# Patient Record
Sex: FEMALE | Race: WHITE
[De-identification: names, ages, dates, MRNs, and addresses within clinical notes are randomized per-mention and may not be internally consistent; named-entity substitution may affect disease eponyms.]

---

## 2023-03-13 ENCOUNTER — HOSPITAL ENCOUNTER (INPATIENT)
Dept: HOSPITAL 75 - IRF | Age: 58
LOS: 21 days | Discharge: HOME HEALTH | DRG: 56 | End: 2023-04-03
Attending: INTERNAL MEDICINE | Admitting: INTERNAL MEDICINE
Payer: COMMERCIAL

## 2023-03-13 VITALS — SYSTOLIC BLOOD PRESSURE: 108 MMHG | DIASTOLIC BLOOD PRESSURE: 53 MMHG

## 2023-03-13 VITALS — BODY MASS INDEX: 39.45 KG/M2 | WEIGHT: 251.33 LBS | HEIGHT: 67.01 IN

## 2023-03-13 VITALS — SYSTOLIC BLOOD PRESSURE: 125 MMHG | DIASTOLIC BLOOD PRESSURE: 68 MMHG

## 2023-03-13 DIAGNOSIS — M25.461: ICD-10-CM

## 2023-03-13 DIAGNOSIS — Z93.1: ICD-10-CM

## 2023-03-13 DIAGNOSIS — I69.391: ICD-10-CM

## 2023-03-13 DIAGNOSIS — E43: ICD-10-CM

## 2023-03-13 DIAGNOSIS — H53.2: ICD-10-CM

## 2023-03-13 DIAGNOSIS — Z99.3: ICD-10-CM

## 2023-03-13 DIAGNOSIS — R33.9: ICD-10-CM

## 2023-03-13 DIAGNOSIS — Z79.4: ICD-10-CM

## 2023-03-13 DIAGNOSIS — D64.9: ICD-10-CM

## 2023-03-13 DIAGNOSIS — K59.00: ICD-10-CM

## 2023-03-13 DIAGNOSIS — E78.5: ICD-10-CM

## 2023-03-13 DIAGNOSIS — F41.9: ICD-10-CM

## 2023-03-13 DIAGNOSIS — F32.A: ICD-10-CM

## 2023-03-13 DIAGNOSIS — R13.10: ICD-10-CM

## 2023-03-13 DIAGNOSIS — R53.81: ICD-10-CM

## 2023-03-13 DIAGNOSIS — I69.322: ICD-10-CM

## 2023-03-13 DIAGNOSIS — E11.40: ICD-10-CM

## 2023-03-13 DIAGNOSIS — I10: ICD-10-CM

## 2023-03-13 DIAGNOSIS — L89.154: ICD-10-CM

## 2023-03-13 DIAGNOSIS — I69.351: Primary | ICD-10-CM

## 2023-03-13 DIAGNOSIS — I69.320: ICD-10-CM

## 2023-03-13 DIAGNOSIS — K21.9: ICD-10-CM

## 2023-03-13 DIAGNOSIS — E03.9: ICD-10-CM

## 2023-03-13 DIAGNOSIS — I69.398: ICD-10-CM

## 2023-03-13 PROCEDURE — 87186 SC STD MICRODIL/AGAR DIL: CPT

## 2023-03-13 PROCEDURE — 83540 ASSAY OF IRON: CPT

## 2023-03-13 PROCEDURE — 74230 X-RAY XM SWLNG FUNCJ C+: CPT

## 2023-03-13 PROCEDURE — 81000 URINALYSIS NONAUTO W/SCOPE: CPT

## 2023-03-13 PROCEDURE — 85652 RBC SED RATE AUTOMATED: CPT

## 2023-03-13 PROCEDURE — 82947 ASSAY GLUCOSE BLOOD QUANT: CPT

## 2023-03-13 PROCEDURE — 80202 ASSAY OF VANCOMYCIN: CPT

## 2023-03-13 PROCEDURE — 36569 INSJ PICC 5 YR+ W/O IMAGING: CPT

## 2023-03-13 PROCEDURE — 80053 COMPREHEN METABOLIC PANEL: CPT

## 2023-03-13 PROCEDURE — 72197 MRI PELVIS W/O & W/DYE: CPT

## 2023-03-13 PROCEDURE — 87205 SMEAR GRAM STAIN: CPT

## 2023-03-13 PROCEDURE — 82607 VITAMIN B-12: CPT

## 2023-03-13 PROCEDURE — 85025 COMPLETE CBC W/AUTO DIFF WBC: CPT

## 2023-03-13 PROCEDURE — 36415 COLL VENOUS BLD VENIPUNCTURE: CPT

## 2023-03-13 PROCEDURE — 76937 US GUIDE VASCULAR ACCESS: CPT

## 2023-03-13 PROCEDURE — 86141 C-REACTIVE PROTEIN HS: CPT

## 2023-03-13 PROCEDURE — 87070 CULTURE OTHR SPECIMN AEROBIC: CPT

## 2023-03-13 PROCEDURE — 87077 CULTURE AEROBIC IDENTIFY: CPT

## 2023-03-13 RX ADMIN — INSULIN ASPART SCH UNIT: 100 INJECTION, SOLUTION INTRAVENOUS; SUBCUTANEOUS at 20:14

## 2023-03-13 RX ADMIN — ACETAMINOPHEN PRN MG: 325 TABLET ORAL at 18:12

## 2023-03-13 RX ADMIN — DOCUSATE SODIUM AND SENNOSIDES SCH EA: 8.6; 5 TABLET, FILM COATED ORAL at 22:02

## 2023-03-13 RX ADMIN — MELATONIN 3 MG ORAL TABLET SCH MG: 3 TABLET ORAL at 22:00

## 2023-03-13 RX ADMIN — DOCUSATE SODIUM SCH MG: 100 CAPSULE ORAL at 22:00

## 2023-03-13 RX ADMIN — Medication SCH EACH: at 22:01

## 2023-03-13 RX ADMIN — SUCRALFATE SCH GM: 1 TABLET ORAL at 16:08

## 2023-03-13 RX ADMIN — INSULIN ASPART SCH UNIT: 100 INJECTION, SOLUTION INTRAVENOUS; SUBCUTANEOUS at 16:58

## 2023-03-13 RX ADMIN — FAMOTIDINE SCH MG: 20 TABLET, FILM COATED ORAL at 22:02

## 2023-03-13 RX ADMIN — AMITRIPTYLINE HYDROCHLORIDE SCH MG: 25 TABLET, FILM COATED ORAL at 22:02

## 2023-03-13 RX ADMIN — PREGABALIN SCH MG: 75 CAPSULE ORAL at 22:01

## 2023-03-13 RX ADMIN — POLYETHYLENE GLYCOL (3350) SCH GM: 17 POWDER, FOR SOLUTION ORAL at 22:02

## 2023-03-13 RX ADMIN — HYDROCODONE BITARTRATE AND ACETAMINOPHEN PRN EA: 5; 325 TABLET ORAL at 22:02

## 2023-03-13 RX ADMIN — HYDROCODONE BITARTRATE AND ACETAMINOPHEN PRN EA: 5; 325 TABLET ORAL at 16:08

## 2023-03-13 RX ADMIN — SUCRALFATE SCH GM: 1 TABLET ORAL at 22:02

## 2023-03-13 RX ADMIN — ENOXAPARIN SODIUM SCH MG: 100 INJECTION SUBCUTANEOUS at 16:09

## 2023-03-13 NOTE — PHYSICAL THERAPY EVALUATION
PT Evaluation-General


Medical Diagnosis


Admission Date


Mar 13, 2023 at 12:25


Medical Diagnosis:  CVA, (R) hemiparesis


Onset Date:  Jan 28, 2023





Therapy Diagnosis


Therapy Diagnosis:  impaired functional mobility, (L) LE weakness, impaired 

activity tolerance





Precautions


Precautions/Isolations:  Fall Prevention, Standard Precautions, Pressure Ulcer





Weight Bear Status


Weight Bearing/Tolerated


Weight Bearing/Tolerated





Referral


Physician:  Joey


Reason for Referral:  Evaluation/Treatment





Medical History


Pertinent Medical History:  DM, Hypothroidism, Neuropathy


Additional Medical History


endocarditis, (R) knee effusion, decubitus ulcer sacrum with hx wound vac, 

chronic anemia, hx Covid-19, constipation, PEG tube.  Expressive aphasia and d

ysphagia since CVA.


Reviewed History:  Yes





Social History


Home:  Single Level


Current Living Status:  Spouse


Entry Into Home:  Ramp





Prior


Prior Level of Function


SCALE: Activities may be completed with or without assistive devices.





6-Indepedent-patient completes the activity by him/herself with no assistance 

from a helper.


5-Set-up or Clean-up Assistance-helper sets up or cleans up; patient completes 

activity. [place] assists only prior to or  


    following the activity.


4-Supervision or Touching Assistance-helper provides verbal cues and/or 

touching/steadying and/or contact guard assistance as patient completes activi

ty. Assistance may be provided   


    throughout the activity or intermittently.


3-Partial/Moderate Assistance-helper does LESS THAN HALF the effort. [place] 

lifts, holds or supports trunk or limbs, but provides less than half the effort.


2-Substantial/Maximal Assistance-helper does MORE THAN HALF the effort. [place] 

lifts or holds trunk or limbs and provides more than half the effort.


9-Fcoyekbsd-larmia does ALL the effort. Patient does none of the effort to 

complete the activity. Or, the assistance of 2 or more helpers is required for 

the patient to complete the  


    activity.


If activity was not attempted, code reason:


7-Patient Refused.


9-Not Applicable-not attempted and the patient did not perform the activity 

before the current illness, exacerbation or injury.


10-Not Attempted due to Environmental Limitations-(lack of equipment, weather 

restraints, etc.).


88-Not Attempted due to Medical Conditions or Safety Concerns.


Bed Mobility:  6


Transfers (B,C,W/C):  6 (used FWW in home )


Gait:  6 (used FWW in home, w/c for community mobility for past 3 years.)


Stairs:  9 (has a ramp)


Wheelchair Mobility:  6


Indoor Mobility (Ambulation):  Independent


Stairs:  Not [place]


Prior Devices Use:  Manual wheelchair, Walker


Has been w/c level for distances over 150' for 3 years due to neuropathy in 

feet.  Started to decline in October 2022 - when decubitus ulcer began.





PT Evaluation-Current


Subjective


Patient able to follow verbal commands.  Is able to express needs when given 

time to complete, does have some word finding difficulties. Can answer yes/no 

questions accurately. Has no c/o pain sitting at rest in w/c nor during 

transfers with sit<>stand lift.





Pain


Section J - Health Conditions


1. Rarely or not at all


2. Occasionally


3. Frequently


4. Almost constantly


8. Unable to answer


Pain Effect on Sleep:  1


Pain Interference with Therapy:  1


Pain Interference w/Day-to-Day:  1





Pt/Family Goals


patient hopes for patient to be independent at w/c level.





Objective


Patient Orientation:  Person, Place, Situation


Attachments:  [place] Catheter





ROM/Strength


ROM Upper Extremities


Deferred to OT


ROM Lower Extremities


(L) LE WFL ROM in sitting at knee/hip, does have some mild DF loss actively on 

(L)/unaffected side.


Strength Upper Extremities


deferred to OT


Strength Lower Extremities


(R) ankle 3-/5 DF/PF, (R) knee 3-/5 extension/flexion, (R) hip flexion 2/5.  (L)

ankle DF 3-/5, PF 4/5, (L) knee flexion/ext 4/5, (L) hip 3+/5.





Integumentary/Posture


Integumentary


Wound on sacrum - wound nurse to assess.





Neuromuscular


(Tone, Coordination, Reflexes)


Decreased coordination/motor control (L) LE/UE.





Sensory


Vision:  blurry


Hand Dominance:  Right


Sensation Right Lower Extremit:  Impaired


Sensation Left Lower Extremity:  Impaired


Sensation Lower Extremities


neuropathy (B) feet (PLOF), with more decreased sensation (R) LE since CVA, but 

intact to light touch





Transfers


Roll Left & Right (QC):  3


Sit to Lying (QC):  3 (Mod (A) with LE's and assist to bring shoulders to center

of bed afterward.)


Lying to Sitting/Side of Bed(Q:  3 (Mod (A) with LE's and bring trunk to full 

upright position)


Sit to Stand (QC):  1 (standing lift)


Chair/Bed-to-Chair Xfer(QC):  1 (Standing lift)


Toilet Transfer (QC):  1 (standing lift)


Car Transfer (QC):  88


Able to initiate sit>stand to lift buttocks off w/c ~ 2" with max (A) of 1, but 

unable to come to even a partial standing position with max (A) of staff.





Gait


Does the Patient Walk?:  No and Walking Goal NOT indicated


Mode of Locomotion:  Wheelchair


Anticipated Mode of Locomotion:  Wheelchair


Walk 10 feet (QC):  88


Walk 50 ft with 2 Turns(QC):  88


Walk 150 ft (QC):  88


Walking 10ft/uneven surface-QC:  88





Wheelchair Training


Does the Pt Use a Wheelchair?:  Yes


Wheel 50 ft with 2 turns (QC):  2 (Patient helps propel with (L) UE, but 

requires extensive assist for negotation)


Wheel 150 ft (QC):  88 (not attempted due to fatigue /p standing and 50' 

propulsion to group)


Type of Wheelchair:  Manual


would benefit from one-arm drive w/c at this time if (R) UE does not improve





Stairs


1 Step (curb) (QC):  88


4 Steps (QC):  88


12 Steps (QC):  88





Balance


Sitting Static:  Good


Sitting Dynamic:  Fair


Standing Static:  Poor


 Standing Dynamic:  Poor


Picking up an Object (QC):  88





Assessment/Needs


Patient is a 57 year old female who has been hospitalized since 1/28/23 for CVA.

 Is w/c level at this time with (R) LE/UE weakness, inability to stand/transfer 

without lift equipment, and significant deficits with W/C mobility.  Anticipate 

patient will be w/c level at d/c.  Would benefit from w/c mobility training, 

transfer training, bed mobility training, LE strengthening, patient/family 

education prior to return home with family support.


Rehab Potential:  Fair


Equipment Needs


Possibly a one-arm drive w/c.





PT Long Term Goals


Long Term Goals


PT Long Term Goals Time Frame:  May 8, 2023


Roll Left to Right (QC):  4


Sit to Lying (QC):  4


Lying-Sitting on Side/Bed(QC):  4


Sit to Stand (QC):  2


Chair/Bed-to-Chair Xfer(QC):  2


Toilet/Commode Transfer (QC):  2


Car Transfer (QC):  2


Does the Patient Walk:  No and Walking Goal NOT indicated


Walk 10 feet (QC):  1


Walk 10ft-Uneven Surface(QC):  88


Walk 50ft with 2 Turns (QC):  88


Walk 150 ft (QC):  88


Does the Pt use WC or Scooter?:  Yes


Wheel 50 feet with 2 turns (QC:  5 (Patient able to use (L) LE and (B) LE's for 

w/c propulsion)


Type:  Manual


Wheel 150 feet:  5


Type:  Manual


1 Step (curb) (QC):  88


4 Steps (QC):  88


12 Steps (QC):  88


Picking up an Object (QC):  88





PT Plan


Problem List


Problem List:  Activity Tolerance, Functional Strength, Balance, Transfer, Bed 

Mobility, Other (W/C mobiity)





Treatment/Plan


Treatment Plan:  Continue Plan of Care


Treatment Plan:  Bed Mobility, Concurrent Therapy, Education, Functional 

Activity Russ, Functional Strength, Group Therapy, Safety, Therapeutic 

Exercise, Transfers, Other (W/C mobility)


Treatment Duration:  May 8, 2023


Frequency:  At least 5 of 7 days/Wk (IRF)


Estimated Hrs Per Day:  1.5 hours per day


Patient and/or Family Agrees t:  Yes





Discharge Recommendations


Therapy Discharge Recommendati:  Home & Family, Post Acute PT


Equpiment Recommendations-D/C:  Other, Please Explain (possibly a one-arm drive 

W/C)





Time


Time In:  1220


Time Out:  1300


DATE:  Mar 13, 2023


Total Billed Treatment Time:  30


Total Billed Treatment


10' eval, 20 minute co-treat











Laurel Heredia PT               Mar 13, 2023 14:30

## 2023-03-13 NOTE — PM&R POST ADMISSION ASSESSMENT
PM&R HP


Date of Visit:  Mar 13, 2023


Time of Visit:  13:00


History of Present Illness


CC: CVA





HPI: This is a 57yoWF who presents from [place] in need of recovery following 

catastrophic CVA. She originally presented to [facility] on 1/28/23 with right sided 

weakness and expressive aphasia and w/u ensued revealing left temporal stroke 

and embolic stroke findings with endocarditis confirmed on LIZ so she has 

finished 6 weeks of Rocephin. TSH was treated with IV Synthroid due to severity 

of TSH but that will be converted to PO. PEG is being used for nutrition but she

can tolerate PO if thickened.








Subjective: 


H+P: 57 year old female presents to [facility] from [facility] in [place], [place]. Patient presented to [facility] on 1/28 with AMS, Aphasia, and right-

sided weakness. Patient was found to have had a L Temporal stroke, with MRI 

findings of multiple infarcts bilaterally suggestive of embolic stroke. Patient 

did not receive tPA. Patient's blood cultures grew out Strep Pyogenes, and LIZ 

showed atrial valve vegetations, giving the patient a diagnosis of endocarditis.

Infectious Disease placed the patient on 6 weeks of ceftriaxone. Infectious 

source was believed to be sacral decubitis ulcer. Patient failed her swallow 

study testing and a PEG tube was placed on 2/2. Patient was also noted to have a

TSH of 58 and a T4 of 0.44 and was started on IV Levothyroxine. Patient was 

transferred to [facility] on 2/3, where she began the recovery process. 

Patient was noted to have a right knee redness and swelling on 2/27. Knee XR 

showed a large joint effusion. [facility] was consulted on 3/1 and stated 

that arthrocentesis was not necessary at this time and to watch and wait. 

Patient's clinical status continued to improve and the decision was made to 

transfer to our IRF. Patient is still suffering from expressive aphasia and 

dysarthria. 





Today, Patient's only complaint is her double vision that is very jarring. 

States it started after the stroke and hasn't gotten better or worse. Does get 

worse with fatigue. Hasn't noticed anything that improves it other than rest. 

She asked about prognosis of this/recovery time. States she is otherwise very 

happy with how she has progressed and believes she is making progress every day.







ROS: (+) Double Vision, Headache (-) Chest pain, palpitations, dyspnea, wheez

ing, abdominal pain, constipation, diarrhea, dysuria (catheter present), 

hematuria, nausea, vomiting, confusion, anxiety, depression





Past Medical History: Embolic Stroke, Endocarditis, Dysphagia, HLD, GERD, DMII-

ID, Neuropathy, Hypothyroidsim, Anemia, Sacral Decubitis Ulcer  





Past Surgical History: Ulcer surgery, cholecystectomy, wound debridement, PEG 

tube





Meds: (From [place], dosing per chart) Amitriptyline, Atorvastatin, Famotidine,

Iron elixir, Folic acid, Lantus, Insulin Lispro, Lansoprazole, Levothyroxine, 

Lidocaine Patch, Melatonin, Pregablin, Sucralfate, Alprazolam PRN, 

Cyclobrenzaprine PRN, Lactulose PRN, Tylenol scheduled, Hydrocodone 5/325 Q6H 

PRN moderate pain, Hydromorphone 1mg Q6H PRN Severe Pain. 





Allergies: NKDA





Family History: No Significant Family Hx reported per patient and patient's 

mother who was present





DTA: Denies drugs, tobacco, alcohol





Social Hx: Lives at home with 





Occupation: N/A





Objective:


Vitals: T 36.7, Pulse - 106, RR - 20, /53, O2 - 98% RA





Labs: CBC - WBC - 4.8, Hgb 8.5, Hct 29.3, Pl8 - 338


CMP: Na- 136, K - 4.0, Cl - 102, CO2 - 31, BUN - 19, Cr - 0.4, Glucose - 170 





Physical Exam


Gen: No acute distress, wheel-chair bound patient


HEENT: EOMI, slight L sided facial droop noticed


CV: RRR, end-diastolic vs regurgitant systolic murmur? 


Pulm: CTAB, no wheezing, rales, rhonchi


Abdomen: Soft, Non-tender


Extremity: Trace pedal edema bilaterally, no calf tenderness, Slight swelling in

right knee, no erythema noted at this time


Psych: A+Ox4, normal affect, normal mood





Assessment: 


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 


Infective Endocarditis of Atrial Valve


Dysarthria 


Right Knee Effusion 


Sacral Decubitis Ulcer


Dysphagia


Anemia


T2DM - ID


Hypothyroidism


Constipation


Hyperlipidemia


GERD








Plan: 


Resume medications


PT/OT


Wound Care


Bowel Regimen 


QOD Labs to trend 


Monitor R Knee





DVT: Heparin





Past Medical-Social-Family Hx


Past Med/Social Hx:  Reviewed Nursing Past Med/Soc Hx, Reviewed and Corrections 

made


Patient Social History


Marrital Status:  


Employed/Student:  employed


Alcohol Use:  Denies Use


Smoking Status:  Unknown if Ever Smoked





Past Medical History


Cardiac:  Endocarditis


Neurological:  Stroke


Endocrine:  Hypothyroidsim





PM&R Allergy/Meds/Data Review


Allergies


Coded Allergies:  


     No Allergy Information Available (Unverified , 3/13/23)





Home Medications


Scheduled


Amitriptyline HCl (Amitriptyline HCl), 25 MG PEG HS, (Reported)


Atorvastatin Calcium (Atorvastatin Calcium), 40 MG PEG HS, (Reported)


Famotidine (Famotidine), 20 MG PEG BID, (Reported)


Ferrous Sulfate (Ferrous Sulfate), 220 MG PEG BID, (Reported)


Folic Acid (Folic Acid), 1 MG PEG DAILY, (Reported)


Insulin Glargine,Hum.rec.anlog (Lantus), 12 UNIT SQ BID, (Reported)


Insulin Lispro (Insulin Lispro), UNIT SQ Q6H, (Reported)


Lactobacillus Acidophilus/Pect (Acidophilus-Pectin Capsule), 1 EACH PEG BID, 

(Reported)


Lansoprazole (Lansoprazole), 30 MG PEG DAILY, (Reported)


Levothyroxine Sodium (Levothyroxine Sodium), 100 MCG IV DAILY, (Reported)


Lidocaine (Lidocaine 5% Patch), 1 EACH TP DAILY, (Reported)


Melatonin (Melatonin), 6 MG PEG HS, (Reported)


Pregabalin (Pregabalin), 75 MG PEG BID, (Reported)


Sucralfate (Carafate), 1 GM PEG ACHS, (Reported)


Whey Protein Isolate (Beneprotein), 1 EACH PEG Q4H, (Reported)





Scheduled PRN


Acetaminophen (Tylenol), 650 MG PEG Q4H PRN for PAIN-MILD (1-4) OR TEMPATURE, 

(Reported)


Cyclobenzaprine HCl (Cyclobenzaprine HCl), 10 MG PEG TID PRN for MUSCLE SPASMS, 

(Reported)


Hydrocodone/Acetaminophen (Hydrocodone-Acetamin 5-325 mg), 1 TAB PEG Q6H PRN for

PAIN-MODERATE (5-7), (Reported)


Lactulose (Lactulose), 20 GM PEG DAILY PRN for CONSTIPATION-3RD LINE, (Reported)


Mag Hydrox/Al Hydrox/Simeth (Mylanta  Suspension), 30 ML PEG Q6H PRN for INDIGE

STION, (Reported)





Current Medications


Current Medications


Reviewed





Review of Systems


Constitutional:  see HPI, malaise, weakness


EENTM:  no symptoms reported


Respiratory:  no symptoms reported


Cardiovascular:  no symptoms reported


Genitourinary:  no symptoms reported


Musculoskeletal:  back pain, joint pain


Skin:  no symptoms reported


Psychiatric/Neurological:  See HPI, Anxiety, Depressed, Weakness





Physical Exam


Physical Exam


Vital Signs





Vital Signs - First Documented








 3/13/23





 12:53


 


Temp 36.7


 


Pulse 106


 


Resp 20


 


B/P (MAP) 108/53 (71)


 


Pulse Ox 98


 


O2 Delivery Room Air





Capillary Refill :


Height, Weight, BMI


Height: '"


Weight: lbs. oz. kg;  BMI


Method:


General Appearance:  WD/WN, Anxious, Chronically ill, Mild Distress


Eyes:  Bilateral Eye Normal Inspection, Bilateral Eye PERRL


HEENT:  PERRL/EOMI, Normal ENT Inspection, Pharynx Normal


Neck:  Full Range of Motion, Normal Inspection, Non Tender, Supple, Carotid 

Bruit


Respiratory:  Chest Non Tender, Lungs Clear, Normal Breath Sounds, No Accessory 

Muscle Use, No Respiratory Distress


Cardiovascular:  Regular Rate, Rhythm, No Edema, No Gallop, No JVD, No Murmur, 

Normal Peripheral Pulses


Gastrointestinal:  Normal Bowel Sounds, No Organomegaly, No Pulsatile Mass, Non 

Tender, Soft


Back:  Normal Inspection, No CVA Tenderness, No Vertebral Tenderness


Extremity:  Normal Capillary Refill, Normal Inspection, Normal Range of Motion 

(except right side), Non Tender, No Calf Tenderness, No Pedal Edema


Neurologic/Psychiatric:  Alert, Oriented x3, CNs II-XII Norm as Tested, Abnormal

CNs II-XII, Depressed Affect, Facial Droop (right ), Motor Weakness (right sided

weakness)


Skin:  Normal Color, Warm/Dry


Lymphatic:  No Adenopathy





PM&R Medical Assessment & Plan


REHAB/MEDICAL ASSESSMENT AND PLAN:





REHAB IMPAIRMENT GROUP: 


CVA





ETIOLOGIC DIAGNOSIS: 


CVA





The comorbidities that impact the patients function and/or functional outcome 

by: catastrophic CVA, right sided weakness, expressive aphasia, severe 

hypothyroidism





REHAB PLAN:


The patient is being admitted to our comprehensive inpatient rehabilitation 

facility and can tolerate the intensity of service consisting of at least:


      180 minutes of therapy a day, 5 out of 7 days a week 


    


Rehab treatment will consist of:   PT OT ST will all focus on regaining function

with use of AD in order to ambulate and swallow safely along with prevention of 

fall risk and decrease care burden for family





The patient/family has a good understanding of our discharge process and will 

benefit from an interdisciplinary inpatient rehabilitation program. The patient 

has potential to make improvement and is in need of at least two of the 

following multidisciplinary therapies including but not limited to physical, 

occupational, speech, and prosthetics and orthotics.  Additionally the patient 

will need services from respiratory, nutritional services, wound care, 

psychology, etc. (Customize this to each patient). Given the patients complex 

condition and risk of further medical complications, rehabilitation services 

cannot be safely or effectively provided at a lower level of care such as a 

skilled nursing facility.





BARRIERS TO DISCHARGE:


Severe right sided weakness with expressive aphasia





ESTIMATED LOS:


14 days





DISPOSITION:


Home with family





RELEVANT CHANGES SINCE PREADMISSION SCREENING: 


I have compared the patients medical and functional status at the time of the 

preadmission screening and there are: 


      no changes





PROGNOSIS:


Guarded





REHABILITATION GOALS:  


1. PT OT ST will all focus on regaining function with use of AD in order to 

ambulate and swallow safely along with prevention of fall risk and decrease care

burden for family








All the above goals were reviewed with the patient and he/she is in agreement.


By signing this document, I acknowledge that I have personally performed a full 

physical examination on this patient within 24 hours of admission to this 

inpatient rehabilitation facility and have determined the patient to be able to 

tolerate the above course of treatment at an intensive level for a reasonable 

period of time. I will be completing a detailed individualized Plan of Care for 

this patient by day #4 of the patients stay based upon the Preadmission Screen,

the Post-Admission Evaluation, and the therapy evaluations.


Admission Dx/Comorbidities:  


(1) CVA (cerebral vascular accident)


ICD Codes:  I63.9 - Cerebral infarction, unspecified





Assessment/Plan


Assessment and Plan


Assess & Plan/Chief Complaint


Assessment:


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


Infective Endocarditis of Atrial Valve


Dysarthria/expressive aphasia


Dysphagia with PEG tube


Right Knee Effusion 


Sacral Decubitis Ulcer


Anemia


T2DM - ID


Hypothyroidism


Constipation


Hyperlipidemia


GERD





Plan:


Monitor closely


Change Synthroid to PO


ST consult for dysphagia


Utilize PEG


PT OT











COOKIE CHRISTIE DO                Mar 13, 2023 13:03

## 2023-03-13 NOTE — OCCUPATIONAL THERAPY EVAL
OT Evaluation-General/PLF


Medical Diagnosis


Admission Date


Mar 13, 2023 at 12:25


Medical Diagnosis:  CVA


Onset Date:  2023





Therapy Diagnosis


Therapy Diagnosis:  decreased ADL status, impaired functional use RUE





Referral


Physician:  Joey Moffett Reason:  Evaluation/Treatment





Medical History


Additional Medical History


DM, DDD, HLD, hypothyroidism, neuropathy


Current History


23 [facility] due to AMS with aphasia and R side weakness. Foud to have L

temporal stroke, and dx with COVID-19 upon admission. 2/2 PEG placed due to 

failed swallow eval. 2/3 transferred to [facility]. 3/13/23 transfer to 

[facility]. Pt has sacral wound that has required a wound vac (pt has had this 

wound since Oct 2022.)





Social History


Home:  Single Level


Current Living Status:  Spouse


Entry Into Home:  Ramp





ADL-Prior Level of Function


SCALE: Activities may be completed with or without assistive devices.





6-Indepedent-patient completes the activity by him/herself with no assistance 

from a helper.


5-Set-up or Clean-up Assistance-helper sets up or cleans up; patient completes 

activity. [place] assists only prior to or  


    following the activity.


4-Supervision or Touching Assistance-helper provides verbal cues and/or 

touching/steadying and/or contact guard assistance as patient completes 

activity. Assistance may be provided   


    throughout the activity or intermittently.


3-Partial/Moderate Assistance-helper does LESS THAN HALF the effort. [place] 

lifts, holds or supports trunk or limbs, but provides less than half the effort.


2-Substantial/Maximal Assistance-helper does MORE THAN HALF the effort. [place] 

lifts or holds trunk or limbs and provides more than half the effort.


0-Vxdiftbmk-yvcmza does ALL the effort. Patient does none of the effort to 

complete the activity. Or, the assistance of 2 or more helpers is required for 

the patient to complete the  


    activity.


If activity was not attempted, code reason:


7-Patient Refused.


9-Not Applicable-not attempted and the patient did not perform the activity 

before the current illness, exacerbation or injury.


10-Not Attempted due to Environmental Limitations-(lack of equipment, weather 

restraints, etc.).


88-Not Attempted due to Medical Conditions or Safety Concerns.


ADL PLOF Comments


Pt, , and pt's mother provide information about PLOF. Pt unsure about 

some questions asked, or when she answers questions, /mother indicate 

that what pt has said wasn't completely accurate.


Pt IND with ADLs at w/c level. Pt able to walk some in house using a walker, but

has some difficulty due to neuropathy in LEs. Pt has been w/c level for most 

mobility for the last 3 years. Pt has a walk in shower with shower bench, as 

well as a tub shower.


Self Care:  Independent


Functional Cognition:  Unknown


DME/Equipment Comments


walker, w/c, shower bench.





OT Current Status


Subjective


Pt agreeable to OT tx. Pt's  present at start of tx, and her mother 

arrives following tx.


Pt appears to have some R side neglect, did not attend to people as they entered

pt's R visual field, and when informed there was someone there, pt turned head 

towards L.





Mental Status/Objective


Patient Orientation:  Person, Confused, Place, Situation


Attachments:  [place] Catheter, PEG Tube





Current


Hand Dominance:  Right


Upper Extremity ROM


RUE shoulder flexion to approx 75 degrees against gravity. Elbow 

flexion/extension impaired but slight movement noted. Pt able to flex/extend 

fingers but unable to make full fist.


LUE WFL.


Upper Extremity Coordination


RUE decreased.


Upper Extremity Sensation


Decreased sensation reported in RUE


Upper Extremity Strength


RUE grossly 2-/5, LUE grossly 3+/5





ADL-Treatment


Eating (QC):  88 (PEG tube.)


Oral Hygiene (QC):  2


Shower/Bathe Self (QC):  1 (Lift required to was buttocks.)


Upper Body Dressing (QC):  2


Lower Body Dressing (QC):  1 (lift required for pant hike.)


On/Off Footwear (QC):  2


Toileting Hygiene (QC):  1 (lift required for hygiene and pant hike.)





Other Treatments


OT evaluation complete ([id number]), OT/PT cotreat ([id number]) due to skill of 2 

clinicians required which a rehab tech could not perform in order to coordinate 

UE/LEs, decrease fall risk, and due to pt's limitations in R side weakness, 

mobility/transfers, dynamic sitting balance, and activity tolerance. OT focused 

on ADLs, UE placement, cues for sequencing and safety, PT focused on LE 

placement, gross overall movement, functional mobility/transfers. Pt transferred

from w/c to EOB via sit to stand lift, pt completed bed mobility, then returned 

to EOB. Pt able to maintain EOB static sitting balance with SBA-CGA. Pt 

attempted to complete footwear (total assist R gripper sock, max A L). Pt stood 

via sit to stand lift, noted incontinent of BM. Pt required total assist to 

clean, RN aware and present to change sacral dressing. Pt then transferred to 

w/ and taken to ARU common area for group tx. Post tx, pt in ARU common area in

w/c, all needs met, therapy staff present for group therapy.





Education


OT Patient Education:  Correct positioning, Energy conservation, Modified ADL 

techniques, Progress toward Goal/Update tx plan, Purpose of tx/functional 

activities, Rehab process


Teaching Recipient:  Patient


Teaching Methods:  Discussion


Response to Teaching:  Verbalize Understanding





BIMS CAM


BIMS


Expression of Ideas and Wants:  Difficulty (aphasia present)


Understanding Verbal Content:  Usually Understands


Brief Interview/Mental Status:  Yes


IRF BARBARA BIMS:  








IRF BARBARA BIMS Response (Comments) Value


 


Repitition of Three Words Two 2


 


Recalls Socks Yes, After Cueing (Wear) 1


 


Recalls Blue Yes, After Cueing (Color) 1


 


Recalls Bed No, Could Not Recall 0


 


Year Correct 3


 


Month Missed by 1 Mo/No Answer (January) 0


 


Day Correct 1


 


Total  8








Should Staff Asses. Mental St.:  No





CAM


Mental Status Change/Baseline:  1


Inattention:  0


Disorganized thinkin


Altered level of consciousness:  0





OT Short Term Goals


Short Term Goals


Time Frame:  Mar 28, 2023


Eating:  3


Toileting hygiene:  3


Shower/bathe self:  3


Lower body dressing:  3


Putting on/taking off footwear:  3





OT Long Term Goals


Long Term Goals


Time Frame:  2023


Eating (QC):  5


Oral Hygiene (QC):  5


Toileting Hygiene (QC):  4


Shower/Bathe Self (QC):  4


Upper Body Dressing (QC):  5


Lower Body Dressing (QC):  4


On/Off Footwear (QC):  4


Additional Goals:  1-Demonstrate ADL Tasks, 2-Verbalize Understanding, 3-

ImproveStrength/Russ


1=Demonstrate adherence to instructed precautions during ADL tasks.


2=Patient will verbalize/demonstrate understanding of assistive devices/m

odifications for ADL.


3=Patient will improve strength/tolerance for activity to enable patient to 

perform ADL's.





OT Education/Plan


Problem List/Assessment


Assessment:  Decreased Activ Tolerance, Decreased Safety Aware, Decreased UE 

Strength, Dependent Transfers, Impaired Bed Mobility, Impaired Cognition, 

Impaired Coordination, Impaired Funct Balance, Impaired I ADL's, Impaired Self-

Care Skills, Restricted Funct UE ROM, Visual-Perceptual Deficit





Discharge Recommendations


Plan/Recommendations:  Continue POC





Treatment Plan/Plan of Care


Patient would benefit from OT for education, treatment and training to promote 

independence in ADL's, mobility, safety and/or upper extremity function for 

ADL's.


Plan of Care:  ADL Retraining, Functional Mobility, Group Exercise/Act as Ind, 

UE Funct Exercise/Act, UE Neuromus Re-Ed/Coord, Visual/Perceptual Retrain, W/C 

Management Training


Treatment Duration:  2023


Frequency:  At least 5 of 7 days/Wk (IRF)


Estimated Hrs Per Day:  1.5 hours per day


Agreement:  Yes


Rehab Potential:  Fair





Time


Start Time:  12:30


Stop Time:  13:00


DATE:  Mar 13, 2023


Total Time Billed (hr/min):  30


Billed Treatment Time


OT eval [id number], [place] [id number]





1, EVH (10'), ADL (20')











JEREMY SHAHID OT          Mar 13, 2023 13:45

## 2023-03-13 NOTE — PHYSICAL THERAPY DAILY NOTE
PT Daily Note-Current


Subjective


Patient does report some discomfort at sacral wound after it was cleansed, 

packed and dressed by nursing.





Pain





   Numeric Pain Scale:  5-Moderate Pain


   Location Body Site:  Sacrum


   Pain Description:  Stabbing, Burning


Section J - Health Conditions


1. Rarely or not at all


2. Occasionally


3. Frequently


4. Almost constantly


8. Unable to answer


Pain Effect on Sleep:  1


Pain Interference with Therapy:  1


Pain Interference w/Day-to-Day:  1





Appearance


Patient in partial (L) sidelying when P.T. entered room.





Transfers


SCALE: Activities may be completed with or without assistive devices.





6-Indepedent-patient completes the activity by him/herself with no assistance 

from a helper.


5-Set-up or Clean-up Assistance-helper sets up or cleans up; patient completes 

activity. [place] assists only prior to or  


    following the activity.


4-Supervision or Touching Assistance-helper provides verbal cues and/or 

touching/steadying and/or contact guard assistance as patient completes 

activity. Assistance may be provided   


    throughout the activity or intermittently.


3-Partial/Moderate Assistance-helper does LESS THAN HALF the effort. [place] 

lifts, holds or supports trunk or limbs, but provides less than half the effort.


2-Substantial/Maximal Assistance-helper does MORE THAN HALF the effort. [place] 

lifts or holds trunk or limbs and provides more than half the effort.


4-[id number]-oszxfn does ALL the effort. Patient does none of the effort to 

complete the activity. Or, the assistance of 2 or more helpers is required for 

the patient to complete the  


    activity.


If activity was not attempted, code reason:


7-Patient Refused.


9-Not Applicable-not attempted and the patient did not perform the activity 

before the current illness, exacerbation or injury.


10-Not Attempted due to Environmental Limitations-(lack of equipment, weather 

restraints, etc.).


88-Not Attempted due to Medical Conditions or Safety Concerns.


Roll Left & Right (QC):  3 (mod (A) to roll fully (L) x 2, once to assist 

nursing with wound dressing, with assist of bed rail)





Weight Bearing


Weight Bearing/Tolerated


Weight Bearing/Tolerated





Exercises


10 reps each of (R) ankle resisted PF, (R) LE PNF pattern, quad sets, SAQ with 

AAROM, hip abd/add with AAROM.


5 reps of (R) heel cord stretch x :15, 5 reps of (R) SLR with AAROM and :10 

hamstring stretch at end range.





Rolling to partial (L) SL with pillow behind back and under (R) LE to position 

for comfort following ex.





Assessment


AAROM with (R) LE exercise in supine, tolerated well.





PT Long Term Goals


Long Term Goals


PT Long Term Goals Time Frame:  May 8, 2023


Roll Left & Right (QC):  4


Sit to Lying (QC):  4


Lying-Sitting on Side/Bed(QC):  4


Sit to Stand (QC):  2


Chair/Bed-to-Chair Xfer(QC):  2


Toilet Transfer (QC):  2


Car Transfer (QC):  2


Does the Patient Walk:  No and Walking Goal NOT indicated


Walk 10 feet (QC):  1


Walk 50ft with 2 Turns (QC):  88


Walk 150 ft (QC):  88


Walking 10ft on Uneven Surface:  88


1 Step (curb) (QC):  88


4 Steps (QC):  88


12 Steps (QC):  88


Picking up an Object (QC):  88


Does the Pt use WC or Scooter?:  Yes


Wheel 50 feet with 2 turns (QC:  5 (Patient able to use (L) LE and (B) LE's for 

w/c propulsion)


Type:  Manual


Wheel 150 feet:  5


Type:  Manual





PT Plan


Problem List


Problem List:  Activity Tolerance, Functional Strength, Safety, Balance, 

Transfer, Bed Mobility





Treatment/Plan


Treatment Plan:  Continue Plan of Care


Treatment Plan:  Bed Mobility, Concurrent Therapy, Education, Functional 

Activity Russ, Functional Strength, Group Therapy, Safety, Therapeutic Exe

rcise, Transfers, Other (W/C mobility)


Treatment Duration:  May 8, 2023


Frequency:  At least 5 of 7 days/Wk (IRF)


Estimated Hrs Per Day:  1.5 hours per day


Patient and/or Family Agrees t:  Yes





Safety Risks/Education


Patient Education:  Correct Positioning


Teaching Recipient:  Patient, Significant Other


Teaching Methods:  Demonstration, Discussion


Response to Teaching:  Verbalize Understanding





Time


Time In:  1535


Time Out:  1605


DATE:  Mar 13, 2023


Total Billed Treatment Time:  30


Total Billed Treatment


15' ther ex, 15' functional activity











Laurel Heredia PT               Mar 13, 2023 16:06

## 2023-03-13 NOTE — THERAPY GROUP DAILY NOTE
Therapy Daily Group Note


Patient Education Topic


Other List Below (memory/ARU description and expectations)





Exercises


LE Seated Exercise, UE Exercise





Session


Ratio (pt:therapist):  3:1


Goal of Session:  Education on ARU Expectations, Memory Strategies, UE/LE 

Strengthing


Goal Met for this Session:  Yes


Pt Benefit of Group:  Contributions to Others, F/U Use of Strategies @Home, 

Increased Functional Safety, Increased Functional Strength, Improved Cognition, 

Recognition of Peers, Socialization





Other/Notes


Pt transported via w/c to OT/PT group.  Group consisted of introductions (name, 

place living, memory task), socialization, B UE/LE seated exercises and 

education on memory.  Pt introduced self appropriately and actively listened to 

peers.  Pt has soft voice and is difficult to understand at this time.  Pt 

requires modifications for B UE/LE seated exercises due to R flaccid UE/LE.  Pt 

actively participated in memory education and activity appropriately by giving 

own strategies.  Pt able to participate in activity and was able to choose 2 out

of 4 correctly.  Pt requested to stay in w/c until air bed was found due to skin

issues.  Call light/phone in reach.  All needs met in room.


Start Time:  13:00


Stop Time:  14:30


Total Billed Treatment Time:  90


Total Billed Treatment


1-GRP











ARTIE MC               Mar 13, 2023 15:07

## 2023-03-13 NOTE — PROGRESS NOTE
RORY RAMOS 3/13/23 1403:


Progress Note


Subjective: 


H+P: 57 year old female presents to [facility]F from [facility] in [place], [place]. Patient presented to [facility] on 1/28 with AMS, Aphasia, and right-

sided weakness. Patient was found to have had a L Temporal stroke, with MRI 

findings of multiple infarcts bilaterally suggestive of embolic stroke. Patient 

did not receive tPA. Patient's blood cultures grew out Strep Pyogenes, and LIZ 

showed atrial valve vegetations, giving the patient a diagnosis of endocarditis.

Infectious Disease placed the patient on 6 weeks of ceftriaxone. Infectious 

source was believed to be sacral decubitis ulcer. Patient failed her swallow 

study testing and a PEG tube was placed on 2/2. Patient was also noted to have a

TSH of 58 and a T4 of 0.44 and was started on IV Levothyroxine. Patient was 

transferred to [facility] on 2/3, where she began the recovery process. 

Patient was noted to have a right knee redness and swelling on 2/27. Knee XR 

showed a large joint effusion. [facility] was consulted on 3/1 and stated 

that arthrocentesis was not necessary at this time and to watch and wait. 

Patient's clinical status continued to improve and the decision was made to 

transfer to our IRF. Patient is still suffering from expressive aphasia and 

dysarthria. 





Today, Patient's only complaint is her double vision that is very jarring. 

States it started after the stroke and hasn't gotten better or worse. Does get 

worse with fatigue. Hasn't noticed anything that improves it other than rest. 

She asked about prognosis of this/recovery time. States she is otherwise very 

happy with how she has progressed and believes she is making progress every day.







ROS: (+) Double Vision, Headache (-) Chest pain, palpitations, dyspnea, 

wheezing, abdominal pain, constipation, diarrhea, dysuria (catheter present), 

hematuria, nausea, vomiting, confusion, anxiety, depression





Past Medical History: Embolic Stroke, Endocarditis, Dysphagia, HLD, GERD, DMII-

ID, Neuropathy, Hypothyroidsim, Anemia, Sacral Decubitis Ulcer  





Past Surgical History: Ulcer surgery, cholecystectomy, wound debridement, PEG 

tube





Meds: (From [place], dosing per chart) Amitriptyline, Atorvastatin, Famotidine,

Iron elixir, Folic acid, Lantus, Insulin Lispro, Lansoprazole, Levothyroxine, 

Lidocaine Patch, Melatonin, Pregablin, Sucralfate, Alprazolam PRN, 

Cyclobrenzaprine PRN, Lactulose PRN, Tylenol scheduled, Hydrocodone 5/325 Q6H 

PRN moderate pain, Hydromorphone 1mg Q6H PRN Severe Pain. 





Allergies: NKDA





Family History: No Significant Family Hx reported per patient and patient's 

mother who was present





DTA: Denies drugs, tobacco, alcohol





Social Hx: Lives at home with 





Occupation: N/A





Objective:


Vitals: T 36.7, Pulse - 106, RR - 20, /53, O2 - 98% RA





Labs: CBC - WBC - 4.8, Hgb 8.5, Hct 29.3, Pl8 - 338


CMP: Na- 136, K - 4.0, Cl - 102, CO2 - 31, BUN - 19, Cr - 0.4, Glucose - 170 





Physical Exam


Gen: No acute distress, wheel-chair bound patient


HEENT: EOMI, slight L sided facial droop noticed


CV: RRR, end-diastolic vs regurgitant systolic murmur? 


Pulm: CTAB, no wheezing, rales, rhonchi


Abdomen: Soft, Non-tender


Extremity: Trace pedal edema bilaterally, no calf tenderness, Slight swelling in

right knee, no erythema noted at this time


Psych: A+Ox4, normal affect, normal mood





Assessment: 


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 


Infective Endocarditis of Atrial Valve


Dysarthria 


Right Knee Effusion 


Sacral Decubitis Ulcer


Dysphagia


Anemia


T2DM - ID


Hypothyroidism


Constipation


Hyperlipidemia


GERD








Plan: 


Resume medications


PT/OT


Wound Care


Bowel Regimen 


QOD Labs to trend 


Monitor R Knee





DVT: Heparin





JANIE CHRISTIE DO 3/14/23 0517:


Supervisory-Addendum Brief


Verification & Attestation


Participated in pt care:  history, MDM, physical


Personally performed:  exam, history, MDM, supervision of care


Care discussed with:  Medical Student


Procedures:  n/a


Results interpretation:  Verified all documentation


Verification and Attestation of Medical Student E/M Service





A medical student performed and documented this service in my presence. I 

reviewed and verified all information documented by the medical student and made

modifications to such information, when appropriate. I personally performed the 

physical exam and medical decision making. 





 Janie Christie, Mar 14, 2023,05:17











YOUNGER,RORY                 Mar 13, 2023 14:03


JANIE CHRISTIE DO                Mar 14, 2023 05:17

## 2023-03-13 NOTE — CONSULTATION REPORT
DATE OF SERVICE: 03/13/2023



ADMITTING PHYSICIAN:

Dr. Cook.



HISTORY OF PRESENT ILLNESS:

The patient is a 57-year-old female who developed abdominal pain approximately 3

months ago.  This worsened over time and she was found to have a posterior 

duodenal ulcer and then she underwent a diagnostic laparoscopy as well as Jc

patch formation.  After this repair, it sounds as though she did develop an 

abscess and did have some type of infectious process and was readmitted.  During

that readmission, she developed right-sided weakness and was not found to have 

any traditional type of stroke; however, they feel that some type of infectious 

emboli in the bloodstream may have caused the stroke causing the right-sided 

weakness.  Since that time, she was at [facility] for approximately 2 weeks 

and eventually transferred to [facility] for approximately 5 weeks.  

During that timeframe, she did develop a decubitus ulcer overlying the sacrum.  

She has had a wound VAC in place and the open wound has improved from what the 

 states.  Our wound care nursing has evaluated the wound and we do not 

have the proper foam kit for this; however, we are in the process of ordering 

them.  The wound was evaluated today and 3 x 1 x 2.5 cm in size with a small 

amount of tunneling at the 3 o'clock position.  Temporarily, we started 

wet-to-dry packings to the open wound.  She is in inpatient rehabilitation at 

this time as well.



PAST MEDICAL HISTORY:

Hypertension, peptic ulcer disease, hypercholesterolemia, diabetes.



PAST SURGICAL HISTORY:

Laparoscopic cholecystectomy, diagnostic laparoscopy and Jc patch placement.



ALLERGIES:

NO KNOWN DRUG ALLERGIES.



MEDICATIONS:

Amitriptyline 25 mg daily, atorvastatin 40 mg daily, famotidine 20 mg b.i.d., 

iron 220 mg b.i.d., glargine insulin 12 units b.i.d., lispro insulin q.6h, 

Prevacid 30 mg daily, levothyroxine 100 mcg IV daily, lidocaine patch daily, 

melatonin daily, pregabalin 75 mg b.i.d., Carafate 1 gram q.i.d.



SOCIAL HISTORY:

Negative smoke, negative alcohol.



FAMILY HISTORY:

Mother with some type of uterine or cervical cancer.



VITAL SIGNS: Temperature 36.7, blood pressure 108/53, pulse 106, respirations 

20, pulse ox 90% on room air.



REVIEW OF SYSTEMS:

A well-nourished female, currently in no acute distress.  She is awake and alert

and does answer questions appropriately.  She does have significant right-sided 

weakness; however, is able to lift her arm and slightly move her right toes.  

She is unable to  my fingers.  No shortness of breath or difficulty 

breathing.  No cough or sputum production.  No chest pain, palpitations, 

diaphoresis.  She has a gastrostomy tube, which is functioning with no 

residuals.  Normal bowel movements.  No fever or chills, no recent inadvertent 

weight loss.  All other review of systems negative.



PHYSICAL EXAMINATION:

CHEST:  Clear.  Good breath sounds bilaterally.

HEART:  Regular.  No murmurs.

EXTREMITIES:  No lower extremity edema, negative Homans sign.

HEENT:  No scleral icterus.  No cervical lymphadenopathy.

GASTROINTESTINAL:  Abdomen is soft, nontender, nondistended.

SKIN:  There is an open wound on the skin overlying the sacrum a few centimeters

deep with good granulation bed.  No surrounding redness or erythema, no purulent

drainage.



ASSESSMENT AND PLAN:

A 57-year-old female with a left-sided stroke with right hemiplegia, now in 

inpatient rehabilitation with a sacral decubitus ulcer.  She has had a wound VAC

in place for approximately 5-6 weeks and the wound is granulating in well.  At 

this time, we do not have the specific components for the sacral wound; however,

we will try to order these and gets the wound VAC back in place.  For the time 

being, we will proceed with packing with wet-to-dry dressings b.i.d..

















Job ID: [id number]

DocumentID: [id number]

Dictated Date: 03/13/2023 19:06:18

Transcription Date: 03/13/2023 19:25:00

Dictated By: JANETH HYMAN MD

## 2023-03-14 VITALS — DIASTOLIC BLOOD PRESSURE: 49 MMHG | SYSTOLIC BLOOD PRESSURE: 107 MMHG

## 2023-03-14 VITALS — DIASTOLIC BLOOD PRESSURE: 58 MMHG | SYSTOLIC BLOOD PRESSURE: 105 MMHG

## 2023-03-14 LAB
ALBUMIN SERPL-MCNC: 2.6 GM/DL (ref 3.2–4.5)
ALP SERPL-CCNC: 94 U/L (ref 40–136)
ALT SERPL-CCNC: 10 U/L (ref 0–55)
BASOPHILS # BLD AUTO: 0 10^3/UL (ref 0–0.1)
BASOPHILS NFR BLD AUTO: 1 % (ref 0–10)
BILIRUB SERPL-MCNC: 0.3 MG/DL (ref 0.1–1)
BUN/CREAT SERPL: 31
CALCIUM SERPL-MCNC: 9.1 MG/DL (ref 8.5–10.1)
CHLORIDE SERPL-SCNC: 102 MMOL/L (ref 98–107)
CO2 SERPL-SCNC: 26 MMOL/L (ref 21–32)
CREAT SERPL-MCNC: 0.62 MG/DL (ref 0.6–1.3)
EOSINOPHIL # BLD AUTO: 0.2 10^3/UL (ref 0–0.3)
EOSINOPHIL NFR BLD AUTO: 3 % (ref 0–10)
GFR SERPLBLD BASED ON 1.73 SQ M-ARVRAT: 103 ML/MIN
GLUCOSE SERPL-MCNC: 158 MG/DL (ref 70–105)
HCT VFR BLD CALC: 31 % (ref 35–52)
HGB BLD-MCNC: 9.2 G/DL (ref 11.5–16)
LYMPHOCYTES # BLD AUTO: 1.1 10^3/UL (ref 1–4)
LYMPHOCYTES NFR BLD AUTO: 17 % (ref 12–44)
MANUAL DIFFERENTIAL PERFORMED BLD QL: NO
MCH RBC QN AUTO: 23 PG (ref 25–34)
MCHC RBC AUTO-ENTMCNC: 30 G/DL (ref 32–36)
MCV RBC AUTO: 78 FL (ref 80–99)
MONOCYTES # BLD AUTO: 0.5 10^3/UL (ref 0–1)
MONOCYTES NFR BLD AUTO: 8 % (ref 0–12)
NEUTROPHILS # BLD AUTO: 4.5 10^3/UL (ref 1.8–7.8)
NEUTROPHILS NFR BLD AUTO: 71 % (ref 42–75)
PLATELET # BLD: 349 10^3/UL (ref 130–400)
PMV BLD AUTO: 9.1 FL (ref 9–12.2)
POTASSIUM SERPL-SCNC: 4.1 MMOL/L (ref 3.6–5)
PROT SERPL-MCNC: 6.4 GM/DL (ref 6.4–8.2)
SODIUM SERPL-SCNC: 137 MMOL/L (ref 135–145)
WBC # BLD AUTO: 6.3 10^3/UL (ref 4.3–11)

## 2023-03-14 RX ADMIN — PREGABALIN SCH MG: 75 CAPSULE ORAL at 22:52

## 2023-03-14 RX ADMIN — SUCRALFATE SCH GM: 1 TABLET ORAL at 17:19

## 2023-03-14 RX ADMIN — FOLIC ACID SCH MG: 1 TABLET ORAL at 08:23

## 2023-03-14 RX ADMIN — SODIUM CHLORIDE SCH MG: 900 INJECTION, SOLUTION INTRAVENOUS at 15:26

## 2023-03-14 RX ADMIN — LIDOCAINE SCH EA: 50 PATCH CUTANEOUS at 08:23

## 2023-03-14 RX ADMIN — INSULIN ASPART SCH UNIT: 100 INJECTION, SOLUTION INTRAVENOUS; SUBCUTANEOUS at 16:29

## 2023-03-14 RX ADMIN — SUCRALFATE SCH GM: 1 TABLET ORAL at 22:51

## 2023-03-14 RX ADMIN — SUCRALFATE SCH GM: 1 TABLET ORAL at 12:16

## 2023-03-14 RX ADMIN — INSULIN ASPART SCH UNIT: 100 INJECTION, SOLUTION INTRAVENOUS; SUBCUTANEOUS at 06:13

## 2023-03-14 RX ADMIN — DOCUSATE SODIUM SCH MG: 100 CAPSULE ORAL at 20:30

## 2023-03-14 RX ADMIN — AMITRIPTYLINE HYDROCHLORIDE SCH MG: 25 TABLET, FILM COATED ORAL at 22:51

## 2023-03-14 RX ADMIN — INSULIN ASPART SCH UNIT: 100 INJECTION, SOLUTION INTRAVENOUS; SUBCUTANEOUS at 11:06

## 2023-03-14 RX ADMIN — POLYETHYLENE GLYCOL (3350) SCH GM: 17 POWDER, FOR SOLUTION ORAL at 20:30

## 2023-03-14 RX ADMIN — PREGABALIN SCH MG: 75 CAPSULE ORAL at 08:23

## 2023-03-14 RX ADMIN — SODIUM BICARBONATE SCH ML: 84 INJECTION, SOLUTION INTRAVENOUS at 06:14

## 2023-03-14 RX ADMIN — SUCRALFATE SCH GM: 1 TABLET ORAL at 06:13

## 2023-03-14 RX ADMIN — DOCUSATE SODIUM SCH MG: 100 CAPSULE ORAL at 08:24

## 2023-03-14 RX ADMIN — Medication SCH EACH: at 08:22

## 2023-03-14 RX ADMIN — FAMOTIDINE SCH MG: 20 TABLET, FILM COATED ORAL at 08:22

## 2023-03-14 RX ADMIN — INSULIN ASPART SCH UNIT: 100 INJECTION, SOLUTION INTRAVENOUS; SUBCUTANEOUS at 22:51

## 2023-03-14 RX ADMIN — MELATONIN 3 MG ORAL TABLET SCH MG: 3 TABLET ORAL at 22:51

## 2023-03-14 RX ADMIN — DOCUSATE SODIUM AND SENNOSIDES SCH EA: 8.6; 5 TABLET, FILM COATED ORAL at 08:24

## 2023-03-14 RX ADMIN — POLYETHYLENE GLYCOL (3350) SCH GM: 17 POWDER, FOR SOLUTION ORAL at 08:24

## 2023-03-14 RX ADMIN — HYDROCODONE BITARTRATE AND ACETAMINOPHEN PRN EA: 5; 325 TABLET ORAL at 06:14

## 2023-03-14 RX ADMIN — Medication SCH EACH: at 22:51

## 2023-03-14 RX ADMIN — INSULIN ASPART SCH UNIT: 100 INJECTION, SOLUTION INTRAVENOUS; SUBCUTANEOUS at 16:30

## 2023-03-14 RX ADMIN — FAMOTIDINE SCH MG: 20 TABLET, FILM COATED ORAL at 22:52

## 2023-03-14 RX ADMIN — LEVOTHYROXINE SODIUM SCH MCG: 100 TABLET ORAL at 06:13

## 2023-03-14 RX ADMIN — DOCUSATE SODIUM AND SENNOSIDES SCH EA: 8.6; 5 TABLET, FILM COATED ORAL at 20:30

## 2023-03-14 RX ADMIN — ENOXAPARIN SODIUM SCH MG: 100 INJECTION SUBCUTANEOUS at 17:19

## 2023-03-14 NOTE — ST COGNITIVE LINGUISTIC EVAL
Speech Evaluation-General


Medical Diagnosis


CVA, (R) Hemiparesis


Onset Date:  Jan 28, 2023





Therapy Diagnosis


Therapy Diagnosis:  Dysarthria, Expressive Aphasia, Paraphasia





Precautions


Precautions:  Fall, Pressure Ulcer, Aspiration


Precautions/Isolations:  Contact Isolation, Aspiration, Fall Prevention, 

Pressure Ulcer





Referral


Referring Physician:  Dr. Cook


Reason for Referral:  Evaluation/Treatment





Medical History


Pertinent Medical History:  DM, Hypothroidism, Neuropathy


Reviewed History:  Yes





Social History


Current Living Status:  Spouse





Speech PLF-Current Status


Prior Level of Function





The patient (and family) denied prior challenges with speech, language,


cognition or swallowing.





Subjective


The patient was seated upright in her bed, awake and alert, upon entrance to her

room by the clinician. The patient has two family members present at bedside, 

who remain for completion of the assessment. The patient is agreeable with milvia liz in the cognitive linguistic evaluation.





Language Eval: Auditory


Comprehends Simple Yes/No Ques:  Functional


Ident/Pics in Multiple Fields:  Functional (The patient is able to identify 

objects, however, paraphasia (whole word) is present during verbal attempts.)


Follows 1-Step Commands:  Functional


Follows General Conversations:  Functional





Language Eval: Verbal Language


Completes Spontaneous Greeting:  Functional


Produces Auto, Serial Info:  Moderate


Word Finding:  Moderate


Requests Basic Needs:  Moderate


States Basic Personal Info:  Moderate





Language Evaluation: Reading


The patient is experiencing double vision which makes reading difficult at this 

time. Per patient, "I look at it once and it's right but I look away and look 

back and there is two."





Objective Cognitive Domain


Cognitive assessment will take place following advancement in expressive 

language skills.





Objective


Oral Motor/Speech Production


The patient displays decreased right labial strength, range of motion and 

coordination. Lingual protrusion is at midline.





The patient presents with dysarthria characterized by imprecise articulation and

a reduced rate of speech. The patient remains greater than 75% intelligible at 

the short phrase and single word level (known).


Impression


The patient displays moderate dysarthria (consistent with flaccid type) and 

moderate expressive aphasia (with whole word paraphasia present). The patient is

an excellent candidate for skilled speech pathology treatment, displaying a high

level of stimulability throughout trialed therapy approaches and demonstrated 

excellent motivation for improvement.





Speech Short Term Goals


Short Term Goals


Short Term Goals


1. The patient will display 90% accuracy with confrontational naming of familiar

objects, independently.


Time Frame-STG:  One Week.





Speech Long Term Goals


Long Term Goals


1. The patient will display improved cognitive linguistic skills for safe 

discharge to the least restrictive environment.


Time Frame:  Three Weeks.





Speech-Plan


Treatment Plan


Speech Therapy Treatment Plan:  Continue Plan of Care


Treatment Duration:  Apr 4, 2023


Frequency:  Modified Program (IRF)


Estimated Hrs Per Day:  .5 hour per day


Rehab Potential:  Fair


Pt/Family Agrees to Plan:  Yes





Safety Risks/Education


Teaching Recipient:  Patient, Family


Teaching Methods:  Discussion


Response to Teaching:  Verbalize Understanding


Education Topics Provided:  


Results, Recommendations, Plan of Care





Time


Speech Therapy Time In:  14:50


Speech Therapy Time Out:  15:30


DATE:  Mar 14, 2023


Total Billed Time:  40


Billed Treatment Time


1, KAMERON ALAMO [address]               Mar 14, 2023 15:45 [FreeTextEntry1] : lifestyle/dietary mod\par thyroid sono\par f/u after testing done

## 2023-03-14 NOTE — PM&R PROGRESS NOTE
Subjective


HPI/CC On Admission


Date Seen by Provider:  Mar 14, 2023


Time Seen by Provider:  09:00


Subjective/Events-last exam


3/14/2023:


Doing much better


Reviewed back history on her pre-existing debility:


10/5/22 gastric ulcer perforation but used wheelchair periodically prior to that

due to neuropathy


11/4/22 Decubitus ulcer admit stage III wound vac placed


1/29/23 used walker but wheelchair for long distances


Speech will see her today





Review of Systems


General:  Fatigue, Malaise


Neurological:  Weakness, Incoordination





Objective


Exam


Vital Signs





Vital Signs








  Date Time  Temp Pulse Resp B/P (MAP) Pulse Ox O2 Delivery O2 Flow Rate FiO2


 


3/14/23 21:00     93 Room Air  


 


3/14/23 20:00 36.9 91 20 107/49 (68)    


 


3/14/23 08:47       2.00 





Capillary Refill :


General Appearance:  No Apparent Distress, WD/WN, Anxious, Chronically ill


HEENT:  PERRL/EOMI, Normal ENT Inspection, Pharynx Normal


Neck:  Full Range of Motion, Normal Inspection, Non Tender, Supple, Carotid 

Bruit


Respiratory:  Chest Non Tender, Lungs Clear, Normal Breath Sounds, No Accessory 

Muscle Use, No Respiratory Distress


Cardiovascular:  Regular Rate, Rhythm, No Edema, No Gallop, No JVD, No Murmur, 

Normal Peripheral Pulses


Gastrointestinal:  Normal Bowel Sounds, No Organomegaly, No Pulsatile Mass, Non 

Tender, Soft


Back:  Normal Inspection, No CVA Tenderness, No Vertebral Tenderness


Extremity:  Normal Capillary Refill, Normal Inspection, Normal Range of Motion 

(except right side), Non Tender, No Calf Tenderness, No Pedal Edema


Neurologic/Psychiatric:  Alert, Oriented x3, CNs II-XII Norm as Tested, Abnormal

CNs II-XII, Depressed Affect, Facial Droop (right ), Motor Weakness (right sided

weakness)


Skin:  Normal Color, Warm/Dry


Lymphatic:  No Adenopathy





Results/Procedures


Lab


Laboratory Tests


3/14/23 04:58








Patient resulted labs reviewed.





FIM


Transfers


Therapy Code Descriptions/Definitions 





Functional [place] Measure:


0=Not Assessed/NA        4=Minimal Assistance


1=Total Assistance        5=Supervision or Setup


2=Maximal Assistance  6=Modified [place]


3=Moderate Assistance 7=Complete IndependenceSCALE: Activities may be completed 

with or without assistive devices.





6-Indepedent-patient completes the activity by him/herself with no assistance 

from a helper.


5-Set-up or Clean-up Assistance-helper sets up or cleans up; patient completes 

activity. [place] assists only prior to or  


    following the activity.


4-Supervision or Touching Assistance-helper provides verbal cues and/or 

touching/steadying and/or contact guard assistance as patient completes 

activity. Assistance may be provided   


    throughout the activity or intermittently.


3-Partial/Moderate Assistance-helper does LESS THAN HALF the effort. [place] 

lifts, holds or supports trunk or limbs, but provides less than half the effort.


2-Substantial/Maximal Assistance-helper does MORE THAN HALF the effort. [place] 

lifts or holds trunk or limbs and provides more than half the effort.


0-Mwyprwwnc-qenwex does ALL the effort. Patient does none of the effort to 

complete the activity. Or, the assistance of 2 or more helpers is required for 

the patient to complete the  


    activity.


If activity was not attempted, code reason:


7-Patient Refused.


9-Not Applicable-not attempted and the patient did not perform the activity 

before the current illness, exacerbation or injury.


10-Not Attempted due to Environmental Limitations-(lack of equipment, weather 

restraints, etc.).


88-Not Attempted due to Medical Conditions or Safety Concerns.


Roll Left to Right (QC):  3 (mod (A) to roll fully (L) x 2, once to assist 

nursing with wound dressing, with assist of bed rail)


Sit to Lying (QC):  3 (Mod (A) with LE's and assist to bring shoulders to center

of bed afterward.)


Sit to Stand (QC):  1 (standing lift)


Chair/Bed-to-Chair Xfer(QC):  1 (Standing lift)


Car Transfer (QC):  88





Gait Training


Does the Patient Walk?:  No and Walking Goal NOT indicated


Walk 10 feet (QC):  88


Walk 50 ft with 2 Turns(QC):  88


Walk 150 ft (QC):  88


Walking 10ft/uneven surface-QC:  88





Wheelchair Training


Does the Pt Use a Wheelchair?:  Yes


Wheel 50 ft with 2 turns (QC):  2 (Patient helps propel with (L) UE, but 

requires extensive assist for negotation)


Wheel 150 ft (QC):  88 (not attempted due to fatigue /p standing and 50' 

propulsion to group)


Type of Wheelchair:  Manual





Stair Training


1 Step (curb) (QC):  88


4 Steps (QC):  88


12 Steps (QC):  88





Balance


Picking up an Object (QC):  88





ADL-Treatment


Eating (QC):  88 (PEG tube.)


Oral Hygiene (QC):  2


Shower/Bathe Self (QC):  1 (Lift required to was buttocks.)


Upper Body Dressing (QC):  2


Lower Body Dressing (QC):  1 (lift required for pant hike.)


On/Off Footwear (QC):  2


Toileting Hygiene (QC):  1 (lift required for hygiene and pant hike.)





Assessment/Plan


Assessment and Plan


Assess & Plan/Chief Complaint


Assessment:


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


Infective Endocarditis of Atrial Valve


Dysarthria/expressive aphasia


Dysphagia with PEG tube


Right Knee Effusion 


Sacral Decubitus Ulcer


Anemia


T2DM - ID


Hypothyroidism


Constipation


Hyperlipidemia


GERD


Chronic debility: (10/5/22 gastric ulcer perforation but used wheelchair 

periodically prior to that due to neuropathy, 11/4/22 Decubitus ulcer admit 

stage III wound vac placed, 1/29/23 used walker but wheelchair for long 

distances)





Plan:


Monitor closely


Change Synthroid to PO


ST consult for dysphagia


Utilize PEG


PT OT





3/14/2023:


Monitor closely


Speech therapy to advanced diet if able





(1) CVA (cerebral vascular accident)











COOKIE CHRISTIE DO                Mar 14, 2023 05:54

## 2023-03-14 NOTE — PROGRESS NOTE
Subjective


Date Seen by a Provider:  Mar 14, 2023


Time Seen by a Provider:  14:00


Subjective/Events-last exam


doing well. wound bed clean. culture growing corynebacterium species. getting 

midline. tolerating therapy.





Objective


Exam





Vital Signs








  Date Time  Temp Pulse Resp B/P (MAP) Pulse Ox O2 Delivery O2 Flow Rate FiO2


 


3/14/23 08:47     97 Nasal Cannula 2.00 


 


3/14/23 08:00 36.5 87 14 105/58 (74) 95 Nasal Cannula 2.00 


 


3/14/23 07:25      Nasal Cannula 3.00 


 


3/13/23 21:00     97 Room Air  


 


3/13/23 20:03 36.5 86 16 125/68 (87) 97 Room Air  


 


3/13/23 17:06      Room Air  














I & O 


 


 3/14/23





 07:00


 


Intake Total 1057 ml


 


Output Total 775 ml


 


Balance 282 ml





Capillary Refill :


General Appearance:  No Apparent Distress


HEENT:  PERRL/EOMI


Neck:  Full Range of Motion


Respiratory:  Chest Non Tender, Decreased Breath Sounds


Cardiovascular:  Regular Rate, Rhythm


Gastrointestinal:  normal bowel sounds, non tender, soft


Extremity:  Normal Capillary Refill


Neurologic/Psychiatric:  Alert, Oriented x3


Skin:  Other (wond bed clean with granulationg bed)


Lymphatic:  No Adenopathy





Results


Lab


Laboratory Tests


3/13/23 16:23: Glucometer 154H


3/13/23 20:02: Glucometer 123H


3/14/23 04:58: 


White Blood Count 6.3, Red Blood Count 3.94, Hemoglobin 9.2L, Hematocrit 31L, 

Mean Corpuscular Volume 78L, Mean Corpuscular Hemoglobin 23L, Mean Corpuscular 

Hemoglobin Concent 30L, Red Cell Distribution Width 19.9H, Platelet Count 349, 

Mean Platelet Volume 9.1, Immature Granulocyte % (Auto) 0, Neutrophils (%) 

(Auto) 71, Lymphocytes (%) (Auto) 17, Monocytes (%) (Auto) 8, Eosinophils (%) 

(Auto) 3, Basophils (%) (Auto) 1, Neutrophils # (Auto) 4.5, Lymphocytes # (Auto)

1.1, Monocytes # (Auto) 0.5, Eosinophils # (Auto) 0.2, Basophils # (Auto) 0.0, 

Immature Granulocyte # (Auto) 0.0, Sodium Level 137, Potassium Level 4.1, 

Chloride Level 102, Carbon Dioxide Level 26, Anion Gap 9, Blood Urea Nitrogen 

19H, Creatinine 0.62, Estimat Glomerular Filtration Rate 103, BUN/Creatinine 

Ratio 31, Glucose Level 158H, Calcium Level 9.1, Corrected Calcium 10.2H, Total 

Bilirubin 0.3, Aspartate Amino Transf (AST/SGOT) 18, Alanine Aminotransferase 

(ALT/SGPT) 10, Alkaline Phosphatase 94, Total Protein 6.4, Albumin 2.6L


3/14/23 10:43: Glucometer 109


3/14/23 11:00: Glucometer 130H





Microbiology


3/13/23 Gram Stain - Final, Resulted


          


3/13/23 Wound Culture - Preliminary, Resulted


          Staphylococcus aureus


          Gram Positive Cocci In Chains


          Coryneform bacteria





Assessment/Plan


Assessment/Plan


Assess & Plan/Chief Complaint


hx perforated PUD, left ischmic stroke with sacral decubitus ulcer.


cont wet to dry dressing for now. vac scheduled for tomorrow.


cont therapy.


cont IV abx.











JANETH HYMAN MD                Mar 14, 2023 14:45

## 2023-03-14 NOTE — PHYSICAL THERAPY DAILY NOTE
PT Daily Note-Current


Subjective


Patient reports she does have some discomfort at her sacral wound sitting in 

recliner.  She is agreeable to work with PT this afternoon.


Mother and another family member present in room.  Family member mentioned that 

patient was given apple juice for breakfast and drank 1/2 container (without 

thickener) without any issues, no coughing, no choking this a.m. -- asked that 

they please discuss this with SLP this pm during that evaluation.





Pain





   Numeric Pain Scale:  5-Moderate Pain


   Location Body Site:  Sacrum


Section J - Health Conditions


1. Rarely or not at all


2. Occasionally


3. Frequently


4. Almost constantly


8. Unable to answer


Pain Effect on Sleep:  1


Pain Interference with Therapy:  1


Pain Interference w/Day-to-Day:  1





Appearance


Patient sitting up in recliner with PEG feeding in progress.  Clay[place] cath.





Mental Status


Patient Orientation:  Person, Place, Situation


Attachments:  PEG Tube, [place] Catheter





Transfers


SCALE: Activities may be completed with or without assistive devices.





6-Indepedent-patient completes the activity by him/herself with no assistance 

from a helper.


5-Set-up or Clean-up Assistance-helper sets up or cleans up; patient completes 

activity. [place] assists only prior to or  


    following the activity.


4-Supervision or Touching Assistance-helper provides verbal cues and/or 

touching/steadying and/or contact guard assistance as patient completes 

activity. Assistance may be provided   


    throughout the activity or intermittently.


3-Partial/Moderate Assistance-helper does LESS THAN HALF the effort. [place] 

lifts, holds or supports trunk or limbs, but provides less than half the effort.


2-Substantial/Maximal Assistance-helper does MORE THAN HALF the effort. [place] 

lifts or holds trunk or limbs and provides more than half the effort.


6-Yjsmjtwuz-flxqqo does ALL the effort. Patient does none of the effort to 

complete the activity. Or, the assistance of 2 or more helpers is required for 

the patient to complete the  


    activity.


If activity was not attempted, code reason:


7-Patient Refused.


9-Not Applicable-not attempted and the patient did not perform the activity 

before the current illness, exacerbation or injury.


10-Not Attempted due to Environmental Limitations-(lack of equipment, weather 

restraints, etc.).


88-Not Attempted due to Medical Conditions or Safety Concerns.


Chair/Bed-to-Chair Xfer(QC):  1 (Standing lift utilized for recliner to bed 

transfer.  After assisting patient to grasp stander with (R) hand, she required 

assist at (R) elbow to maintain (R) . No dizziness today with standing 

lift.)


Patient demonstrates good static sitting balance at EOB following transfer.


Patient required max (A) of 1 to lift legs onto bed for sit>supine(laying down 

toward (R)/affected side) this pm.


Rolling (R): cues to bend (L) leg up and cues to reach across body with (L) UE 

and grasp bed rail = min (A) to fully roll to place pillows behind back (for 

pressure relief of sacral wound). Pillows also placed between knees and to 

support (R) hand for patient comfort.





Weight Bearing


Weight Bearing/Tolerated


Weight Bearing/Tolerated





Exercises


LE exercise seated in recliner:


LAQ (L) x 15 with patient counting reps, needed cues for numbers greater than 10


(R) LE LAQ with partial extension range actively, then assisted to full 

extension for :05 hamstring stretch, then patient cued to flex knee against 

therapist resistance - 10 reps.


(R) partial range DF, then assisted to full DF for :05 heel cord stretch,  a

lternating with resisted PF x 10 reps


With (B) feet on floor, heel raises x 10 actively with cues for each rep


With(B) feet on floor , toe raises x 10 - decreased DF range (B), but able to 

perform partial range (B)


March in place with tactile cues at (B) knees - able to obtain full foot 

clearance off floor (B), with less range as expected on (R).





Seated, with patient holding (R) UE in "cradle" position, therapist holding 

elbows:


-Trunk flexion/extension x 10, static hold in forward flexion "felt good" to low

back and sacral wound.  Discussed importance of pressure relief in sitting to 

avoid decubitus ulcers.


-Trunk lateral flexion (R) /(L) x 10 (B).





Trunk disassocation with therapist holding patients forearms x 10 (B).





Assessment


Patient demonstrates improved (R) DF actively this p.m.





PT Long Term Goals


Long Term Goals


PT Long Term Goals Time Frame:  May 8, 2023


Roll Left & Right (QC):  4


Sit to Lying (QC):  4


Lying-Sitting on Side/Bed(QC):  4


Sit to Stand (QC):  2


Chair/Bed-to-Chair Xfer(QC):  2


Toilet Transfer (QC):  2


Car Transfer (QC):  2


Does the Patient Walk:  No and Walking Goal NOT indicated


Walk 10 feet (QC):  1


Walk 50ft with 2 Turns (QC):  88


Walk 150 ft (QC):  88


Walking 10ft on Uneven Surface:  88


1 Step (curb) (QC):  88


4 Steps (QC):  88


12 Steps (QC):  88


Picking up an Object (QC):  88


Does the Pt use WC or Scooter?:  Yes


Wheel 50 feet with 2 turns (QC:  5 (Patient able to use (L) LE and (B) LE's for 

w/c propulsion)


Type:  Manual


Wheel 150 feet:  5


Type:  Manual





PT Plan


Problem List


Problem List:  Activity Tolerance, Functional Strength, Safety, Balance, 

Transfer, Bed Mobility, Other (w/c mobility deficits)





Treatment/Plan


Treatment Plan:  Continue Plan of Care


Treatment Plan:  Bed Mobility, Concurrent Therapy, Education, Functional 

Activity Russ, Functional Strength, Group Therapy, Safety, Therapeutic 

Exercise, Transfers, Other (W/C mobility)


Treatment Duration:  May 8, 2023


Frequency:  At least 5 of 7 days/Wk (IRF)


Estimated Hrs Per Day:  1.5 hours per day


Patient and/or Family Agrees t:  Yes





Safety Risks/Education


Safety Risk Comments:  Discussed pressure relief in sitting to avoid sacral 

wounds


Teaching Recipient:  Patient, Family


Teaching Methods:  Demonstration, Discussion


Response to Teaching:  Verbalize Understanding





Discharge Recommendations


Plan


Continue PT per POC with focus on continued (R) LE/UE strengthening, standing 

endurance with standing lift or standing frame, progressing to // bars if able 

to tolerate.  Needs w/c mobility training as well, using (L) UE and LE in lieu 

of 1-arm drive w/c.





Time


Time In:  1245


Time Out:  1340


DATE:  Mar 14, 2023


Total Billed Treatment Time:  55


Total Billed Treatment


30' ther ex,


25' functional activity











Laurel Heredia PT               Mar 14, 2023 14:07

## 2023-03-14 NOTE — INDIVIDUALIZED PLAN OF CARE
Individualized Plan of Care


Rehab Nursing IPOC Order


Admission Date


Mar 13, 2023 at 12:25


Current Orders





Orders


Admission Order(Inpt,Obs,Sdc) (3/13/23 10:41)


Vital Signs: Per Unit Policy ( 08,16,00 (3/13/23 10:41)


Thomas Bacon 09,21 (3/13/23 10:41)


Sequential Compression Device (3/13/23 10:41)


-Inpt Rehab Con (3/13/23 10:41)


Rehab Nursing Orders-Ipoc (3/13/23 10:41)


Physical Therapy Rehab Orders (3/13/23 10:41)


Occupational Therapy Rehab Ord (3/13/23 10:41)


Speech Therapy Rehab Orders (3/13/23 10:41)


Cbc With Automated Diff (3/14/23 06:00)


Comprehensive Metabolic Panel (3/14/23 06:00)


Precautions (Aru) (3/13/23 10:41)


Weekly Weight WEEK (3/13/23 10:41)


Rehab-Intensity Of Therapy (3/13/23 10:41)


Initiate Admission Nursing Pro .admission (3/13/23 10:41)


Alprazolam Tablet (Xanax Tablet) (3/13/23 10:45)


Calcium Carbonate Chew Tablet (Antacid C (3/13/23 10:45)


Diphenhydramine Tablet (Benadryl Tablet) (3/13/23 10:45)


Docusate Sodium Capsule (Colace Capsule) (3/13/23 21:00)


Docusate Sodium Capsule (Colace Capsule) (3/13/23 10:45)


Bisacodyl Suppository (Dulcolax Supposit (3/13/23 10:45)


Lactulose Oral Solution (Enulose Oral So (3/13/23 10:45)


Na Phos/Na Biphos Enema (Fleet Enema Kodi (3/13/23 10:45)


Guaifenesin/Codeine Syrup (Robitussin Ac (3/13/23 10:45)


Loperamide Tablet (Imodium Tablet) (3/13/23 10:45)


Melatonin  Tablet (Melatonin  Tablet) (3/13/23 10:45)


Polyethylene Glycol Powder Pkt (Miralax (3/13/23 21:00)


Ondansetron  Oral Dissolve Tab (Zofran (3/13/23 10:45)


Senna S Tablet (Senokot S Tablet) (3/13/23 21:00)


Acetaminophen Tablet/Caplet (Tylenol  T (3/13/23 10:45)


Initiate Admission Nursing Pro .admission (3/13/23 10:41)


Admission Arrival Bed Request (3/13/23 12:21)


Acetaminophen Tablet/Caplet (Tylenol  T (3/13/23 13:15)


Amitriptyline Tablet (Elavil Tablet) (3/13/23 21:00)


Atorvastatin Tablet (Lipitor Tablet) (3/13/23 21:00)


Rx-Cyclobenzaprine Tablet (Rx-Flexeril T (3/13/23 13:15)


Famotidine Tablet (Pepcid Tablet) (3/13/23 21:00)


Folic Acid Tablet (Folic Acid Tablet) (3/14/23 09:00)


Hydrocodone/Apap 5/325 Tablet (Lortab 5 (3/13/23 13:15)


Lactobacillus Acidophilus Cap (Acidophil (3/13/23 21:00)


Lactulose Oral Solution (Enulose Oral So (3/13/23 13:15)


Levothyroxine Injection (Synthroid Injec (3/14/23 09:00)


Lidocaine 4% Patch (Salonpas 4% Patch) (3/14/23 09:00)


Antacid  Suspension (Mylanta  Suspension (3/13/23 13:15)


Melatonin  Tablet (Melatonin  Tablet) (3/13/23 21:00)


Pregabalin Capsule (Lyrica Capsule) (3/13/23 21:00)


Sucralfate  Tablet (Carafate  Tablet) (3/13/23 16:00)


(Nf) Ferrous Sulfate (3/13/23 21:00)


(Nf) Insulin Glargine,Hum.Rec.Anlog (Wilfredo (3/13/23 21:00)


(Nf) Lansoprazole (3/14/23 09:00)


(Nf) Whey Protein Isolate (Beneprotein) (3/13/23 13:15)


Accucheck Achs ACHS (3/13/23 13:09)


Insulin Aspart (Novolog) (Novolog (Charg (3/13/23 16:00)


Enoxaparin Injection (Lovenox Injection) (3/13/23 16:00)


Tube Feeding (Diet) (3/13/23 13:13)


Feeding Tube Connector Cleanin Q8H (3/13/23 13:13)


Tube Feeding Assessment Q6H (3/13/23 13:13)


Pu4: Pureed Diet (3/13/23 Lunch)


Cyclobenzaprine Tablet (Flexeril Tablet) (3/13/23 13:30)


Ferrous Sulfate Oral Liquid (Feosol Oral (3/13/23 21:00)


Code/Resuscitation (3/13/23 13:26)


Consult General Surgery (3/13/23 13:26)


Catheter(Urinary) Insert & Ass 03,15 (3/13/23 13:26)


Catheter(Urinary) Discontinue (3/13/23 13:26)


Patch Removal (Patch Removal) (3/13/23 21:00)


Insulin Determir (Per Unit) (Levemir (Pe (3/13/23 21:00)


Water, Sterile For Irrigation (Sterile W (3/14/23 07:00)


Patient Visit (3/13/23 )


Hypochlorous Acid/Sod Chloride (Vashe Wo (3/13/23 15:30)


Wound Culture (3/13/23 15:19)


Dressing Order (Intervention) BID (3/13/23 15:31)


Levothyroxine Tablet (Synthroid Tablet) (3/14/23 06:30)


Iron Test (Fe) (3/14/23 05:54)


Vitamin B 12 (3/14/23 05:54)


Hydrocodone/Apap 10/325 Tablet (Lortab 1 (3/14/23 09:30)


Venous Access Request Order (3/14/23 09:26)


Iron Sucrose Injection (Venofer Injectio (3/14/23 09:30)


Cyanocobalamin Injection (Vitamin B-12 I (3/14/23 09:45)


Cyanocobalamin Tablet (Vitamin B-12 Tabl (3/15/23 07:00)


Pt Eval Low Complexity (3/13/23 )


Patient Visit (3/13/23 )


Therapeutic, Group (3/13/23 )


Exercise Therap, Ea 15 Min (3/13/23 )


Patient Visit (3/14/23 )


Speech Sound Lang Comp (3/14/23 )


Treat. Speech/Lang/Voice (3/14/23 )





Rehab Nursing Orders:  Ongoing Assess. of Cognitive Status, Ongoing Assess. of 

Function Status, Bladder Management, Bladder Scan, Bladder Training, Bowel 

Management, Bowel Training, Disease Management & Educaiton, DVT Prophylaxis, 

Fall Prevention, Fluid/Electrolyte/Nutrition Mgmt, Infection Prevention, 

Medication Management & Education, Management of Risks & Complications, 

Management of Skin Intergrity, Nutrition Management, Pain Management, 

Patient/Family Support, Safety Management, Wound Management





Intensity of Therapy to be met


Patient to be seen:  Min.3h per day/5 of 7d





PT IPOC


Problem List:  Activity Tolerance, Functional Strength, Safety, Balance, 

Transfer, Bed Mobility


Treatment Plan:  Continue Plan of Care


Bed Mobility, Concurrent Therapy, Education, Functional Activity Russ, 

Functional Strength, Group Therapy, Safety, Therapeutic Exercise, Transfers, 

Other (W/C mobility)


Treatment Duration:  May 8, 2023


Frequency:  At least 5 of 7 days/Wk (IRF)


Estimated Hrs Per Day:  1.5 hours per day





OT IPOC


Problems:  Decreased Activ Tolerance, Decreased Safety Aware, Decreased UE 

Strength, Dependent Transfers, Impaired Bed Mobility, Impaired Cognition, 

Impaired Coordination, Impaired Funct Balance, Impaired I ADL's, Impaired Self-

Care Skills, Restricted Funct UE ROM, Visual-Perceptual Deficit


OT Treatment, Training and Edu:  Yes


Plan of Care:  ADL Retraining, Functional Mobility, Group Exercise/Act as Ind, 

UE Funct Exercise/Act, UE Neuromus Re-Ed/Coord, Visual/Perceptual Retrain, W/C 

Management Training


Treatment Duration:  Apr 12, 2023


Frequency:  At least 5 of 7 days/Wk (IRF)


Estimated Hrs Per Day:  1.5 hours per day





ST IPOC


Speech Therapy Treatment Plan:  Continue Plan of Care


Treatment Duration:  Mar 14, 2023


Frequency:  Modified Program (IRF)


Estimated Hrs Per Day:  Other





/Case Mgmt


/Case Managemen:  Discharge Planning





Dietitian/Nutritionist


Dietitian/Nutritionist to monitor nutritional status and make changes and/or 

recommendations as needed and work with speech pathology on dietary upgrades as 

the occur.





Physician IPOC


Medical Issues being managed closely and that require the 24 hour availability 

of a physician:





Recent catastrophic CVA with right sided weakness and pre-existing decubitus 

ulcer in very chronically debilitated patient due to neuropathy will require 

close monitoring for any decompensation


Medical Issues:  Bowel/Bladder Function, DVT Prophylaxis, Falls Precautions, 

Fluid/Electrolyte/Nutrition Balance, Infection Protection, Pain Management, 

Swallowing Precautions, Weight Bearing Precautions, Wound Care


Brief Synthesis of Preadmission Screen, Post-Admission Evaluation, and Therapy 

Evaluations:





PT OT will focus on regaining function from CVA and right sided residual 

weakness with use of AD in order to regain function and increase ADL's in order 

to return home


Medical Prognosis:  Fair


Anticipated Length of Stay:  14 days











COOKIE CHRISTIE DO                Mar 14, 2023 05:54

## 2023-03-14 NOTE — PHYSICAL THERAPY DAILY NOTE
PT Daily Note-Current


Subjective


Patient in bed pre tx, agrees to PT, has no complaints of pain.  Will be co-

treating with OT due to poor patient mobility, strength, endurance, severe 

debility, coordinate UE and LE with activity, safety and reduce risk of falls.





Pain


Section J - Health Conditions


1. Rarely or not at all


2. Occasionally


3. Frequently


4. Almost constantly


8. Unable to answer


Pain Effect on Sleep:  1


Pain Interference with Therapy:  1


Pain Interference w/Day-to-Day:  1





Appearance


Patient in recliner post tx with nurse call, phone, tray, all needs met, chair 

alarm on.





Mental Status


Patient Orientation:  Person, Unable to Assess, Non-Verbal/Aphasic


Attachments:  PEG Tube





Transfers


SCALE: Activities may be completed with or without assistive devices.





6-Indepedent-patient completes the activity by him/herself with no assistance 

from a helper.


5-Set-up or Clean-up Assistance-helper sets up or cleans up; patient completes 

activity. [place] assists only prior to or  


    following the activity.


4-Supervision or Touching Assistance-helper provides verbal cues and/or 

touching/steadying and/or contact guard assistance as patient completes 

activity. Assistance may be provided   


    throughout the activity or intermittently.


3-Partial/Moderate Assistance-helper does LESS THAN HALF the effort. [place] 

lifts, holds or supports trunk or limbs, but provides less than half the effort.


2-Substantial/Maximal Assistance-helper does MORE THAN HALF the effort. [place] 

lifts or holds trunk or limbs and provides more than half the effort.


2-Qwxesnggv-ytexlo does ALL the effort. Patient does none of the effort to 

complete the activity. Or, the assistance of 2 or more helpers is required for 

the patient to complete the  


    activity.


If activity was not attempted, code reason:


7-Patient Refused.


9-Not Applicable-not attempted and the patient did not perform the activity 

before the current illness, exacerbation or injury.


10-Not Attempted due to Environmental Limitations-(lack of equipment, weather 

restraints, etc.).


88-Not Attempted due to Medical Conditions or Safety Concerns.


Roll Left & Right (QC):  3


Lying to Sitting/Side of Bed(Q:  3


Sit to Stand (QC):  1


Chair/Bed-to-Chair Xfer(QC):  1


Mod assist for supine to sit, patient was able to sit and work on sitting 

balance while brushing her teeth.  Patient then transferred to  via sit to 

stand machine.





Weight Bearing


Weight Bearing/Tolerated


Weight Bearing/Tolerated





Wheelchair Training


Does the Pt Use a Wheelchair?:  Yes


Wheel 50 ft with 2 turns (QC):  2


Wheel 150 ft (QC):  2


Type of Wheelchair:  Manual


150', 100', patient can propel with her left leg and arm, she needs a different 

WC, one that fits her better so she can more effectively use her foot to steer. 

At the end of tx, after getting back to her room she transfers again using a sit

to stand machine to recliner.





Exercises


Patient attempted to stand x3 with assist of 2 in the parallel bars but cannot 

stand completely, she seems to have difficulty bearing weight through both legs.

 Patient then did a pre-standing exercise, leaning forward and practicing 

bearing weight through her legs while sitting x10.





Treatments


PT performed bed mobility and transfers, standing, exercise, sitting balance, OT

performed ADL's, UE positioning and safety during activity, assist with 

standing.





Assessment


Current Status:  Poor Progress


patient had a lot of difficulty using her "good" leg (left one), will try using 

a standing frame tomorrow.





PT Long Term Goals


Long Term Goals


PT Long Term Goals Time Frame:  May 8, 2023


Roll Left & Right (QC):  4


Sit to Lying (QC):  4


Lying-Sitting on Side/Bed(QC):  4


Sit to Stand (QC):  2


Chair/Bed-to-Chair Xfer(QC):  2


Toilet Transfer (QC):  2


Car Transfer (QC):  2


Does the Patient Walk:  No and Walking Goal NOT indicated


Walk 10 feet (QC):  1


Walk 50ft with 2 Turns (QC):  88


Walk 150 ft (QC):  88


Walking 10ft on Uneven Surface:  88


1 Step (curb) (QC):  88


4 Steps (QC):  88


12 Steps (QC):  88


Picking up an Object (QC):  88


Does the Pt use WC or Scooter?:  Yes


Wheel 50 feet with 2 turns (QC:  5 (Patient able to use (L) LE and (B) LE's for 

w/c propulsion)


Type:  Manual


Wheel 150 feet:  5


Type:  Manual





PT Plan


Problem List


Problem List:  Activity Tolerance, Functional Strength, Safety, Balance, Gait, 

Transfer, Bed Mobility, ROM





Treatment/Plan


Treatment Plan:  Continue Plan of Care


Treatment Plan:  Bed Mobility, Concurrent Therapy, Education, Functional 

Activity Russ, Functional Strength, Group Therapy, Safety, Therapeutic 

Exercise, Transfers, Other (W/C mobility)


Treatment Duration:  May 8, 2023


Frequency:  At least 5 of 7 days/Wk (IRF)


Estimated Hrs Per Day:  1.5 hours per day


Patient and/or Family Agrees t:  Yes





Safety Risks/Education


Patient Education:  Transfer Techniques, Correct Positioning, W/C Management, 

Safety Issues


Teaching Recipient:  Patient


Teaching Methods:  Demonstration, Discussion


Response to Teaching:  Reinforcement Needed





Time


Time In:  1000


Time Out:  1100


DATE:  Mar 14, 2023


Total Billed Treatment Time:  60


Total Billed Treatment


1 visit


FA 60'





co-treated for 60'











MING HESS PT                Mar 14, 2023 11:48

## 2023-03-15 VITALS — DIASTOLIC BLOOD PRESSURE: 58 MMHG | SYSTOLIC BLOOD PRESSURE: 114 MMHG

## 2023-03-15 VITALS — DIASTOLIC BLOOD PRESSURE: 64 MMHG | SYSTOLIC BLOOD PRESSURE: 113 MMHG

## 2023-03-15 RX ADMIN — SUCRALFATE SCH GM: 1 TABLET ORAL at 21:17

## 2023-03-15 RX ADMIN — POLYETHYLENE GLYCOL (3350) SCH GM: 17 POWDER, FOR SOLUTION ORAL at 21:15

## 2023-03-15 RX ADMIN — INSULIN ASPART SCH UNIT: 100 INJECTION, SOLUTION INTRAVENOUS; SUBCUTANEOUS at 11:13

## 2023-03-15 RX ADMIN — ENOXAPARIN SODIUM SCH MG: 100 INJECTION SUBCUTANEOUS at 18:26

## 2023-03-15 RX ADMIN — SUCRALFATE SCH GM: 1 TABLET ORAL at 18:26

## 2023-03-15 RX ADMIN — Medication SCH MCG: at 06:51

## 2023-03-15 RX ADMIN — INSULIN ASPART SCH UNIT: 100 INJECTION, SOLUTION INTRAVENOUS; SUBCUTANEOUS at 21:15

## 2023-03-15 RX ADMIN — POLYETHYLENE GLYCOL (3350) SCH GM: 17 POWDER, FOR SOLUTION ORAL at 09:08

## 2023-03-15 RX ADMIN — INSULIN ASPART SCH UNIT: 100 INJECTION, SOLUTION INTRAVENOUS; SUBCUTANEOUS at 06:18

## 2023-03-15 RX ADMIN — FAMOTIDINE SCH MG: 20 TABLET, FILM COATED ORAL at 21:17

## 2023-03-15 RX ADMIN — DOCUSATE SODIUM SCH MG: 100 CAPSULE ORAL at 09:08

## 2023-03-15 RX ADMIN — Medication SCH EACH: at 21:17

## 2023-03-15 RX ADMIN — FOLIC ACID SCH MG: 1 TABLET ORAL at 10:45

## 2023-03-15 RX ADMIN — SUCRALFATE SCH GM: 1 TABLET ORAL at 10:45

## 2023-03-15 RX ADMIN — DOCUSATE SODIUM AND SENNOSIDES SCH EA: 8.6; 5 TABLET, FILM COATED ORAL at 21:15

## 2023-03-15 RX ADMIN — LIDOCAINE SCH EA: 50 PATCH CUTANEOUS at 10:46

## 2023-03-15 RX ADMIN — PREGABALIN SCH MG: 75 CAPSULE ORAL at 21:18

## 2023-03-15 RX ADMIN — AMITRIPTYLINE HYDROCHLORIDE SCH MG: 25 TABLET, FILM COATED ORAL at 21:17

## 2023-03-15 RX ADMIN — DOCUSATE SODIUM SCH MG: 100 CAPSULE ORAL at 21:15

## 2023-03-15 RX ADMIN — INSULIN ASPART SCH UNIT: 100 INJECTION, SOLUTION INTRAVENOUS; SUBCUTANEOUS at 16:44

## 2023-03-15 RX ADMIN — SODIUM BICARBONATE SCH ML: 84 INJECTION, SOLUTION INTRAVENOUS at 06:52

## 2023-03-15 RX ADMIN — Medication SCH EACH: at 10:45

## 2023-03-15 RX ADMIN — FAMOTIDINE SCH MG: 20 TABLET, FILM COATED ORAL at 10:45

## 2023-03-15 RX ADMIN — VANCOMYCIN SCH MLS/HR: 1.5 INJECTION, SOLUTION INTRAVENOUS at 23:02

## 2023-03-15 RX ADMIN — LEVOTHYROXINE SODIUM SCH MCG: 100 TABLET ORAL at 06:51

## 2023-03-15 RX ADMIN — MELATONIN 3 MG ORAL TABLET SCH MG: 3 TABLET ORAL at 21:18

## 2023-03-15 RX ADMIN — SUCRALFATE SCH GM: 1 TABLET ORAL at 06:52

## 2023-03-15 RX ADMIN — DOCUSATE SODIUM AND SENNOSIDES SCH EA: 8.6; 5 TABLET, FILM COATED ORAL at 09:08

## 2023-03-15 RX ADMIN — PREGABALIN SCH MG: 75 CAPSULE ORAL at 10:45

## 2023-03-15 NOTE — OCCUPATIONAL THER DAILY NOTE
OT Current Status-Daily Note


Subjective


Pt alert, lying in bed.  Pt agrees to therapy.  Pt aphasic though is able to 

make wants and needs known.





Mental Status/Objective


Patient Orientation:  Person, Place, Non-Verbal/Aphasic, Time, Situation





ADL-Treatment


Pt agrees to shower.  Pt states that she has had a BM, small amount.  With 

education, pt able to assist with rolling and staying on R side while assist 

given to cleanse buttocks/lisy area.  Max A for sit to EOB, independent EOB 

sitting.  Sit to stand lift to shower chair.  Pt able to complete upper body, R 

UE, lisy area and upper legs by self after set up, assist given for other areas.

 All completed in sitting on shower chair with cutout.  Sitting at sink, pt 

completed oral care by self using one-handed technique.  Max A for UBD and 

footwear.  Dependent for LBD.  Set up for eating.  After therapy, pt lying in 

bed with call light/phone in reach.  Pt positioned on R sidelying.  All needs 

met.  Nrsg aware of pt's position.


Therapy Code Descriptions/Definitions 





Functional [place] Measure:


0=Not Assessed/NA        4=Minimal Assistance


1=Total Assistance        5=Supervision or Setup


2=Maximal Assistance  6=Modified [place]


3=Moderate Assistance 7=Complete IndependenceSCALE: Activities may be completed 

with or without assistive devices.





6-Indepedent-patient completes the activity by him/herself with no assistance 

from a helper.


5-Set-up or Clean-up Assistance-helper sets up or cleans up; patient completes 

activity. [place] assists only prior to or  


    following the activity.


4-Supervision or Touching Assistance-helper provides verbal cues and/or 

touching/steadying and/or contact guard assistance as patient completes 

activity. Assistance may be provided   


    throughout the activity or intermittently.


3-Partial/Moderate Assistance-helper does LESS THAN HALF the effort. [place] 

lifts, holds or supports trunk or limbs, but provides less than half the effort.


2-Substantial/Maximal Assistance-helper does MORE THAN HALF the effort. [place] 

lifts or holds trunk or limbs and provides more than half the effort.


5-Mxibzjacq-oteyee does ALL the effort. Patient does none of the effort to 

complete the activity. Or, the assistance of 2 or more helpers is required for 

the patient to complete the  


    activity.


If activity was not attempted, code reason:


7-Patient Refused.


9-Not Applicable-not attempted and the patient did not perform the activity 

before the current illness, exacerbation or injury.


10-Not Attempted due to Environmental Limitations-(lack of equipment, weather 

restraints, etc.).


88-Not Attempted due to Medical Conditions or Safety Concerns.


Eating (QC):  5


Oral Hygiene (QC):  6


Shower/Bathe Self (QC):  3 (Mod A)


Upper Body Dressing (QC):  2


Lower Body Dressing (QC):  1


On/Off Footwear:  2


Toileting Hygiene (QC):  1


Toilet Transfer (QC):  1





OT Short Term Goals


Short Term Goals


Time Frame:  Mar 28, 2023


Eating:  3


Toileting hygiene:  3


Shower/bathe self:  3


Lower body dressing:  3


Putting on/taking off footwear:  3





OT Long Term Goals


Long Term Goals


Time Frame:  2023


Acute change in mental status:  1


Inattention:  0


Disorganized thinkin


Altered level of consciousness:  0


Eating (QC):  5


Oral Hygiene (QC):  5


Toileting Hygiene (QC):  4


Shower/Bathe Self (QC):  4


Upper Body Dressing (QC):  5


Lower Body Dressing (QC):  4


On/Off Footwear (QC):  4


Additional Goals:  1-Demonstrate ADL Tasks, 2-Verbalize Understanding, 3-

ImproveStrength/Russ


1=Demonstrate adherence to instructed precautions during ADL tasks.


2=Patient will verbalize/demonstrate understanding of assistive 

devices/modifications for ADL.


3=Patient will improve strength/tolerance for activity to enable patient to 

perform ADL's.





OT Education/Plan


Problem List/Assessment


Assessment:  Decreased Activ Tolerance, Decreased UE Strength, Dependent 

Transfers, Impaired Bed Mobility, Impaired I ADL's, Impaired Self-Care Skills, 

Restricted Funct UE ROM





Discharge Recommendations


Plan/Recommendations:  Continue POC





Treatment Plan/Plan of Care


Patient would benefit from OT for education, treatment and training to promote 

independence in ADL's, mobility, safety and/or upper extremity function for 

ADL's.


Plan of Care:  ADL Retraining, Functional Mobility, Group Exercise/Act as Ind, 

UE Funct Exercise/Act, UE Neuromus Re-Ed/Coord, Visual/Perceptual Retrain, W/C 

Management Training


Treatment Duration:  2023


Frequency:  At least 5 of 7 days/Wk (IRF)


Estimated Hrs Per Day:  1.5 hours per day


Agreement:  Yes


Rehab Potential:  Fair





Time


Start Time:  07:30


Stop Time:  08:30


DATE:  Mar 15, 2023


Total Time Billed (hr/min):  60


Billed Treatment Time


1 visit-ADL 4 (60 min)











ARTIE MC               Mar 15, 2023 09:14

## 2023-03-15 NOTE — PHYSICAL THERAPY DAILY NOTE
PT Daily Note-Current


Subjective


Patient in bed pre tx, agrees to PT, has no complaints of pain.





Pain


Section J - Health Conditions


1. Rarely or not at all


2. Occasionally


3. Frequently


4. Almost constantly


8. Unable to answer


Pain Effect on Sleep:  1


Pain Interference with Therapy:  1


Pain Interference w/Day-to-Day:  1





Appearance


Patient in bed post tx with nurse call, phone, tray, all needs met, laying on 

left side with pillow support for pressure relief.





Mental Status


Patient Orientation:  Person, Place, Situation


Attachments:  PEG Tube





Transfers


SCALE: Activities may be completed with or without assistive devices.





6-Indepedent-patient completes the activity by him/herself with no assistance 

from a helper.


5-Set-up or Clean-up Assistance-helper sets up or cleans up; patient completes 

activity. [place] assists only prior to or  


    following the activity.


4-Supervision or Touching Assistance-helper provides verbal cues and/or 

touching/steadying and/or contact guard assistance as patient completes 

activity. Assistance may be provided   


    throughout the activity or intermittently.


3-Partial/Moderate Assistance-helper does LESS THAN HALF the effort. [place] lif

ts, holds or supports trunk or limbs, but provides less than half the effort.


2-Substantial/Maximal Assistance-helper does MORE THAN HALF the effort. [place] 

lifts or holds trunk or limbs and provides more than half the effort.


5-Zfljujant-ggwhjd does ALL the effort. Patient does none of the effort to 

complete the activity. Or, the assistance of 2 or more helpers is required for 

the patient to complete the  


    activity.


If activity was not attempted, code reason:


7-Patient Refused.


9-Not Applicable-not attempted and the patient did not perform the activity 

before the current illness, exacerbation or injury.


10-Not Attempted due to Environmental Limitations-(lack of equipment, weather 

restraints, etc.).


88-Not Attempted due to Medical Conditions or Safety Concerns.


Roll Left & Right (QC):  3


Sit to Lying (QC):  3


Lying to Sitting/Side of Bed(Q:  3


Sit to Stand (QC):  1


Chair/Bed-to-Chair Xfer(QC):  1


Patient sits to the side of the bed with mod assist, she has had a BM, brief is 

slipped onto her legs and a sit to stand machine helps her stand, nurse aide 

wipes and pulls up brief and she is transferred to .  After getting back to 

her room, sit to stand machine is used again to get to bed and mod assist to lay

down and position.





Weight Bearing


Weight Bearing/Tolerated


Weight Bearing/Tolerated





Wheelchair Training


Does the Pt Use a Wheelchair?:  Yes


Wheel 50 ft with 2 turns (QC):  2


Wheel 150 ft (QC):  2


Type of Wheelchair:  Manual


100', 150', patient is only able to propel with her left arm, needs assist 

steering





Exercises


Seated Therapy Exercises:  Ankle pumps, Long arc quads, Hip flexion


Seated Reps:  20 (patient was able to do all of them with her right leg, just 

doesn't have as much AROM of the left leg)


Patient performed sit to stand x4 in the parallel bars with assist of 2, patient

was able to stand almost completely twice.





Treatments


bed mobility and transfers, LE ROM, standing, WC mobility





Assessment


Current Status:  Poor Progress


slight improvement with sit to stand but still needs assist of 2, better AROM of

the RLE





PT Long Term Goals


Long Term Goals


PT Long Term Goals Time Frame:  May 8, 2023


Roll Left & Right (QC):  4


Sit to Lying (QC):  4


Lying-Sitting on Side/Bed(QC):  4


Sit to Stand (QC):  2


Chair/Bed-to-Chair Xfer(QC):  2


Toilet Transfer (QC):  2


Car Transfer (QC):  2


Does the Patient Walk:  No and Walking Goal NOT indicated


Walk 10 feet (QC):  1


Walk 50ft with 2 Turns (QC):  88


Walk 150 ft (QC):  88


Walking 10ft on Uneven Surface:  88


1 Step (curb) (QC):  88


4 Steps (QC):  88


12 Steps (QC):  88


Picking up an Object (QC):  88


Does the Pt use WC or Scooter?:  Yes


Wheel 50 feet with 2 turns (QC:  5 (Patient able to use (L) LE and (B) LE's for 

w/c propulsion)


Type:  Manual


Wheel 150 feet:  5


Type:  Manual





PT Plan


Problem List


Problem List:  Activity Tolerance, Functional Strength, Safety, Balance, Gait, 

Transfer, Bed Mobility, ROM





Treatment/Plan


Treatment Plan:  Continue Plan of Care


Treatment Plan:  Bed Mobility, Concurrent Therapy, Education, Functional 

Activity Russ, Functional Strength, Group Therapy, Safety, Therapeutic 

Exercise, Transfers, Other (W/C mobility)


Treatment Duration:  May 8, 2023


Frequency:  At least 5 of 7 days/Wk (IRF)


Estimated Hrs Per Day:  1.5 hours per day


Patient and/or Family Agrees t:  Yes





Safety Risks/Education


Patient Education:  Transfer Techniques, Correct Positioning, W/C Management, 

Safety Issues


Teaching Recipient:  Patient


Teaching Methods:  Demonstration, Discussion


Response to Teaching:  Reinforcement Needed





Time


Time In:  0930


Time Out:  1030


DATE:  Mar 15, 2023


Total Billed Treatment Time:  60


Total Billed Treatment


1 visit


EX 10'


FA 50'











MING HESS PT                Mar 15, 2023 10:32

## 2023-03-15 NOTE — PM&R PROGRESS NOTE
Subjective


HPI/CC On Admission


Date Seen by Provider:  Mar 15, 2023


Time Seen by Provider:  09:00


Subjective/Events-last exam


3/15/2023:


Improved overall


Family at bedside


Wound vac on hold due to bacteria in the wound culture


Dr Zamorano managing the IV abx 








3/14/2023:


Doing much better


Reviewed back history on her pre-existing debility:


10/5/22 gastric ulcer perforation but used wheelchair periodically prior to that

due to neuropathy


11/4/22 Decubitus ulcer admit stage III wound vac placed


1/29/23 used walker but wheelchair for long distances


Speech will see her today





Review of Systems


General:  Fatigue, Malaise


Neurological:  Weakness, Incoordination





Objective


Exam


Vital Signs





Vital Signs








  Date Time  Temp Pulse Resp B/P (MAP) Pulse Ox O2 Delivery O2 Flow Rate FiO2


 


3/15/23 21:15     97 Room Air  


 


3/15/23 20:00 36.9 94 20 114/58 (76)    


 


3/15/23 09:00       2.00 





Capillary Refill :


General Appearance:  No Apparent Distress, WD/WN, Anxious, Chronically ill


HEENT:  PERRL/EOMI, Normal ENT Inspection, Pharynx Normal


Neck:  Full Range of Motion, Normal Inspection, Non Tender, Supple, Carotid 

Bruit


Respiratory:  Chest Non Tender, Lungs Clear, Normal Breath Sounds, No Accessory 

Muscle Use, No Respiratory Distress


Cardiovascular:  Regular Rate, Rhythm, No Edema, No Gallop, No JVD, No Murmur, 

Normal Peripheral Pulses


Gastrointestinal:  Normal Bowel Sounds, No Organomegaly, No Pulsatile Mass, Non 

Tender, Soft


Back:  Normal Inspection, No CVA Tenderness, No Vertebral Tenderness


Extremity:  Normal Capillary Refill, Normal Inspection, Normal Range of Motion 

(except right side), Non Tender, No Calf Tenderness, No Pedal Edema


Neurologic/Psychiatric:  Alert, Oriented x3, CNs II-XII Norm as Tested, Abnormal

CNs II-XII, Depressed Affect, Facial Droop (right ), Motor Weakness (right sided

weakness)


Skin:  Normal Color, Warm/Dry


Lymphatic:  No Adenopathy





Results/Procedures


Lab


Patient resulted labs reviewed.





FIM


Transfers


Therapy Code Descriptions/Definitions 





Functional [place] Measure:


0=Not Assessed/NA        4=Minimal Assistance


1=Total Assistance        5=Supervision or Setup


2=Maximal Assistance  6=Modified [place]


3=Moderate Assistance 7=Complete IndependenceSCALE: Activities may be completed 

with or without assistive devices.





6-Indepedent-patient completes the activity by him/herself with no assistance 

from a helper.


5-Set-up or Clean-up Assistance-helper sets up or cleans up; patient completes 

activity. [place] assists only prior to or  


    following the activity.


4-Supervision or Touching Assistance-helper provides verbal cues and/or 

touching/steadying and/or contact guard assistance as patient completes 

activity. Assistance may be provided   


    throughout the activity or intermittently.


3-Partial/Moderate Assistance-helper does LESS THAN HALF the effort. [place] 

lifts, holds or supports trunk or limbs, but provides less than half the effort.


2-Substantial/Maximal Assistance-helper does MORE THAN HALF the effort. [place] 

lifts or holds trunk or limbs and provides more than half the effort.


5-Awydnwzfp-zjhudl does ALL the effort. Patient does none of the effort to 

complete the activity. Or, the assistance of 2 or more helpers is required for 

the patient to complete the  


    activity.


If activity was not attempted, code reason:


7-Patient Refused.


9-Not Applicable-not attempted and the patient did not perform the activity 

before the current illness, exacerbation or injury.


10-Not Attempted due to Environmental Limitations-(lack of equipment, weather 

restraints, etc.).


88-Not Attempted due to Medical Conditions or Safety Concerns.


Roll Left to Right (QC):  3


Sit to Lying (QC):  3 (Mod (A) with LE's and assist to bring shoulders to center

of bed afterward.)


Sit to Stand (QC):  1


Chair/Bed-to-Chair Xfer(QC):  1 (Standing lift utilized for recliner to bed 

transfer.  After assisting patient to grasp stander with (R) hand, she required 

assist at (R) elbow to maintain (R) . No dizziness today with standing 

lift.)


Car Transfer (QC):  88





Gait Training


Does the Patient Walk?:  No and Walking Goal NOT indicated


Walk 10 feet (QC):  88


Walk 50 ft with 2 Turns(QC):  88


Walk 150 ft (QC):  88


Walking 10ft/uneven surface-QC:  88





Wheelchair Training


Does the Pt Use a Wheelchair?:  Yes


Wheel 50 ft with 2 turns (QC):  2


Wheel 150 ft (QC):  2


Type of Wheelchair:  Manual





Stair Training


1 Step (curb) (QC):  88


4 Steps (QC):  88


12 Steps (QC):  88





Balance


Picking up an Object (QC):  88





ADL-Treatment


Eating (QC):  88 (PEG tube.)


Oral Hygiene (QC):  4


Shower/Bathe Self (QC):  1 (Lift required to was buttocks.)


Upper Body Dressing (QC):  2


Lower Body Dressing (QC):  1 (lift required for pant hike.)


On/Off Footwear (QC):  2


Toileting Hygiene (QC):  1 (lift required for hygiene and pant hike.)





Assessment/Plan


Assessment and Plan


Assess & Plan/Chief Complaint


Assessment:


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


Infective Endocarditis of Atrial Valve


Dysarthria/expressive aphasia


Dysphagia with PEG tube


Right Knee Effusion 


Sacral Decubitus Ulcer


Anemia


T2DM - ID


Hypothyroidism


Constipation


Hyperlipidemia


GERD


Chronic debility: (10/5/22 gastric ulcer perforation but used wheelchair 

periodically prior to that due to neuropathy, 11/4/22 Decubitus ulcer admit 

stage III wound vac placed, 1/29/23 used walker but wheelchair for long 

distances)





Plan:


Monitor closely


Change Synthroid to PO


ST consult for dysphagia


Utilize PEG


PT OT





3/14/2023:


Monitor closely


Speech therapy to advanced diet if able





3/15/2023:


Monitor closely


Wound care





(1) CVA (cerebral vascular accident)











COOKIE CHRSITIE DO                Mar 15, 2023 08:50

## 2023-03-15 NOTE — ST DYSPHAGIA EVALUATION
Speech Evaluation-General


Medical Diagnosis


CVA, (R) Hemiparesis


Onset Date:  Jan 28, 2023





Therapy Diagnosis


Therapy Diagnosis:  Suspected Mild Oropharyngeal Dysphagia





Precautions


Precautions:  Fall, Pressure Ulcer, Aspiration


Precautions/Isolations:  Contact Isolation, Aspiration, Fall Prevention, 

Pressure Ulcer





Referral


Referring Physician:  Dr. Janie Cook


Reason for Referral:  Evaluation/Treatment





Medical History


Pertinent Medical History:  DM, Hypothroidism, Neuropathy


Reviewed History:  Yes





Social History


Current Living Status:  Spouse





Speech PLF/Current-Dysphagia


Prior Level of Function


The patient stated while she ate slowly prior to her stroke, she was able to 

consume any P.O. consistency without difficulty. 





- Upon transfer from [facility], the patient continued with PEG tube feedings as 

well as P.O. intake of puree consistencies with mildly thick liquids (nectar-

thick). On 2/9/23, 2/21/23, and 2/28/23, the patient was recommended ice chips 

by speech pathology. Speech pathology progress note from 3/10/23 observes the 

patient with pureed consistencies and mildly thick liquids, however, a link 

between the progress on 2/28/23 and the recommendation of the oral diet was not 

found upon chart review by this clinician.





- The patient was documented to have "failed" the clinical bedside swallowing 

evaluation at [facility] and a PEG tube was placed on 2/2/2023. Per 

[facility] documentation, the patient "failed" the clinical bedside swallowing ev

aluation by displaying s/s of suspected aspiration with thin liquids, mildly 

thick liquids, and moderately thick liquids.





Subjective


The patient was seated upright in bed, awake and alert, upon entrance to her 

room by the clinician. The patient greeted the clinician appropriately and was 

agreeable to participation in the clinical bedside swallowing evaluation. The 

patient has her mom and aunt present and bedside, who remain for completion of 

the assessment.





Cognitive Status


Patient Orientation:  Person, Place, Time, Situation





Oral Motor Skills


Dentition:  Natural


Current Food Consistancy:  Pureed (PU4), Nectar Liquids (Mildly thick liquids.)


Ability to Follow Directions:  Good


Oral Expression Ability:  Moderate Impairment





Face


Facial Symmetry:  Asymmetrical (Right facial droop.)





Oral-Facial Assessment


Oral-Facial Dentition:  Normal


Labial Seal Description:  Droops Right


Smile:  Droops Right


Lingual Protrusion:  Abnormal (Protrusion to the right.)


Lingual Strength:  Abnormal (Decreased right lingual strength.)


Volitional Dry Swallow:  Yes


Voluntary Cough:  Yes


Can Clear Throat Volitionally:  Yes


Productive Cough:  Yes


Productive Throat Clear:  Yes





Dysphagia Evaluation


Consistencies Presented:  Regular (Jc cracker.), Thin Liquid (ice chips, 

teaspoons, and straw sips.), Pureed (Applesauce.)


The patient self-fed each consistency.


The clinician encouraged the patient to present consistencies to the left, 

stronger side. While mildly prolonged bolus formation was observed, complete and

timely posterior transfer was present. Oral pocketing was not present with any 

consistency provided, however, the patient did display a spontaneous lingual 

sweep to assess for residual material.


Pharyngeal Phase:  Delayed Swallow


The patient did not display overt s/s of suspected aspiration with any P.O. 

consistency provided. The patient's vocal quality remained consistently clear 

following each swallow. The patient denied odynophagia or the presence of a glob

us sensation.


Dietary Recommendations:  Mechanical Soft (SB6)


Liquid Recommendations:  Thin





Recommendations:


- SB6 with thin liquids, as tolerated.


- Fully upright and alert for P.O. intake.


- Small, single bites and sips. The patient demonstrated this strategy well and 

independently throughout the evaluation.


- Remain upright for 30 minutes following P.O. intake.


- Monitor for s/s of suspected aspiration with P.O. intake. If demonstrated, 

please contact the speech pathologist.


- Completion of a modified barium swallow evaluation to further evaluate the 

patient's oropharyngeal swallowing function.


- As P.O. intake increases, a discussion with the dietician or physician should 

be completed to find the best option to wean alternative sources of nutrition 

(PEG tube).





The results and recommendations were discussed extensively with the patient, the

patient's family members, and the RN immediately following completion. The 

patient is scheduled for a modified barium swallow study on 3/16/23 at 1045.


Dysphagia Evaluation Summary


The patient demonstrated suspected oropharyngeal dysphagia characterized by 

reduced right labial and lingual range of motion and coordination and a 

suspected delay of the pharyngeal swallow. The patient did not display s/s of 

suspected aspiration with any P.O. item provided.





Speech Short Term Goals


Short Term Goals


Short Term Goals


1. The patient will display 90% accuracy with confrontational naming of familiar

objects, independently.





2. The patient will demonstrate safe swallowing precautions with 80% accuracy, 

independently.


Time Frame-STG:  One Week.





Speech Long Term Goals


Long Term Goals


1. The patient will display improved cognitive linguistic skills for safe 

discharge to the least restrictive environment.





1. The patient will tolerate the least restrictive diet consistency without s/s 

of suspected aspiration.


Time Frame:  Three Weeks.





Speech-Plan


Treatment Plan


Speech Therapy Treatment Plan:  Continue Plan of Care


Treatment Duration:  Mar 14, 2023


Frequency:  Modified Program (IRF)


Estimated Hrs Per Day:  1 hour per day


Rehab Potential:  Fair


Pt/Family Agrees to Plan:  Yes





Safety Risks/Education


Teaching Recipient:  Patient, Family


Teaching Methods:  Demonstration, Discussion


Response to Teaching:  Verbalize Understanding, Return Demonstration


Education Topics Provided:  


Results, Recommendations, Plan of Care, Safe Swallowing Precautions, S/s


of Suspected Aspiration





Time


Speech Therapy Time In:  08:30


Speech Therapy Time Out:  09:15


DATE:  Mar 15, 2023


Total Billed Time:  45


Billed Treatment Time


1, JYOTI HYDE ELIZABETH ST               Mar 15, 2023 09:38

## 2023-03-15 NOTE — PROGRESS NOTE
Subjective


Date Seen by a Provider:  Mar 15, 2023


Time Seen by a Provider:  13:30


Subjective/Events-last exam


getting picc. no new issues/complaints. will start vanco for corynobacteria spp.

and MRSA. still recommend wound vac.





Objective


Exam





Vital Signs








  Date Time  Temp Pulse Resp B/P (MAP) Pulse Ox O2 Delivery O2 Flow Rate FiO2


 


3/15/23 09:00     97 Nasal Cannula 2.00 


 


3/15/23 08:00 36.6 91 18 113/64 (80) 91 Room Air  


 


3/14/23 21:00     93 Room Air  


 


3/14/23 20:00 36.9 91 20 107/49 (68) 93 Room Air  














I & O 


 


 3/15/23





 07:00


 


Intake Total 1147 ml


 


Output Total 1200 ml


 


Balance -53 ml





Capillary Refill :


General Appearance:  No Apparent Distress


HEENT:  PERRL/EOMI


Neck:  Full Range of Motion


Respiratory:  Chest Non Tender, Lungs Clear


Cardiovascular:  Regular Rate, Rhythm


Gastrointestinal:  normal bowel sounds, non tender, soft


Extremity:  Normal Capillary Refill


Neurologic/Psychiatric:  Alert, Oriented x3


Skin:  Normal Color, Other (stage 3 decubitus ulcer with good granulation bed. )


Lymphatic:  No Adenopathy





Results


Lab


Laboratory Tests


3/14/23 15:52: Glucometer 144H


3/14/23 20:38: Glucometer 167H


3/15/23 05:49: Glucometer 114H


3/15/23 10:46: Glucometer 141H





Microbiology


3/13/23 Gram Stain - Final, Resulted


          


3/13/23 Wound Culture - Preliminary, Resulted


          Staphylococcus aureus


          Gram Positive Cocci In Chains


          Coryneform bacteria





Assessment/Plan


Assessment/Plan


Assess & Plan/Chief Complaint


hx perforated PUD, left ischmic stroke with sacral decubitus ulcer.


cont wet to dry dressing for now. vac scheduled for tomorrow.


cont therapy.


picc line then start vanco for corynebactrum spp and mrsa..











JANETH HYMAN MD                Mar 15, 2023 13:36

## 2023-03-16 VITALS — SYSTOLIC BLOOD PRESSURE: 111 MMHG | DIASTOLIC BLOOD PRESSURE: 52 MMHG

## 2023-03-16 VITALS — DIASTOLIC BLOOD PRESSURE: 57 MMHG | SYSTOLIC BLOOD PRESSURE: 107 MMHG

## 2023-03-16 RX ADMIN — Medication SCH EACH: at 21:05

## 2023-03-16 RX ADMIN — FOLIC ACID SCH MG: 1 TABLET ORAL at 08:47

## 2023-03-16 RX ADMIN — INSULIN ASPART SCH UNIT: 100 INJECTION, SOLUTION INTRAVENOUS; SUBCUTANEOUS at 15:34

## 2023-03-16 RX ADMIN — INSULIN ASPART SCH UNIT: 100 INJECTION, SOLUTION INTRAVENOUS; SUBCUTANEOUS at 21:17

## 2023-03-16 RX ADMIN — INSULIN ASPART SCH UNIT: 100 INJECTION, SOLUTION INTRAVENOUS; SUBCUTANEOUS at 06:25

## 2023-03-16 RX ADMIN — ENOXAPARIN SODIUM SCH MG: 100 INJECTION SUBCUTANEOUS at 17:03

## 2023-03-16 RX ADMIN — Medication SCH EACH: at 08:46

## 2023-03-16 RX ADMIN — DOCUSATE SODIUM SCH MG: 100 CAPSULE ORAL at 21:16

## 2023-03-16 RX ADMIN — LEVOTHYROXINE SODIUM SCH MCG: 100 TABLET ORAL at 06:39

## 2023-03-16 RX ADMIN — POLYETHYLENE GLYCOL (3350) SCH GM: 17 POWDER, FOR SOLUTION ORAL at 08:50

## 2023-03-16 RX ADMIN — POLYETHYLENE GLYCOL (3350) SCH GM: 17 POWDER, FOR SOLUTION ORAL at 21:16

## 2023-03-16 RX ADMIN — AMITRIPTYLINE HYDROCHLORIDE SCH MG: 25 TABLET, FILM COATED ORAL at 21:06

## 2023-03-16 RX ADMIN — PREGABALIN SCH MG: 75 CAPSULE ORAL at 08:46

## 2023-03-16 RX ADMIN — MELATONIN 3 MG ORAL TABLET SCH MG: 3 TABLET ORAL at 21:06

## 2023-03-16 RX ADMIN — FAMOTIDINE SCH MG: 20 TABLET, FILM COATED ORAL at 21:06

## 2023-03-16 RX ADMIN — SUCRALFATE SCH GM: 1 TABLET ORAL at 11:39

## 2023-03-16 RX ADMIN — DOCUSATE SODIUM SCH MG: 100 CAPSULE ORAL at 08:50

## 2023-03-16 RX ADMIN — VANCOMYCIN SCH MLS/HR: 1.5 INJECTION, SOLUTION INTRAVENOUS at 11:39

## 2023-03-16 RX ADMIN — PREGABALIN SCH MG: 75 CAPSULE ORAL at 21:05

## 2023-03-16 RX ADMIN — Medication SCH MCG: at 06:39

## 2023-03-16 RX ADMIN — SODIUM CHLORIDE SCH MG: 900 INJECTION, SOLUTION INTRAVENOUS at 08:46

## 2023-03-16 RX ADMIN — INSULIN ASPART SCH UNIT: 100 INJECTION, SOLUTION INTRAVENOUS; SUBCUTANEOUS at 11:34

## 2023-03-16 RX ADMIN — DOCUSATE SODIUM AND SENNOSIDES SCH EA: 8.6; 5 TABLET, FILM COATED ORAL at 08:50

## 2023-03-16 RX ADMIN — SUCRALFATE SCH GM: 1 TABLET ORAL at 06:39

## 2023-03-16 RX ADMIN — SUCRALFATE SCH GM: 1 TABLET ORAL at 17:03

## 2023-03-16 RX ADMIN — SODIUM BICARBONATE SCH ML: 84 INJECTION, SOLUTION INTRAVENOUS at 06:51

## 2023-03-16 RX ADMIN — LIDOCAINE SCH EA: 50 PATCH CUTANEOUS at 08:47

## 2023-03-16 RX ADMIN — VANCOMYCIN SCH MLS/HR: 1.5 INJECTION, SOLUTION INTRAVENOUS at 23:21

## 2023-03-16 RX ADMIN — DOCUSATE SODIUM AND SENNOSIDES SCH EA: 8.6; 5 TABLET, FILM COATED ORAL at 21:16

## 2023-03-16 RX ADMIN — FAMOTIDINE SCH MG: 20 TABLET, FILM COATED ORAL at 08:46

## 2023-03-16 RX ADMIN — SUCRALFATE SCH GM: 1 TABLET ORAL at 21:06

## 2023-03-16 NOTE — OCCUPATIONAL THER DAILY NOTE
OT Current Status-Daily Note


Subjective


Pt sleeping in bed, woke slowly to name.  Pt aphasic, but is able to make needs 

known.  Pt agrees to therapy.  [place] with PT [id number], skills of 2 clinicians

requires to decrease fall risk, increase overall mobility and stamina for daily 

functional tasks.  PT focusing on transfers, standing and mobility while OT 

focusing on ADLs, R UE placement during mobility and functional mobility.





Mental Status/Objective


Patient Orientation:  Person, Place, Time, Situation


Attachments:  Drains, [place] Catheter, IV, PEG Tube





ADL-Treatment


Pt able to open some containers/packages then uses regular utensils to eat.  

With education and physical cues, pt able to assist to roll towards L side and 

stay while cleansing after small BM.


Therapy Code Descriptions/Definitions 





Functional [place] Measure:


0=Not Assessed/NA        4=Minimal Assistance


1=Total Assistance        5=Supervision or Setup


2=Maximal Assistance  6=Modified [place]


3=Moderate Assistance 7=Complete IndependenceSCALE: Activities may be completed 

with or without assistive devices.





6-Indepedent-patient completes the activity by him/herself with no assistance 

from a helper.


5-Set-up or Clean-up Assistance-helper sets up or cleans up; patient completes 

activity. [place] assists only prior to or  


    following the activity.


4-Supervision or Touching Assistance-helper provides verbal cues and/or 

touching/steadying and/or contact guard assistance as patient completes 

activity. Assistance may be provided   


    throughout the activity or intermittently.


3-Partial/Moderate Assistance-helper does LESS THAN HALF the effort. [place] 

lifts, holds or supports trunk or limbs, but provides less than half the effort.


2-Substantial/Maximal Assistance-helper does MORE THAN HALF the effort. [place] 

lifts or holds trunk or limbs and provides more than half the effort.


8-Ubsosrsgj-rrtuee does ALL the effort. Patient does none of the effort to 

complete the activity. Or, the assistance of 2 or more helpers is required for 

the patient to complete the  


    activity.


If activity was not attempted, code reason:


7-Patient Refused.


9-Not Applicable-not attempted and the patient did not perform the activity 

before the current illness, exacerbation or injury.


10-Not Attempted due to Environmental Limitations-(lack of equipment, weather 

restraints, etc.).


88-Not Attempted due to Medical Conditions or Safety Concerns.


Eating (QC):  5


On/Off Footwear:  1


Toileting Hygiene (QC):  2





Other Treatment


Increased R UE movement noted throughout.  R hand edema noted, therapy foam will

be given to pt to decrease edema with movement, exercise and education on 

elevation.  Pt complete w/c mobility with assistance.  Pt stood at parallel bars

with max A x2 for ~30-45 sec 3x's.  Pt left in care of PT.  All needs met in 

room.





OT Short Term Goals


Short Term Goals


Time Frame:  Mar 28, 2023


Eating:  3


Toileting hygiene:  3


Shower/bathe self:  3


Lower body dressing:  3


Putting on/taking off footwear:  3





OT Long Term Goals


Long Term Goals


Time Frame:  2023


Acute change in mental status:  1


Inattention:  0


Disorganized thinkin


Altered level of consciousness:  0


Eating (QC):  5


Oral Hygiene (QC):  5


Toileting Hygiene (QC):  4


Shower/Bathe Self (QC):  4


Upper Body Dressing (QC):  5


Lower Body Dressing (QC):  4


On/Off Footwear (QC):  4


Additional Goals:  1-Demonstrate ADL Tasks, 2-Verbalize Understanding, 3-

ImproveStrength/[place]


1=Demonstrate adherence to instructed precautions during ADL tasks.


2=Patient will verbalize/demonstrate understanding of assistive 

devices/modifications for ADL.


3=Patient will improve strength/tolerance for activity to enable patient to 

perform ADL's.





OT Education/Plan


Problem List/Assessment


Assessment:  Decreased Activ Tolerance, Decreased UE Strength, Impaired Bed 

Mobility, Impaired Self-Care Skills, Restricted Funct UE ROM





Discharge Recommendations


Plan/Recommendations:  Continue POC





Treatment Plan/Plan of Care


Patient would benefit from OT for education, treatment and training to promote 

independence in ADL's, mobility, safety and/or upper extremity function for 

ADL's.


Plan of Care:  ADL Retraining, Functional Mobility, Group Exercise/Act as Ind, 

UE Funct Exercise/Act, UE Neuromus Re-Ed/Coord, Visual/Perceptual Retrain, W/C 

Management Training


Treatment Duration:  2023


Frequency:  At least 5 of 7 days/Wk (IRF)


Estimated Hrs Per Day:  1.5 hours per day


Agreement:  Yes


Rehab Potential:  Fair





Time


Start Time:  07:30


Stop Time:  08:30


DATE:  Mar 16, 2023


Total Time Billed (hr/min):  60


Billed Treatment Time


1 visit-ADL 2 (30 min)  FA 2 (30 min)  co-treat with PT [id number], individual 

[id number]











ARTIE MC               Mar 16, 2023 07:42

## 2023-03-16 NOTE — PM&R PROGRESS NOTE
Subjective


HPI/CC On Admission


Date Seen by Provider:  Mar 16, 2023


Time Seen by Provider:  12:00


Subjective/Events-last exam


3/16/2023:


Improved dramatically


Stood for 35 seconds with parallel bars


No pain reported except wound


Family at bedside








3/15/2023:


Improved overall


Family at bedside


Wound vac on hold due to bacteria in the wound culture


Dr Zamorano managing the IV abx 








3/14/2023:


Doing much better


Reviewed back history on her pre-existing debility:


10/5/22 gastric ulcer perforation but used wheelchair periodically prior to that

due to neuropathy


11/4/22 Decubitus ulcer admit stage III wound vac placed


1/29/23 used walker but wheelchair for long distances


Speech will see her today





Review of Systems


General:  Fatigue, Malaise


Neurological:  Incoordination





Objective


Exam


Vital Signs





Vital Signs








  Date Time  Temp Pulse Resp B/P (MAP) Pulse Ox O2 Delivery O2 Flow Rate FiO2


 


3/16/23 20:09 36.4 89 20 107/57 (74) 92 Room Air  


 


3/15/23 09:00       2.00 





Capillary Refill :


General Appearance:  No Apparent Distress, WD/WN, Anxious, Chronically ill


HEENT:  PERRL/EOMI, Normal ENT Inspection, Pharynx Normal


Neck:  Full Range of Motion, Normal Inspection, Non Tender, Supple, Carotid 

Bruit


Respiratory:  Chest Non Tender, Lungs Clear, Normal Breath Sounds, No Accessory 

Muscle Use, No Respiratory Distress


Cardiovascular:  Regular Rate, Rhythm, No Edema, No Gallop, No JVD, No Murmur, 

Normal Peripheral Pulses


Gastrointestinal:  Normal Bowel Sounds, No Organomegaly, No Pulsatile Mass, Non 

Tender, Soft


Back:  Normal Inspection, No CVA Tenderness, No Vertebral Tenderness


Extremity:  Normal Capillary Refill, Normal Inspection, Normal Range of Motion 

(except right side), Non Tender, No Calf Tenderness, No Pedal Edema


Neurologic/Psychiatric:  Alert, Oriented x3, CNs II-XII Norm as Tested, Abnormal

CNs II-XII, Depressed Affect, Facial Droop (right ), Motor Weakness (right sided

weakness)


Skin:  Normal Color, Warm/Dry


Lymphatic:  No Adenopathy





Results/Procedures


Lab


Patient resulted labs reviewed.





FIM


Transfers


Therapy Code Descriptions/Definitions 





Functional [place] Measure:


0=Not Assessed/NA        4=Minimal Assistance


1=Total Assistance        5=Supervision or Setup


2=Maximal Assistance  6=Modified [place]


3=Moderate Assistance 7=Complete IndependenceSCALE: Activities may be completed 

with or without assistive devices.





6-Indepedent-patient completes the activity by him/herself with no assistance 

from a helper.


5-Set-up or Clean-up Assistance-helper sets up or cleans up; patient completes 

activity. [place] assists only prior to or  


    following the activity.


4-Supervision or Touching Assistance-helper provides verbal cues and/or 

touching/steadying and/or contact guard assistance as patient completes activi

ty. Assistance may be provided   


    throughout the activity or intermittently.


3-Partial/Moderate Assistance-helper does LESS THAN HALF the effort. [place] 

lifts, holds or supports trunk or limbs, but provides less than half the effort.


2-Substantial/Maximal Assistance-helper does MORE THAN HALF the effort. [place] 

lifts or holds trunk or limbs and provides more than half the effort.


0-Ymwbjxybc-rvzbid does ALL the effort. Patient does none of the effort to 

complete the activity. Or, the assistance of 2 or more helpers is required for 

the patient to complete the  


    activity.


If activity was not attempted, code reason:


7-Patient Refused.


9-Not Applicable-not attempted and the patient did not perform the activity 

before the current illness, exacerbation or injury.


10-Not Attempted due to Environmental Limitations-(lack of equipment, weather 

restraints, etc.).


88-Not Attempted due to Medical Conditions or Safety Concerns.


Roll Left to Right (QC):  3


Sit to Lying (QC):  3


Sit to Stand (QC):  1


Chair/Bed-to-Chair Xfer(QC):  1


Car Transfer (QC):  88





Gait Training


Does the Patient Walk?:  No and Walking Goal NOT indicated


Walk 10 feet (QC):  88


Walk 50 ft with 2 Turns(QC):  88


Walk 150 ft (QC):  88


Walking 10ft/uneven surface-QC:  88





Wheelchair Training


Does the Pt Use a Wheelchair?:  Yes


Wheel 50 ft with 2 turns (QC):  2


Wheel 150 ft (QC):  2


Type of Wheelchair:  Manual





Stair Training


1 Step (curb) (QC):  88


4 Steps (QC):  88


12 Steps (QC):  88





Balance


Picking up an Object (QC):  88





ADL-Treatment


Eating (QC):  5


Oral Hygiene (QC):  6


Shower/Bathe Self (QC):  3 (Mod A)


Upper Body Dressing (QC):  2


Lower Body Dressing (QC):  1


On/Off Footwear (QC):  2


Toileting Hygiene (QC):  1


Toilet Transfer (QC):  1





Assessment/Plan


Assessment and Plan


Assess & Plan/Chief Complaint


Assessment:


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


Infective Endocarditis of Atrial Valve


Dysarthria/expressive aphasia


Dysphagia with PEG tube


Right Knee Effusion 


Sacral Decubitus Ulcer


Anemia-iron def so gave one dose of Venofer


T2DM - ID


Hypothyroidism


Constipation


Hyperlipidemia


GERD


Chronic debility: (10/5/22 gastric ulcer perforation but used wheelchair 

periodically prior to that due to neuropathy, 11/4/22 Decubitus ulcer admit 

stage III wound vac placed, 1/29/23 used walker but wheelchair for long 

distances)





Plan:


Monitor closely


Change Synthroid to PO


ST consult for dysphagia


Utilize PEG


PT OT





3/14/2023:


Monitor closely


Speech therapy to advanced diet if able





3/15/2023:


Monitor closely


Wound care





3/16/2023:


Dramatically improved


Wound care


IV abx





(1) CVA (cerebral vascular accident)











COOKIE CHRISTIE DO                Mar 16, 2023 05:07

## 2023-03-16 NOTE — ST MOD BARIUM SWALLOW
Speech Evaluation-General


Medical Diagnosis


CVA, (R) Hemiparesis


Onset Date:  Jan 28, 2023





Therapy Diagnosis


Therapy Diagnosis:  Mild Oropharyngeal Dysphagia





Precautions


Precautions:  Fall, Pressure Ulcer, Aspiration


Precautions/Isolations:  Contact Isolation, Aspiration, Fall Prevention, 

Pressure Ulcer





Referral


Referring Physician:  Dr. Janie Cook


Reason for Referral:  Evaluation/Treatment





Medical History


Pertinent Medical History:  DM, Hypothroidism, Neuropathy


Reviewed History:  Yes





Social History


Current Living Status:  Spouse





Speech Mod Barium Swallow


Prior Level of Function


Please see the patient's clinical bedside swallowing evaluation completed on 

3/15/23 for full report and details. Prior to the modified barium swallow 

evaluation, the patient was receiving a SB6 diet consistency with thin liquids.





Oral Motor Skills


Lingual Protrusion:  Abnormal (Right Sided Lingual Protrusion)


Lingual Protrusion Abnormal:  R


Lingual ROM:  Abnormal (Decreased right sided range of motion.)


Lingual Strength:  Abnormal (Decreased right sided range of motion.)


Volitional Dry Swallow:  Yes


Voluntary Cough:  Yes


Can Clear Throat Volitionally:  Yes





Textures-Lateral View


Lateral View Food Presentation:  Thin Liquid via Straw, Pureed Solids, Regular 

Solids





Oral Phase


Labial Closure:  No Impairment (WFL)


Bolus Formation Pooling L/R:  No Impairment (WFL)


Bolus Formation Placement:  No Impairment (WFL)


Mastication Rotary Chew:  No Impairment (WFL)


A/P Lingual Propulsion:  Mild Impairment


Lingual Movement:  Mild Impairment


Oral Phase Residue:  Minimal Impairment (Trace.)


The patient demonstrated a mildly impaired oral phase of the swallow function. 

The patient was able to draw material appropriately from the teaspoon and straw 

(self-feed each consistency). Anterior bolus loss was not appreciated. The 

patient displayed mild difficulty with bolus formation and transfer of the solid

consistency which appeared secondary to reduced lingual coordination and 

strength. Mildly prolonged mastication of the solid consistency was observed. No

premature spillage was present throughout the study.





Pharyngeal Phase


Swallow Response:  Minimal Impairment


Base of Tongue:  No Impairment (WFL)


Epiglottic Movement:  Mild Impairment


Laryngeal Elevation:  No Impairment (WFL)


Pharyngeal Wall Residue:  Mild


Piriform Sinus Residue:  Mild


Laryngeal Penetration:  Mild


Aspiration Observations:  None


The patient demonstrated a mild pharyngeal impairment to the swallow function. A

minimal delay of the pharyngeal swallow was visualized, with bolus material 

reaching the laryngeal surface of the epiglottis prior to swallow initiation. 

Complete hyo-laryngeal excursion and laryngeal elevation were present with 

complete, yet delayed, epiglottic inversion. The delayed onset of the pharyngeal

swallow resulted in delayed epiglottic inversion allowing for minimal trace, 

transient (flash) laryngeal penetration during the swallow with thin liquids. A 

chin tuck eliminated penetration with thin liquids. No aspiration occurred with 

any consistency tested. Intact base of tongue and pharyngeal contractions were 

present. Minimal residual material remained coating the pharyngeal structures 

following the swallow. Completion clearance of bolus material through the 

cricopharyngeal region was visualized.





Summary/Impressions


The patient demonstrated mild oropharyngeal dysphagia characterized by mildly 

reduced lingual coordination and strength and a mildly delayed onset of the 

pharyngeal swallow. Trace, transient penetration occurred with thin liquids 

during the swallow. A chin tuck was efficient at eliminating penetration with 

thin liquids. No aspiration occurred with any consistency tested.





Recommendations:


- SB6 consistency diet with thin liquids, as tolerated.


- Chin tuck utilized with thin liquids for improved airway protection.


- Fully upright and alert for P.O. intake.


- Meal set-up assistance, as needed.


- Small, single bites and sips.


- Crush medication and place in puree for administration.


- Monitor for s/s of suspected aspiration with P.O. intake. If demonstrated, 

please contact speech pathology.


- Speech pathology to continue daily dysphagia therapy and diet tolerance 

assessment.





The results and recommendations were discussed with the patient, the patient's 

family, and the RN. The RN discussed the recommendations with the dietician 

following the study. At this time, the patient denied questions or concerns for 

the clinician.





Speech Short Term Goals


Short Term Goals


Short Term Goals


1. The patient will display 90% accuracy with confrontational naming of familiar

objects, independently.





2. The patient will demonstrate safe swallowing precautions with 80% accuracy, 

independently.


Time Frame-STG:  One Week.





Speech Long Term Goals


Long Term Goals


1. The patient will display improved cognitive linguistic skills for safe 

discharge to the least restrictive environment.





1. The patient will tolerate the least restrictive diet consistency without s/s 

of suspected aspiration.


Time Frame:  Three Weeks.





Speech-Plan


Treatment Plan


Speech Therapy Treatment Plan:  Continue Plan of Care


Treatment Duration:  Apr 4, 2023


Frequency:  Modified Program (IRF)


Estimated Hrs Per Day:  1 hour per day


Rehab Potential:  Fair


Pt/Family Agrees to Plan:  Yes





Safety Risks/Education


Teaching Recipient:  Patient, Family


Teaching Methods:  Demonstration, Discussion, Audiovisual


Response to Teaching:  Verbalize Understanding, Return Demonstration


Education Topics Provided:  


Results, Recommendations, Safe Swallowing Precautions





Time


Speech Therapy Time In:  10:45


Speech Therapy Time Out:  11:45


DATE:  Mar 16, 2023


Total Billed Time:  60


Billed Treatment Time


1, MOD, LIUDMILA MORTON               Mar 16, 2023 11:28

## 2023-03-16 NOTE — DIAGNOSTIC IMAGING REPORT
Indication: Dysphasia.



Procedure was performed in conjunction with speech pathology.

Video fluoroscopy was performed during swallowing of barium with

multiple consistencies. 58 seconds of fluoroscopic time was

utilized.



Patient ingested thin barium as well as applesauce and cracker

consistency. There were 2 episodes of flash penetration during

swallowing of thin barium however this did resolve with a chin

tuck. All other consistencies were unremarkable. There is normal

epiglottic tilt and laryngeal elevation. No other significant

residue is seen.



IMPRESSION: Essentially unremarkable modified barium swallow.

There are 2 episodes of flash penetration with thin barium. No

aspiration was observed.



Dictated by: 



  Dictated on workstation # [id number]

## 2023-03-17 VITALS — SYSTOLIC BLOOD PRESSURE: 110 MMHG | DIASTOLIC BLOOD PRESSURE: 63 MMHG

## 2023-03-17 VITALS — DIASTOLIC BLOOD PRESSURE: 58 MMHG | SYSTOLIC BLOOD PRESSURE: 96 MMHG

## 2023-03-17 LAB
AMORPH SED URNS QL MICRO: (no result) /LPF
APTT PPP: YELLOW S
BACTERIA #/AREA URNS HPF: (no result) /HPF
BILIRUB UR QL STRIP: NEGATIVE
FIBRINOGEN PPP-MCNC: CLEAR MG/DL
GLUCOSE UR STRIP-MCNC: NEGATIVE MG/DL
KETONES UR QL STRIP: NEGATIVE
LEUKOCYTE ESTERASE UR QL STRIP: NEGATIVE
NITRITE UR QL STRIP: NEGATIVE
PH UR STRIP: 5.5 [PH] (ref 5–9)
PROT UR QL STRIP: NEGATIVE
RBC #/AREA URNS HPF: (no result) /HPF
SP GR UR STRIP: <=1.005 (ref 1.02–1.02)
WBC #/AREA URNS HPF: (no result) /HPF

## 2023-03-17 RX ADMIN — NYSTATIN SCH GM: 100000 CREAM TOPICAL at 20:54

## 2023-03-17 RX ADMIN — FAMOTIDINE SCH MG: 20 TABLET, FILM COATED ORAL at 20:37

## 2023-03-17 RX ADMIN — LEVOTHYROXINE SODIUM SCH MCG: 100 TABLET ORAL at 06:24

## 2023-03-17 RX ADMIN — SUCRALFATE SCH GM: 1 TABLET ORAL at 06:24

## 2023-03-17 RX ADMIN — NYSTATIN SCH GM: 100000 CREAM TOPICAL at 13:42

## 2023-03-17 RX ADMIN — BETHANECHOL CHLORIDE SCH MG: 25 TABLET ORAL at 20:37

## 2023-03-17 RX ADMIN — POLYETHYLENE GLYCOL (3350) SCH GM: 17 POWDER, FOR SOLUTION ORAL at 20:40

## 2023-03-17 RX ADMIN — SUCRALFATE SCH GM: 1 TABLET ORAL at 11:20

## 2023-03-17 RX ADMIN — SUCRALFATE SCH GM: 1 TABLET ORAL at 16:20

## 2023-03-17 RX ADMIN — DOCUSATE SODIUM SCH MG: 100 CAPSULE ORAL at 20:40

## 2023-03-17 RX ADMIN — DOCUSATE SODIUM SCH MG: 100 CAPSULE ORAL at 09:41

## 2023-03-17 RX ADMIN — DOCUSATE SODIUM AND SENNOSIDES SCH EA: 8.6; 5 TABLET, FILM COATED ORAL at 20:40

## 2023-03-17 RX ADMIN — MICONAZOLE NITRATE SCH GM: 20 POWDER TOPICAL at 20:56

## 2023-03-17 RX ADMIN — INSULIN ASPART SCH UNIT: 100 INJECTION, SOLUTION INTRAVENOUS; SUBCUTANEOUS at 15:44

## 2023-03-17 RX ADMIN — BETHANECHOL CHLORIDE SCH MG: 25 TABLET ORAL at 11:20

## 2023-03-17 RX ADMIN — FAMOTIDINE SCH MG: 20 TABLET, FILM COATED ORAL at 09:33

## 2023-03-17 RX ADMIN — INSULIN ASPART SCH UNIT: 100 INJECTION, SOLUTION INTRAVENOUS; SUBCUTANEOUS at 11:14

## 2023-03-17 RX ADMIN — BETHANECHOL CHLORIDE SCH MG: 25 TABLET ORAL at 06:23

## 2023-03-17 RX ADMIN — SODIUM BICARBONATE SCH ML: 84 INJECTION, SOLUTION INTRAVENOUS at 06:24

## 2023-03-17 RX ADMIN — SUCRALFATE SCH GM: 1 TABLET ORAL at 20:37

## 2023-03-17 RX ADMIN — Medication SCH MCG: at 06:23

## 2023-03-17 RX ADMIN — MELATONIN 3 MG ORAL TABLET SCH MG: 3 TABLET ORAL at 20:39

## 2023-03-17 RX ADMIN — AMITRIPTYLINE HYDROCHLORIDE SCH MG: 25 TABLET, FILM COATED ORAL at 20:37

## 2023-03-17 RX ADMIN — BETHANECHOL CHLORIDE SCH MG: 25 TABLET ORAL at 16:20

## 2023-03-17 RX ADMIN — Medication SCH EACH: at 20:37

## 2023-03-17 RX ADMIN — INSULIN ASPART SCH UNIT: 100 INJECTION, SOLUTION INTRAVENOUS; SUBCUTANEOUS at 20:44

## 2023-03-17 RX ADMIN — Medication SCH EACH: at 09:33

## 2023-03-17 RX ADMIN — DOCUSATE SODIUM AND SENNOSIDES SCH EA: 8.6; 5 TABLET, FILM COATED ORAL at 09:33

## 2023-03-17 RX ADMIN — PREGABALIN SCH MG: 75 CAPSULE ORAL at 20:37

## 2023-03-17 RX ADMIN — PREGABALIN SCH MG: 75 CAPSULE ORAL at 09:33

## 2023-03-17 RX ADMIN — POLYETHYLENE GLYCOL (3350) SCH GM: 17 POWDER, FOR SOLUTION ORAL at 09:42

## 2023-03-17 RX ADMIN — ENOXAPARIN SODIUM SCH MG: 100 INJECTION SUBCUTANEOUS at 16:21

## 2023-03-17 RX ADMIN — LIDOCAINE SCH EA: 50 PATCH CUTANEOUS at 09:33

## 2023-03-17 RX ADMIN — VANCOMYCIN SCH MLS/HR: 1.5 INJECTION, SOLUTION INTRAVENOUS at 20:41

## 2023-03-17 RX ADMIN — INSULIN ASPART SCH UNIT: 100 INJECTION, SOLUTION INTRAVENOUS; SUBCUTANEOUS at 06:10

## 2023-03-17 RX ADMIN — FOLIC ACID SCH MG: 1 TABLET ORAL at 09:33

## 2023-03-17 NOTE — PHYSICAL THERAPY DAILY NOTE
PT Daily Note-Current


Subjective


Patient in bed pre tx, agrees to PT, has no complaints of pain at rest.  Wound 

nurse is in room and would like help with rolling and positioning.





Pain


Section J - Health Conditions


1. Rarely or not at all


2. Occasionally


3. Frequently


4. Almost constantly


8. Unable to answer


Pain Effect on Sleep:  1


Pain Interference with Therapy:  1


Pain Interference w/Day-to-Day:  1





Appearance


Patient in bed post tx with nurse call, phone, tray, all needs met.





Mental Status


Patient Orientation:  Person, Place, Situation


Attachments:  [place] Catheter


wound vac





Transfers


SCALE: Activities may be completed with or without assistive devices.





6-Indepedent-patient completes the activity by him/herself with no assistance 

from a helper.


5-Set-up or Clean-up Assistance-helper sets up or cleans up; patient completes 

activity. [place] assists only prior to or  


    following the activity.


4-Supervision or Touching Assistance-helper provides verbal cues and/or 

touching/steadying and/or contact guard assistance as patient completes a

ctivity. Assistance may be provided   


    throughout the activity or intermittently.


3-Partial/Moderate Assistance-helper does LESS THAN HALF the effort. [place] 

lifts, holds or supports trunk or limbs, but provides less than half the effort.


2-Substantial/Maximal Assistance-helper does MORE THAN HALF the effort. [place] 

lifts or holds trunk or limbs and provides more than half the effort.


8-Vxuwcvqst-nhcvfq does ALL the effort. Patient does none of the effort to 

complete the activity. Or, the assistance of 2 or more helpers is required for 

the patient to complete the  


    activity.


If activity was not attempted, code reason:


7-Patient Refused.


9-Not Applicable-not attempted and the patient did not perform the activity 

before the current illness, exacerbation or injury.


10-Not Attempted due to Environmental Limitations-(lack of equipment, weather 

restraints, etc.).


88-Not Attempted due to Medical Conditions or Safety Concerns.


Roll Left & Right (QC):  3


Patient rolls to the left side with mod assist and to the right side with min 

assist.  Patient had to roll from side to side and maintain position while wound

vac was applied.





Weight Bearing


Weight Bearing/Tolerated


Weight Bearing/Tolerated





Treatments


rolling, positioning





Assessment


Current Status:  Fair Progress


improved rolling





PT Long Term Goals


Long Term Goals


PT Long Term Goals Time Frame:  May 8, 2023


Roll Left & Right (QC):  4


Sit to Lying (QC):  4


Lying-Sitting on Side/Bed(QC):  4


Sit to Stand (QC):  2


Chair/Bed-to-Chair Xfer(QC):  2


Toilet Transfer (QC):  2


Car Transfer (QC):  2


Does the Patient Walk:  No and Walking Goal NOT indicated


Walk 10 feet (QC):  1


Walk 50ft with 2 Turns (QC):  88


Walk 150 ft (QC):  88


Walking 10ft on Uneven Surface:  88


1 Step (curb) (QC):  88


4 Steps (QC):  88


12 Steps (QC):  88


Picking up an Object (QC):  88


Does the Pt use WC or Scooter?:  Yes


Wheel 50 feet with 2 turns (QC:  5 (Patient able to use (L) LE and (B) LE's for 

w/c propulsion)


Type:  Manual


Wheel 150 feet:  5


Type:  Manual





PT Plan


Problem List


Problem List:  Activity Tolerance, Functional Strength, Safety, Balance, Gait, 

Transfer, Bed Mobility, ROM





Treatment/Plan


Treatment Plan:  Continue Plan of Care


Treatment Plan:  Bed Mobility, Concurrent Therapy, Education, Functional 

Activity Russ, Functional Strength, Group Therapy, Safety, Therapeutic 

Exercise, Transfers, Other (W/C mobility)


Treatment Duration:  May 8, 2023


Frequency:  At least 5 of 7 days/Wk (IRF)


Estimated Hrs Per Day:  1.5 hours per day


Patient and/or Family Agrees t:  Yes





Safety Risks/Education


Patient Education:  Correct Positioning, Safety Issues


Teaching Recipient:  Patient


Teaching Methods:  Demonstration, Discussion


Response to Teaching:  Reinforcement Needed


Patient positioned on the left side post tx with pillow support for pressure 

relief.





Time


Time In:  1000


Time Out:  1030


DATE:  Mar 17, 2023


Total Billed Treatment Time:  30


Total Billed Treatment


1 visit


FA 30'











MING HESS PT                Mar 17, 2023 11:53

## 2023-03-17 NOTE — OCCUPATIONAL THER DAILY NOTE
OT Current Status-Daily Note


Subjective


Pt alert, lying in bed.  Pt's  in room.  Pt agrees to therapy.  Nrsg and 

 reports pt has had a difficult night with pain and LE cramping.  Co-

treat with PT [id number], skills of 2 clinicians required to decrease fall risk, 

increase strength and stamina to complete standing and functional tasks.  PT 

focusing on transfers, standing and B LE strengthening while OT focusing on 

ADLs, functional mobility and managing R UE during all tasks/mobility.





Mental Status/Objective


Patient Orientation:  Person, Place, Non-Verbal/Aphasic, Time, Situation


Attachments:  Drains (wound vac), IV





ADL-Treatment


Pt agrees to shower.  Pt is able to open containers/packages with increased time

and uses regular utensils to eat.   encourages pt to eat meal.  Max A for

supine <--> EOB, sitting EOB independent.  Sit to stand lift for all transfers. 

Pt sat 100% of the time to complete shower on rolling shower chair using 

grabbars and hand held shower to bathe all areas except lower legs/feet and but

tocks.  Min A to thread R UE into sleeve then pt completed rest of UBD sequence.

 Dependent for lower body and footwear.


Therapy Code Descriptions/Definitions 





Functional [place] Measure:


0=Not Assessed/NA        4=Minimal Assistance


1=Total Assistance        5=Supervision or Setup


2=Maximal Assistance  6=Modified [place]


3=Moderate Assistance 7=Complete IndependenceSCALE: Activities may be completed 

with or without assistive devices.





6-Indepedent-patient completes the activity by him/herself with no assistance 

from a helper.


5-Set-up or Clean-up Assistance-helper sets up or cleans up; patient completes 

activity. [place] assists only prior to or  


    following the activity.


4-Supervision or Touching Assistance-helper provides verbal cues and/or 

touching/steadying and/or contact guard assistance as patient completes 

activity. Assistance may be provided   


    throughout the activity or intermittently.


3-Partial/Moderate Assistance-helper does LESS THAN HALF the effort. [place] 

lifts, holds or supports trunk or limbs, but provides less than half the effort.


2-Substantial/Maximal Assistance-helper does MORE THAN HALF the effort. [place] 

lifts or holds trunk or limbs and provides more than half the effort.


2-Iqfbhjdbr-ezdwzr does ALL the effort. Patient does none of the effort to 

complete the activity. Or, the assistance of 2 or more helpers is required for 

the patient to complete the  


    activity.


If activity was not attempted, code reason:


7-Patient Refused.


9-Not Applicable-not attempted and the patient did not perform the activity 

before the current illness, exacerbation or injury.


10-Not Attempted due to Environmental Limitations-(lack of equipment, weather 

restraints, etc.).


88-Not Attempted due to Medical Conditions or Safety Concerns.


Eating (QC):  5


Shower/Bathe Self (QC):  3


Upper Body Dressing (QC):  3 (mod A)


Lower Body Dressing (QC):  1


On/Off Footwear:  1


Toileting Hygiene (QC):  1


Toilet Transfer (QC):  1





Other Treatment


Pt propelled w/c with mod to max A while utilizing L UE/LE.  Pt stood with max A

x2 to  parallel bars 3x's ~30-40 seconds each.  After session, pt lying 

in bed with call light/phone in reach.  All needs met in room.





OT Short Term Goals


Short Term Goals


Time Frame:  Mar 28, 2023


Eating:  3


Toileting hygiene:  3


Shower/bathe self:  3


Lower body dressing:  3


Putting on/taking off footwear:  3





OT Long Term Goals


Long Term Goals


Time Frame:  2023


Acute change in mental status:  1


Inattention:  0


Disorganized thinkin


Altered level of consciousness:  0


Eating (QC):  5


Oral Hygiene (QC):  5


Toileting Hygiene (QC):  4


Shower/Bathe Self (QC):  4


Upper Body Dressing (QC):  5


Lower Body Dressing (QC):  4


On/Off Footwear (QC):  4


Additional Goals:  1-Demonstrate ADL Tasks, 2-Verbalize Understanding, 3-

ImproveStrength/Russ


1=Demonstrate adherence to instructed precautions during ADL tasks.


2=Patient will verbalize/demonstrate understanding of assistive 

devices/modifications for ADL.


3=Patient will improve strength/tolerance for activity to enable patient to 

perform ADL's.





OT Education/Plan


Problem List/Assessment


Assessment:  Decreased Activ Tolerance, Decreased UE Strength, Dependent 

Transfers, Impaired Bed Mobility, Impaired Funct Balance, Impaired Self-Care 

Skills, Restricted Funct UE ROM





Discharge Recommendations


Plan/Recommendations:  Continue POC





Treatment Plan/Plan of Care


Patient would benefit from OT for education, treatment and training to promote 

independence in ADL's, mobility, safety and/or upper extremity function for 

ADL's.


Plan of Care:  ADL Retraining, Functional Mobility, Group Exercise/Act as Ind, 

UE Funct Exercise/Act, UE Neuromus Re-Ed/Coord, Visual/Perceptual Retrain, W/C 

Management Training


Treatment Duration:  2023


Frequency:  At least 5 of 7 days/Wk (IRF)


Estimated Hrs Per Day:  1.5 hours per day


Agreement:  Yes


Rehab Potential:  Fair





Time


Start Time:  07:15


Stop Time:  09:00


DATE:  Mar 17, 2023


Total Time Billed (hr/min):  105


Billed Treatment Time


1 visit-ADL 4 (60 min)  FA 3 (45 min)  co-treat with PT [id number], individual 

[id number]











ARTIE MC               Mar 17, 2023 07:34

## 2023-03-17 NOTE — PHYSICAL THERAPY DAILY NOTE
PT Daily Note-Current


Subjective


This note is documented late, the original was accidently put on the wrong 

patient.  This note is for 3/16/23.  Patient in bed pre tx, agrees to PT, has no

complaints of pain.  Will be co-treating with OT due to poor patient mobility, 

strength, endurance, severe debility, coordinate UE and LE during activity, 

safety and reduce risk of falls.





Pain


Section J - Health Conditions


1. Rarely or not at all


2. Occasionally


3. Frequently


4. Almost constantly


8. Unable to answer


Pain Effect on Sleep:  1


Pain Interference with Therapy:  1


Pain Interference w/Day-to-Day:  1





Appearance


Patient in bed post tx with nurse call, phone, tray, all needs met





Mental Status


Patient Orientation:  Person, Place, Situation


Attachments:  PEG Tube, Clayton Catheter





Transfers


SCALE: Activities may be completed with or without assistive devices.





6-Indepedent-patient completes the activity by him/herself with no assistance 

from a helper.


5-Set-up or Clean-up Assistance-helper sets up or cleans up; patient completes 

activity. [place] assists only prior to or  


    following the activity.


4-Supervision or Touching Assistance-helper provides verbal cues and/or touc

jagruti/steadying and/or contact guard assistance as patient completes activity. 

Assistance may be provided   


    throughout the activity or intermittently.


3-Partial/Moderate Assistance-helper does LESS THAN HALF the effort. [place] 

lifts, holds or supports trunk or limbs, but provides less than half the effort.


2-Substantial/Maximal Assistance-helper does MORE THAN HALF the effort. [place] 

lifts or holds trunk or limbs and provides more than half the effort.


8-Juuhvwvom-suplyr does ALL the effort. Patient does none of the effort to 

complete the activity. Or, the assistance of 2 or more helpers is required for 

the patient to complete the  


    activity.


If activity was not attempted, code reason:


7-Patient Refused.


9-Not Applicable-not attempted and the patient did not perform the activity 

before the current illness, exacerbation or injury.


10-Not Attempted due to Environmental Limitations-(lack of equipment, weather 

restraints, etc.).


88-Not Attempted due to Medical Conditions or Safety Concerns.


Roll Left & Right (QC):  3


Sit to Lying (QC):  3


Lying to Sitting/Side of Bed(Q:  3


Sit to Stand (QC):  1


Chair/Bed-to-Chair Xfer(QC):  1


Patient sits to the side of the bed with mod assist, OT dons shoes and she is 

transferred to  via sit to stand machine.  After getting back to her room at 

the end of tx, patient is again transferred via sit to stand machine to bed, she

is cleaned from a little BM by therapy tech and gown changed after sitting, and 

lays back down with mod assist, patient is placed on left side with pillow 

support for pressure relief.





Weight Bearing


Weight Bearing/Tolerated


Weight Bearing/Tolerated





Wheelchair Training


Wheel 50 ft with 2 turns (QC):  2


Type of Wheelchair:  Manual


100'x2, patient propels with left arm and attempts to propel with feet but WC is

a little too tall for this.





Exercises


standing in the parallel bars x3 for about 30 sec each time, assist of 2





Treatments


PT performed bed mobility and transfers, standing, WC mobility, OT performed 

dressing, UE positioning and safety during activity, assisted with standing.





Assessment


Current Status:  Poor Progress


slightly better sit to stand, patient was able to stand completely but with 

assist of 2





PT Long Term Goals


Long Term Goals


PT Long Term Goals Time Frame:  May 8, 2023


Roll Left & Right (QC):  4


Sit to Lying (QC):  4


Lying-Sitting on Side/Bed(QC):  4


Sit to Stand (QC):  2


Chair/Bed-to-Chair Xfer(QC):  2


Toilet Transfer (QC):  2


Car Transfer (QC):  2


Does the Patient Walk:  No and Walking Goal NOT indicated


Walk 10 feet (QC):  1


Walk 50ft with 2 Turns (QC):  88


Walk 150 ft (QC):  88


Walking 10ft on Uneven Surface:  88


1 Step (curb) (QC):  88


4 Steps (QC):  88


12 Steps (QC):  88


Picking up an Object (QC):  88


Does the Pt use WC or Scooter?:  Yes


Wheel 50 feet with 2 turns (QC:  5 (Patient able to use (L) LE and (B) LE's for 

w/c propulsion)


Type:  Manual


Wheel 150 feet:  5


Type:  Manual





PT Plan


Problem List


Problem List:  Activity Tolerance, Functional Strength, Safety, Balance, Gait, 

Transfer, Bed Mobility, ROM





Treatment/Plan


Treatment Plan:  Continue Plan of Care


Treatment Plan:  Bed Mobility, Concurrent Therapy, Education, Functional 

Activity Russ, Functional Strength, Group Therapy, Safety, Therapeutic 

Exercise, Transfers, Other (W/C mobility)


Treatment Duration:  May 8, 2023


Frequency:  At least 5 of 7 days/Wk (IRF)


Estimated Hrs Per Day:  1.5 hours per day


Patient and/or Family Agrees t:  Yes





Safety Risks/Education


Patient Education:  Transfer Techniques, Correct Positioning, W/C Management, 

Safety Issues


Teaching Recipient:  Patient


Teaching Methods:  Demonstration, Discussion


Response to Teaching:  Reinforcement Needed





Time


Time In:  0800


Time Out:  0900


DATE:  Mar 16, 2023


Total Billed Treatment Time:  60


Total Billed Treatment


1 visit


FA 60'











MING HESS PT                Mar 17, 2023 13:36

## 2023-03-17 NOTE — PM&R PROGRESS NOTE
Subjective


HPI/CC On Admission


Date Seen by Provider:  Mar 17, 2023


Time Seen by Provider:  11:00


Subjective/Events-last exam


3/17/2023:


Much improved


Participation is good


No falls


Pain is chronic neuropathy


Reinserted Clayton catheter due to retention last night








3/16/2023:


Improved dramatically


Stood for 35 seconds with parallel bars


No pain reported except wound


Family at bedside








3/15/2023:


Improved overall


Family at bedside


Wound vac on hold due to bacteria in the wound culture


Dr Zamorano managing the IV abx 








3/14/2023:


Doing much better


Reviewed back history on her pre-existing debility:


10/5/22 gastric ulcer perforation but used wheelchair periodically prior to that

due to neuropathy


11/4/22 Decubitus ulcer admit stage III wound vac placed


1/29/23 used walker but wheelchair for long distances


Speech will see her today





Review of Systems


General:  Fatigue, Malaise





Objective


Exam


Vital Signs





Vital Signs








  Date Time  Temp Pulse Resp B/P (MAP) Pulse Ox O2 Delivery O2 Flow Rate FiO2


 


3/17/23 20:00 37.0 90 20 110/63 (79) 96 Room Air  


 


3/15/23 09:00       2.00 





Capillary Refill :


General Appearance:  No Apparent Distress, WD/WN, Anxious, Chronically ill


HEENT:  PERRL/EOMI, Normal ENT Inspection, Pharynx Normal


Neck:  Full Range of Motion, Normal Inspection, Non Tender, Supple, Carotid 

Bruit


Respiratory:  Chest Non Tender, Lungs Clear, Normal Breath Sounds, No Accessory 

Muscle Use, No Respiratory Distress


Cardiovascular:  Regular Rate, Rhythm, No Edema, No Gallop, No JVD, No Murmur, 

Normal Peripheral Pulses


Gastrointestinal:  Normal Bowel Sounds, No Organomegaly, No Pulsatile Mass, Non 

Tender, Soft


Back:  Normal Inspection, No CVA Tenderness, No Vertebral Tenderness


Extremity:  Normal Capillary Refill, Normal Inspection, Normal Range of Motion 

(except right side), Non Tender, No Calf Tenderness, No Pedal Edema


Neurologic/Psychiatric:  Alert, Oriented x3, CNs II-XII Norm as Tested, Abnormal

CNs II-XII, Depressed Affect, Facial Droop (right ), Motor Weakness (right sided

weakness)


Skin:  Normal Color, Warm/Dry


Lymphatic:  No Adenopathy





Results/Procedures


Lab


Patient resulted labs reviewed.





FIM


Transfers


Therapy Code Descriptions/Definitions 





Functional [place] Measure:


0=Not Assessed/NA        4=Minimal Assistance


1=Total Assistance        5=Supervision or Setup


2=Maximal Assistance  6=Modified [place]


3=Moderate Assistance 7=Complete IndependenceSCALE: Activities may be completed 

with or without assistive devices.





6-Indepedent-patient completes the activity by him/herself with no assistance 

from a helper.


5-Set-up or Clean-up Assistance-helper sets up or cleans up; patient completes 

activity. [place] assists only prior to or  


    following the activity.


4-Supervision or Touching Assistance-helper provides verbal cues and/or 

touching/steadying and/or contact guard assistance as patient completes 

activity. Assistance may be provided   


    throughout the activity or intermittently.


3-Partial/Moderate Assistance-helper does LESS THAN HALF the effort. [place] 

lifts, holds or supports trunk or limbs, but provides less than half the effort.


2-Substantial/Maximal Assistance-helper does MORE THAN HALF the effort. [place] 

lifts or holds trunk or limbs and provides more than half the effort.


1-Akldfgdww-bkxgts does ALL the effort. Patient does none of the effort to 

complete the activity. Or, the assistance of 2 or more helpers is required for 

the patient to complete the  


    activity.


If activity was not attempted, code reason:


7-Patient Refused.


9-Not Applicable-not attempted and the patient did not perform the activity 

before the current illness, exacerbation or injury.


10-Not Attempted due to Environmental Limitations-(lack of equipment, weather 

restraints, etc.).


88-Not Attempted due to Medical Conditions or Safety Concerns.


Roll Left to Right (QC):  3


Sit to Lying (QC):  3


Sit to Stand (QC):  1


Chair/Bed-to-Chair Xfer(QC):  1


Car Transfer (QC):  88





Gait Training


Does the Patient Walk?:  No and Walking Goal NOT indicated


Walk 10 feet (QC):  88


Walk 50 ft with 2 Turns(QC):  88


Walk 150 ft (QC):  88


Walking 10ft/uneven surface-QC:  88





Wheelchair Training


Does the Pt Use a Wheelchair?:  Yes


Wheel 50 ft with 2 turns (QC):  2


Wheel 150 ft (QC):  2


Type of Wheelchair:  Manual





Stair Training


1 Step (curb) (QC):  88


4 Steps (QC):  88


12 Steps (QC):  88





Balance


Picking up an Object (QC):  88





ADL-Treatment


Eating (QC):  5


Oral Hygiene (QC):  6


Shower/Bathe Self (QC):  3 (Mod A)


Upper Body Dressing (QC):  2


Lower Body Dressing (QC):  1


On/Off Footwear (QC):  1


Toileting Hygiene (QC):  2


Toilet Transfer (QC):  1





Assessment/Plan


Assessment and Plan


Assess & Plan/Chief Complaint


Assessment:


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


Infective Endocarditis of Atrial Valve


Dysarthria/expressive aphasia


Dysphagia with PEG tube


Right Knee Effusion 


Sacral Decubitus Ulcer


Anemia-iron def so ordered Venofer


T2DM - ID


Hypothyroidism


Constipation


Hyperlipidemia


GERD


Chronic debility: (10/5/22 gastric ulcer perforation but used wheelchair 

periodically prior to that due to neuropathy, 11/4/22 Decubitus ulcer admit 

stage III wound vac placed, 1/29/23 used walker but wheelchair for long 

distances)


Urinary retention attempted to DC catheter 3/16/23 and required replacement of 

cath 3/17/23





Plan:


Monitor closely


Change Synthroid to PO


ST consult for dysphagia


Utilize PEG


PT OT





3/14/2023:


Monitor closely


Speech therapy to advanced diet if able





3/15/2023:


Monitor closely


Wound care





3/16/2023:


Dramatically improved


Wound care


IV abx





3/17/2023:


Urecholine


Monitor closely





(1) CVA (cerebral vascular accident)











COOKIE CHRISTIE DO                Mar 17, 2023 05:39

## 2023-03-17 NOTE — PHYSICAL THERAPY DAILY NOTE
PT Daily Note-Current


Subjective


Patient in restroom showering with OT pre tx, agrees to PT, has no complaints of

pain.  Will be co-treating with OT due to poor patient mobility, strength, 

endurance, severe debility, coordinate UE and LE during activity, safety and 

reduce risk of falls.





Pain


Section J - Health Conditions


1. Rarely or not at all


2. Occasionally


3. Frequently


4. Almost constantly


8. Unable to answer


Pain Effect on Sleep:  1


Pain Interference with Therapy:  1


Pain Interference w/Day-to-Day:  1





Appearance


Patient in bed post tx with nurse call, phone, tray, all needs met.





Mental Status


Patient Orientation:  Person, Place, Situation


Attachments:  [place]ay[place] Catheter


wound vac





Transfers


SCALE: Activities may be completed with or without assistive devices.





6-Indepedent-patient completes the activity by him/herself with no assistance 

from a helper.


5-Set-up or Clean-up Assistance-helper sets up or cleans up; patient completes 

activity. [place] assists only prior to or  


    following the activity.


4-Supervision or Touching Assistance-helper provides verbal cues and/or 

touching/steadying and/or contact guard assistance as patient completes 

activity. Assistance may be provided   


    throughout the activity or intermittently.


3-Partial/Moderate Assistance-helper does LESS THAN HALF the effort. [place] 

lifts, holds or supports trunk or limbs, but provides less than half the effort.


2-Substantial/Maximal Assistance-helper does MORE THAN HALF the effort. [place] 

lifts or holds trunk or limbs and provides more than half the effort.


4-Busowmkac-zuyieb does ALL the effort. Patient does none of the effort to 

complete the activity. Or, the assistance of 2 or more helpers is required for 

the patient to complete the  


    activity.


If activity was not attempted, code reason:


7-Patient Refused.


9-Not Applicable-not attempted and the patient did not perform the activity 

before the current illness, exacerbation or injury.


10-Not Attempted due to Environmental Limitations-(lack of equipment, weather 

restraints, etc.).


88-Not Attempted due to Medical Conditions or Safety Concerns.


Roll Left & Right (QC):  3


Sit to Lying (QC):  3


Sit to Stand (QC):  1


Chair/Bed-to-Chair Xfer(QC):  1


Patient dresses in shower chair, transfers to  with sit to stand machine, 

propels WC to therapy room.





Weight Bearing


Weight Bearing/Tolerated


Weight Bearing/Tolerated





Wheelchair Training


Does the Pt Use a Wheelchair?:  Yes


Wheel 50 ft with 2 turns (QC):  3


Type of Wheelchair:  Manual


120'x2, min assist, patient now has a lower WC and can use her feet more 

effectively but still needs min assist to help propel and steer





Exercises


Patient stood in the parallel bars x3 with assist of 2, she was able to stand 

for about a minute each time.  Patient also performed manually resisted leg 

press while in WC.  Patient seemed to display some motor planning impairments in

her left leg which is her non-effected side.





Treatments


PT performed bed mobility and transfers, standing, WC mobility, strengthening, 

OT performed dressing, assist with transfers and standing, UE positioning and 

safety during activity.





Assessment


Current Status:  Poor Progress


no progress with functional mobility





PT Long Term Goals


Long Term Goals


PT Long Term Goals Time Frame:  May 8, 2023


Roll Left & Right (QC):  4


Sit to Lying (QC):  4


Lying-Sitting on Side/Bed(QC):  4


Sit to Stand (QC):  2


Chair/Bed-to-Chair Xfer(QC):  2


Toilet Transfer (QC):  2


Car Transfer (QC):  2


Does the Patient Walk:  No and Walking Goal NOT indicated


Walk 10 feet (QC):  1


Walk 50ft with 2 Turns (QC):  88


Walk 150 ft (QC):  88


Walking 10ft on Uneven Surface:  88


1 Step (curb) (QC):  88


4 Steps (QC):  88


12 Steps (QC):  88


Picking up an Object (QC):  88


Does the Pt use WC or Scooter?:  Yes


Wheel 50 feet with 2 turns (QC:  5 (Patient able to use (L) LE and (B) LE's for 

w/c propulsion)


Type:  Manual


Wheel 150 feet:  5


Type:  Manual





PT Plan


Problem List


Problem List:  Activity Tolerance, Functional Strength, Safety, Balance, Gait, 

Transfer, Bed Mobility, ROM





Treatment/Plan


Treatment Plan:  Continue Plan of Care


Treatment Plan:  Bed Mobility, Concurrent Therapy, Education, Functional 

Activity Russ, Functional Strength, Group Therapy, Safety, Therapeutic 

Exercise, Transfers, Other (W/C mobility)


Treatment Duration:  May 8, 2023


Frequency:  At least 5 of 7 days/Wk (IRF)


Estimated Hrs Per Day:  1.5 hours per day


Patient and/or Family Agrees t:  Yes





Safety Risks/Education


Patient Education:  Transfer Techniques, Correct Positioning, W/C Management, 

Safety Issues


Teaching Recipient:  Patient


Teaching Methods:  Demonstration, Discussion


Response to Teaching:  Reinforcement Needed





Time


Time In:  0800


Time Out:  0900


DATE:  Mar 17, 2023


Total Billed Treatment Time:  60


Total Billed Treatment


1 visit


EX 10'


FA 50'





co-treated for 60'











MING HESS PT                Mar 17, 2023 11:29

## 2023-03-18 VITALS — DIASTOLIC BLOOD PRESSURE: 65 MMHG | SYSTOLIC BLOOD PRESSURE: 113 MMHG

## 2023-03-18 VITALS — SYSTOLIC BLOOD PRESSURE: 137 MMHG | DIASTOLIC BLOOD PRESSURE: 60 MMHG

## 2023-03-18 RX ADMIN — ENOXAPARIN SODIUM SCH MG: 100 INJECTION SUBCUTANEOUS at 17:24

## 2023-03-18 RX ADMIN — INSULIN ASPART SCH UNIT: 100 INJECTION, SOLUTION INTRAVENOUS; SUBCUTANEOUS at 11:12

## 2023-03-18 RX ADMIN — FOLIC ACID SCH MG: 1 TABLET ORAL at 09:39

## 2023-03-18 RX ADMIN — INSULIN ASPART SCH UNIT: 100 INJECTION, SOLUTION INTRAVENOUS; SUBCUTANEOUS at 20:24

## 2023-03-18 RX ADMIN — BETHANECHOL CHLORIDE SCH MG: 25 TABLET ORAL at 17:24

## 2023-03-18 RX ADMIN — DOCUSATE SODIUM AND SENNOSIDES SCH EA: 8.6; 5 TABLET, FILM COATED ORAL at 19:49

## 2023-03-18 RX ADMIN — SUCRALFATE SCH GM: 1 TABLET ORAL at 20:49

## 2023-03-18 RX ADMIN — BETHANECHOL CHLORIDE SCH MG: 25 TABLET ORAL at 20:50

## 2023-03-18 RX ADMIN — NYSTATIN SCH GM: 100000 CREAM TOPICAL at 11:35

## 2023-03-18 RX ADMIN — INSULIN ASPART SCH UNIT: 100 INJECTION, SOLUTION INTRAVENOUS; SUBCUTANEOUS at 15:54

## 2023-03-18 RX ADMIN — PREGABALIN SCH MG: 75 CAPSULE ORAL at 20:50

## 2023-03-18 RX ADMIN — POLYETHYLENE GLYCOL (3350) SCH GM: 17 POWDER, FOR SOLUTION ORAL at 19:49

## 2023-03-18 RX ADMIN — SODIUM BICARBONATE SCH ML: 84 INJECTION, SOLUTION INTRAVENOUS at 06:33

## 2023-03-18 RX ADMIN — BETHANECHOL CHLORIDE SCH MG: 25 TABLET ORAL at 11:35

## 2023-03-18 RX ADMIN — LIDOCAINE SCH EA: 50 PATCH CUTANEOUS at 09:40

## 2023-03-18 RX ADMIN — DOCUSATE SODIUM SCH MG: 100 CAPSULE ORAL at 09:31

## 2023-03-18 RX ADMIN — POLYETHYLENE GLYCOL (3350) SCH GM: 17 POWDER, FOR SOLUTION ORAL at 09:31

## 2023-03-18 RX ADMIN — NYSTATIN SCH GM: 100000 CREAM TOPICAL at 21:03

## 2023-03-18 RX ADMIN — BETHANECHOL CHLORIDE SCH MG: 25 TABLET ORAL at 06:33

## 2023-03-18 RX ADMIN — NYSTATIN SCH GM: 100000 CREAM TOPICAL at 13:55

## 2023-03-18 RX ADMIN — Medication SCH EACH: at 09:39

## 2023-03-18 RX ADMIN — AMITRIPTYLINE HYDROCHLORIDE SCH MG: 25 TABLET, FILM COATED ORAL at 20:49

## 2023-03-18 RX ADMIN — SODIUM CHLORIDE SCH MG: 900 INJECTION, SOLUTION INTRAVENOUS at 09:40

## 2023-03-18 RX ADMIN — FAMOTIDINE SCH MG: 20 TABLET, FILM COATED ORAL at 20:50

## 2023-03-18 RX ADMIN — Medication SCH EACH: at 20:49

## 2023-03-18 RX ADMIN — MICONAZOLE NITRATE SCH APPLIC: 20 POWDER TOPICAL at 11:35

## 2023-03-18 RX ADMIN — SUCRALFATE SCH GM: 1 TABLET ORAL at 17:24

## 2023-03-18 RX ADMIN — LEVOTHYROXINE SODIUM SCH MCG: 100 TABLET ORAL at 06:33

## 2023-03-18 RX ADMIN — DOCUSATE SODIUM SCH MG: 100 CAPSULE ORAL at 19:49

## 2023-03-18 RX ADMIN — PREGABALIN SCH MG: 75 CAPSULE ORAL at 09:39

## 2023-03-18 RX ADMIN — VANCOMYCIN SCH MLS/HR: 1.5 INJECTION, SOLUTION INTRAVENOUS at 20:42

## 2023-03-18 RX ADMIN — MICONAZOLE NITRATE SCH APPLIC: 20 POWDER TOPICAL at 21:00

## 2023-03-18 RX ADMIN — SUCRALFATE SCH GM: 1 TABLET ORAL at 11:35

## 2023-03-18 RX ADMIN — DOCUSATE SODIUM AND SENNOSIDES SCH EA: 8.6; 5 TABLET, FILM COATED ORAL at 09:31

## 2023-03-18 RX ADMIN — SUCRALFATE SCH GM: 1 TABLET ORAL at 06:33

## 2023-03-18 RX ADMIN — FAMOTIDINE SCH MG: 20 TABLET, FILM COATED ORAL at 09:39

## 2023-03-18 RX ADMIN — MELATONIN 3 MG ORAL TABLET SCH MG: 3 TABLET ORAL at 20:49

## 2023-03-18 RX ADMIN — INSULIN ASPART SCH UNIT: 100 INJECTION, SOLUTION INTRAVENOUS; SUBCUTANEOUS at 06:15

## 2023-03-18 RX ADMIN — Medication SCH MCG: at 06:33

## 2023-03-18 NOTE — PHYSICAL THERAPY DAILY NOTE
PT Daily Note-Current


Subjective


Pt. and  present. Pt indicates she would like to eat brkfst,  

would like to assist and wants help and instruction to assist his wife. Pt. c/o 

some pain at wound site with gentle rolling and turning and positioning . Does 

not rate pain





Pain


Section J - Health Conditions


1. Rarely or not at all


2. Occasionally


3. Frequently


4. Almost constantly


8. Unable to answer


Pain Effect on Sleep:  1


Pain Interference with Therapy:  1


Pain Interference w/Day-to-Day:  1





Mental Status


Patient Orientation:  Normal For Age


Attachments:  Other-See Comments ([place])





Transfers


SCALE: Activities may be completed with or without assistive devices.





6-Indepedent-patient completes the activity by him/herself with no assistance 

from a helper.


5-Set-up or Clean-up Assistance-helper sets up or cleans up; patient completes 

activity. [place] assists only prior to or  


    following the activity.


4-Supervision or Touching Assistance-helper provides verbal cues and/or 

touching/steadying and/or contact guard assistance as patient completes 

activity. Assistance may be provided   


    throughout the activity or intermittently.


3-Partial/Moderate Assistance-helper does LESS THAN HALF the effort. [place] 

lifts, holds or supports trunk or limbs, but provides less than half the effort.


2-Substantial/Maximal Assistance-helper does MORE THAN HALF the effort. [place] 

lifts or holds trunk or limbs and provides more than half the effort.


1-Soiccblzg-aitfxh does ALL the effort. Patient does none of the effort to 

complete the activity. Or, the assistance of 2 or more helpers is required for 

the patient to complete the  


    activity.


If activity was not attempted, code reason:


7-Patient Refused.


9-Not Applicable-not attempted and the patient did not perform the activity 

before the current illness, exacerbation or injury.


10-Not Attempted due to Environmental Limitations-(lack of equipment, weather 

restraints, etc.).


88-Not Attempted due to Medical Conditions or Safety Concerns.


Roll Left & Right (QC):  3


pt and  education in rolling left to right, managing positioning with 

pillow support and instruction for  in use of draw sheet use t move up in

bed but minimizing shearing etc. This required asst of 2 to move up in bed,





Weight Bearing


Weight Bearing/Tolerated


Weight Bearing/Tolerated





Exercises


Supine Ex:  Ankle pumps, Rolling, Glut sets, Hip abd/add


Supine Reps:  8





Treatments


bed mob, family training, meal prep and assist by , ROHIT incorporated 

into bed mob and positioning. Pt. left upright for meal with  assist . 

Also educated reviewed about positioning to promoted healing as as meal 

complete, " ring for assist for position change"





Assessment


Current Status:  Good Progress


supportive family





PT Long Term Goals


Long Term Goals


PT Long Term Goals Time Frame:  May 8, 2023


Roll Left & Right (QC):  4


Sit to Lying (QC):  4


Lying-Sitting on Side/Bed(QC):  4


Sit to Stand (QC):  2


Chair/Bed-to-Chair Xfer(QC):  2


Toilet Transfer (QC):  2


Car Transfer (QC):  2


Does the Patient Walk:  No and Walking Goal NOT indicated


Walk 10 feet (QC):  1


Walk 50ft with 2 Turns (QC):  88


Walk 150 ft (QC):  88


Walking 10ft on Uneven Surface:  88


1 Step (curb) (QC):  88


4 Steps (QC):  88


12 Steps (QC):  88


Picking up an Object (QC):  88


Does the Pt use WC or Scooter?:  Yes


Wheel 50 feet with 2 turns (QC:  5 (Patient able to use (L) LE and (B) LE's for 

w/c propulsion)


Type:  Manual


Wheel 150 feet:  5


Type:  Manual





PT Plan


Treatment/Plan


Treatment Plan:  Continue Plan of Care


Treatment Plan:  Bed Mobility, Concurrent Therapy, Education, Functional 

Activity Russ, Functional Strength, Group Therapy, Safety, Therapeutic 

Exercise, Transfers, Other (W/C mobility)


Treatment Duration:  May 8, 2023


Frequency:  At least 5 of 7 days/Wk (IRF)


Estimated Hrs Per Day:  1.5 hours per day


Patient and/or Family Agrees t:  Yes





Safety Risks/Education


Patient Education:  Transfer Techniques, Correct Positioning, Disease Process, 

Safety Issues


Teaching Recipient:  Patient, Family


Teaching Methods:  Demonstration, Discussion


Response to Teaching:  Verbalize Understanding, Return Demonstration, 

Reinforcement Needed





Time


Time In:  825


Time Out:  840


DATE:  Mar 18, 2023


Total Billed Treatment Time:  15


Total Billed Treatment


1,[id number]











JO CASAS PTA             Mar 18, 2023 08:52

## 2023-03-18 NOTE — PM&R PROGRESS NOTE
Subjective


HPI/CC On Admission


Date Seen by Provider:  Mar 18, 2023


Time Seen by Provider:  11:00


Subjective/Events-last exam


3/18/2023:


No major issues


DC tube feeding to prevent overfeeding


Dysarthria improved


Reviewed meds








3/17/2023:


Much improved


Participation is good


No falls


Pain is chronic neuropathy


Reinserted Clayton catheter due to retention last night








3/16/2023:


Improved dramatically


Stood for 35 seconds with parallel bars


No pain reported except wound


Family at bedside








3/15/2023:


Improved overall


Family at bedside


Wound vac on hold due to bacteria in the wound culture


Dr Zamorano managing the IV abx 








3/14/2023:


Doing much better


Reviewed back history on her pre-existing debility:


10/5/22 gastric ulcer perforation but used wheelchair periodically prior to that

due to neuropathy


11/4/22 Decubitus ulcer admit stage III wound vac placed


1/29/23 used walker but wheelchair for long distances


Speech will see her today





Review of Systems


General:  Fatigue, Malaise





Objective


Exam


Vital Signs





Vital Signs








  Date Time  Temp Pulse Resp B/P (MAP) Pulse Ox O2 Delivery O2 Flow Rate FiO2


 


3/18/23 09:00      Room Air  


 


3/18/23 07:01 36.0 85 18 113/65 (81) 92   


 


3/15/23 09:00       2.00 





Capillary Refill :


General Appearance:  No Apparent Distress, WD/WN, Anxious, Chronically ill


HEENT:  PERRL/EOMI, Normal ENT Inspection, Pharynx Normal


Neck:  Full Range of Motion, Normal Inspection, Non Tender, Supple, Carotid 

Bruit


Respiratory:  Chest Non Tender, Lungs Clear, Normal Breath Sounds, No Accessory 

Muscle Use, No Respiratory Distress


Cardiovascular:  Regular Rate, Rhythm, No Edema, No Gallop, No JVD, No Murmur, 

Normal Peripheral Pulses


Gastrointestinal:  Normal Bowel Sounds, No Organomegaly, No Pulsatile Mass, Non 

Tender, Soft


Back:  Normal Inspection, No CVA Tenderness, No Vertebral Tenderness


Extremity:  Normal Capillary Refill, Normal Inspection, Normal Range of Motion, 

Non Tender, No Calf Tenderness, No Pedal Edema


Neurologic/Psychiatric:  Alert, Oriented x3, CNs II-XII Norm as Tested, Abnormal

CNs II-XII, Depressed Affect, Facial Droop, Motor Weakness


Skin:  Normal Color, Warm/Dry


Lymphatic:  No Adenopathy





Results/Procedures


Lab


Patient resulted labs reviewed.





FIM


Transfers


Therapy Code Descriptions/Definitions 





Functional [place] Measure:


0=Not Assessed/NA        4=Minimal Assistance


1=Total Assistance        5=Supervision or Setup


2=Maximal Assistance  6=Modified [place]


3=Moderate Assistance 7=Complete IndependenceSCALE: Activities may be completed 

with or without assistive devices.





6-Indepedent-patient completes the activity by him/herself with no assistance 

from a helper.


5-Set-up or Clean-up Assistance-helper sets up or cleans up; patient completes 

activity. [place] assists only prior to or  


    following the activity.


4-Supervision or Touching Assistance-helper provides verbal cues and/or 

touching/steadying and/or contact guard assistance as patient completes 

activity. Assistance may be provided   


    throughout the activity or intermittently.


3-Partial/Moderate Assistance-helper does LESS THAN HALF the effort. [place] 

lifts, holds or supports trunk or limbs, but provides less than half the effort.


2-Substantial/Maximal Assistance-helper does MORE THAN HALF the effort. [place] 

lifts or holds trunk or limbs and provides more than half the effort.


5-Evexzhtmd-jhhtxc does ALL the effort. Patient does none of the effort to 

complete the activity. Or, the assistance of 2 or more helpers is required for 

the patient to complete the  


    activity.


If activity was not attempted, code reason:


7-Patient Refused.


9-Not Applicable-not attempted and the patient did not perform the activity 

before the current illness, exacerbation or injury.


10-Not Attempted due to Environmental Limitations-(lack of equipment, weather 

restraints, etc.).


88-Not Attempted due to Medical Conditions or Safety Concerns.


Roll Left to Right (QC):  3


Sit to Lying (QC):  3


Sit to Stand (QC):  1


Chair/Bed-to-Chair Xfer(QC):  1


Car Transfer (QC):  88





Gait Training


Does the Patient Walk?:  No and Walking Goal NOT indicated


Walk 10 feet (QC):  88


Walk 50 ft with 2 Turns(QC):  88


Walk 150 ft (QC):  88


Walking 10ft/uneven surface-QC:  88





Wheelchair Training


Does the Pt Use a Wheelchair?:  Yes


Wheel 50 ft with 2 turns (QC):  2


Wheel 150 ft (QC):  2


Type of Wheelchair:  Manual





Stair Training


1 Step (curb) (QC):  88


4 Steps (QC):  88


12 Steps (QC):  88





Balance


Picking up an Object (QC):  88





ADL-Treatment


Eating (QC):  5


Oral Hygiene (QC):  6


Shower/Bathe Self (QC):  3


Upper Body Dressing (QC):  3 (mod A)


Lower Body Dressing (QC):  1


On/Off Footwear (QC):  1


Toileting Hygiene (QC):  1


Toilet Transfer (QC):  1





Assessment/Plan


Assessment and Plan


Assess & Plan/Chief Complaint


Assessment:


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


Infective Endocarditis of Atrial Valve


Dysarthria/expressive aphasia


Dysphagia with PEG tube


Right Knee Effusion 


Sacral Decubitus Ulcer with MRSA so started Vanc


Anemia-iron def so ordered Venofer


T2DM - ID


Hypothyroidism


Constipation


Hyperlipidemia


GERD


Chronic debility: (10/5/22 gastric ulcer perforation but used wheelchair 

periodically prior to that due to neuropathy, 11/4/22 Decubitus ulcer admit 

stage III wound vac placed, 1/29/23 used walker but wheelchair for long 

distances)


Urinary retention attempted to DC catheter 3/16/23 and required replacement of 

cath 3/17/23





Plan:


Monitor closely


Change Synthroid to PO


ST consult for dysphagia


Utilize PEG


PT OT





3/14/2023:


Monitor closely


Speech therapy to advanced diet if able





3/15/2023:


Monitor closely


Wound care





3/16/2023:


Dramatically improved


Wound care


IV abx





3/17/2023:


Urecholine


Monitor closely





3/18/2023:


DC tube feeding





(1) CVA (cerebral vascular accident)











COOKIE CHRISTIE DO                Mar 18, 2023 06:45

## 2023-03-19 VITALS — SYSTOLIC BLOOD PRESSURE: 108 MMHG | DIASTOLIC BLOOD PRESSURE: 58 MMHG

## 2023-03-19 VITALS — SYSTOLIC BLOOD PRESSURE: 132 MMHG | DIASTOLIC BLOOD PRESSURE: 62 MMHG

## 2023-03-19 RX ADMIN — BETHANECHOL CHLORIDE SCH MG: 25 TABLET ORAL at 06:23

## 2023-03-19 RX ADMIN — NYSTATIN SCH GM: 100000 CREAM TOPICAL at 20:56

## 2023-03-19 RX ADMIN — MELATONIN 3 MG ORAL TABLET SCH MG: 3 TABLET ORAL at 20:08

## 2023-03-19 RX ADMIN — INSULIN ASPART SCH UNIT: 100 INJECTION, SOLUTION INTRAVENOUS; SUBCUTANEOUS at 06:23

## 2023-03-19 RX ADMIN — FOLIC ACID SCH MG: 1 TABLET ORAL at 09:43

## 2023-03-19 RX ADMIN — SUCRALFATE SCH GM: 1 TABLET ORAL at 12:54

## 2023-03-19 RX ADMIN — DOCUSATE SODIUM AND SENNOSIDES SCH EA: 8.6; 5 TABLET, FILM COATED ORAL at 09:44

## 2023-03-19 RX ADMIN — PREGABALIN SCH MG: 75 CAPSULE ORAL at 09:43

## 2023-03-19 RX ADMIN — INSULIN ASPART SCH UNIT: 100 INJECTION, SOLUTION INTRAVENOUS; SUBCUTANEOUS at 16:06

## 2023-03-19 RX ADMIN — MICONAZOLE NITRATE SCH APPLIC: 20 POWDER TOPICAL at 20:13

## 2023-03-19 RX ADMIN — POLYETHYLENE GLYCOL (3350) SCH GM: 17 POWDER, FOR SOLUTION ORAL at 09:44

## 2023-03-19 RX ADMIN — INSULIN ASPART SCH UNIT: 100 INJECTION, SOLUTION INTRAVENOUS; SUBCUTANEOUS at 20:56

## 2023-03-19 RX ADMIN — PREGABALIN SCH MG: 75 CAPSULE ORAL at 20:13

## 2023-03-19 RX ADMIN — VANCOMYCIN SCH MLS/HR: 1.5 INJECTION, SOLUTION INTRAVENOUS at 20:54

## 2023-03-19 RX ADMIN — SUCRALFATE SCH GM: 1 TABLET ORAL at 17:08

## 2023-03-19 RX ADMIN — ENOXAPARIN SODIUM SCH MG: 100 INJECTION SUBCUTANEOUS at 17:08

## 2023-03-19 RX ADMIN — NYSTATIN SCH GM: 100000 CREAM TOPICAL at 12:55

## 2023-03-19 RX ADMIN — SUCRALFATE SCH GM: 1 TABLET ORAL at 20:08

## 2023-03-19 RX ADMIN — SUCRALFATE SCH GM: 1 TABLET ORAL at 06:23

## 2023-03-19 RX ADMIN — SODIUM BICARBONATE SCH ML: 84 INJECTION, SOLUTION INTRAVENOUS at 06:24

## 2023-03-19 RX ADMIN — AMITRIPTYLINE HYDROCHLORIDE SCH MG: 25 TABLET, FILM COATED ORAL at 20:08

## 2023-03-19 RX ADMIN — DOCUSATE SODIUM SCH MG: 100 CAPSULE ORAL at 20:56

## 2023-03-19 RX ADMIN — POLYETHYLENE GLYCOL (3350) SCH GM: 17 POWDER, FOR SOLUTION ORAL at 20:56

## 2023-03-19 RX ADMIN — BETHANECHOL CHLORIDE SCH MG: 25 TABLET ORAL at 20:09

## 2023-03-19 RX ADMIN — FAMOTIDINE SCH MG: 20 TABLET, FILM COATED ORAL at 20:07

## 2023-03-19 RX ADMIN — DOCUSATE SODIUM AND SENNOSIDES SCH EA: 8.6; 5 TABLET, FILM COATED ORAL at 20:07

## 2023-03-19 RX ADMIN — DOCUSATE SODIUM SCH MG: 100 CAPSULE ORAL at 09:44

## 2023-03-19 RX ADMIN — Medication SCH EACH: at 20:13

## 2023-03-19 RX ADMIN — LIDOCAINE SCH EA: 50 PATCH CUTANEOUS at 09:43

## 2023-03-19 RX ADMIN — LEVOTHYROXINE SODIUM SCH MCG: 100 TABLET ORAL at 06:23

## 2023-03-19 RX ADMIN — Medication SCH MCG: at 06:24

## 2023-03-19 RX ADMIN — INSULIN ASPART SCH UNIT: 100 INJECTION, SOLUTION INTRAVENOUS; SUBCUTANEOUS at 11:15

## 2023-03-19 RX ADMIN — MICONAZOLE NITRATE SCH APPLIC: 20 POWDER TOPICAL at 09:43

## 2023-03-19 RX ADMIN — Medication SCH EACH: at 09:43

## 2023-03-19 RX ADMIN — BETHANECHOL CHLORIDE SCH MG: 25 TABLET ORAL at 12:54

## 2023-03-19 RX ADMIN — NYSTATIN SCH GM: 100000 CREAM TOPICAL at 09:43

## 2023-03-19 RX ADMIN — BETHANECHOL CHLORIDE SCH MG: 25 TABLET ORAL at 17:08

## 2023-03-19 RX ADMIN — FAMOTIDINE SCH MG: 20 TABLET, FILM COATED ORAL at 09:44

## 2023-03-19 NOTE — PM&R PROGRESS NOTE
Subjective


HPI/CC On Admission


Date Seen by Provider:  Mar 19, 2023


Time Seen by Provider:  15:30


Subjective/Events-last exam


3/19/2023:


Much improved


Resting today


Family at bedside


No pain reported


Will DC catheter tomorrow








3/18/2023:


No major issues


DC tube feeding to prevent overfeeding


Dysarthria improved


Reviewed meds








3/17/2023:


Much improved


Participation is good


No falls


Pain is chronic neuropathy


Reinserted Clayton catheter due to retention last night








3/16/2023:


Improved dramatically


Stood for 35 seconds with parallel bars


No pain reported except wound


Family at bedside








3/15/2023:


Improved overall


Family at bedside


Wound vac on hold due to bacteria in the wound culture


Dr Zamorano managing the IV abx 








3/14/2023:


Doing much better


Reviewed back history on her pre-existing debility:


10/5/22 gastric ulcer perforation but used wheelchair periodically prior to that

due to neuropathy


11/4/22 Decubitus ulcer admit stage III wound vac placed


1/29/23 used walker but wheelchair for long distances


Speech will see her today





Review of Systems


General:  Fatigue, Malaise


Neurological:  Weakness, Incoordination





Objective


Exam


Vital Signs





Vital Signs








  Date Time  Temp Pulse Resp B/P (MAP) Pulse Ox O2 Delivery O2 Flow Rate FiO2


 


3/19/23 19:06 36.1 86 22 132/62 (85) 93 Room Air  


 


3/15/23 09:00       2.00 





Capillary Refill :


General Appearance:  No Apparent Distress, WD/WN, Anxious, Chronically ill


HEENT:  PERRL/EOMI, Normal ENT Inspection, Pharynx Normal


Neck:  Full Range of Motion, Normal Inspection, Non Tender, Supple, Carotid 

Bruit


Respiratory:  Chest Non Tender, Lungs Clear, Normal Breath Sounds, No Accessory 

Muscle Use, No Respiratory Distress


Cardiovascular:  Regular Rate, Rhythm, No Edema, No Gallop, No JVD, No Murmur, 

Normal Peripheral Pulses


Gastrointestinal:  Normal Bowel Sounds, No Organomegaly, No Pulsatile Mass, Non 

Tender, Soft


Back:  Normal Inspection, No CVA Tenderness, No Vertebral Tenderness


Extremity:  Normal Capillary Refill, Normal Inspection, Normal Range of Motion, 

Non Tender, No Calf Tenderness, No Pedal Edema


Neurologic/Psychiatric:  Alert, Oriented x3, CNs II-XII Norm as Tested, Abnormal

CNs II-XII, Depressed Affect, Facial Droop, Motor Weakness


Skin:  Normal Color, Warm/Dry


Lymphatic:  No Adenopathy





Results/Procedures


Lab


Patient resulted labs reviewed.





FIM


Transfers


Therapy Code Descriptions/Definitions 





Functional [place] Measure:


0=Not Assessed/NA        4=Minimal Assistance


1=Total Assistance        5=Supervision or Setup


2=Maximal Assistance  6=Modified [place]


3=Moderate Assistance 7=Complete IndependenceSCALE: Activities may be completed 

with or without assistive devices.





6-Indepedent-patient completes the activity by him/herself with no assistance 

from a helper.


5-Set-up or Clean-up Assistance-helper sets up or cleans up; patient completes 

activity. [place] assists only prior to or  


    following the activity.


4-Supervision or Touching Assistance-helper provides verbal cues and/or 

touching/steadying and/or contact guard assistance as patient completes 

activity. Assistance may be provided   


    throughout the activity or intermittently.


3-Partial/Moderate Assistance-helper does LESS THAN HALF the effort. [place] 

lifts, holds or supports trunk or limbs, but provides less than half the effort.


2-Substantial/Maximal Assistance-helper does MORE THAN HALF the effort. [place] 

lifts or holds trunk or limbs and provides more than half the effort.


3-Ohzjhvrxz-jcyxco does ALL the effort. Patient does none of the effort to compl

ete the activity. Or, the assistance of 2 or more helpers is required for the 

patient to complete the  


    activity.


If activity was not attempted, code reason:


7-Patient Refused.


9-Not Applicable-not attempted and the patient did not perform the activity 

before the current illness, exacerbation or injury.


10-Not Attempted due to Environmental Limitations-(lack of equipment, weather 

restraints, etc.).


88-Not Attempted due to Medical Conditions or Safety Concerns.


Roll Left to Right (QC):  3


Sit to Lying (QC):  3


Sit to Stand (QC):  1


Chair/Bed-to-Chair Xfer(QC):  1


Car Transfer (QC):  88





Gait Training


Does the Patient Walk?:  No and Walking Goal NOT indicated


Walk 10 feet (QC):  88


Walk 50 ft with 2 Turns(QC):  88


Walk 150 ft (QC):  88


Walking 10ft/uneven surface-QC:  88





Wheelchair Training


Does the Pt Use a Wheelchair?:  Yes


Wheel 50 ft with 2 turns (QC):  2


Wheel 150 ft (QC):  2


Type of Wheelchair:  Manual





Stair Training


1 Step (curb) (QC):  88


4 Steps (QC):  88


12 Steps (QC):  88





Balance


Picking up an Object (QC):  88





ADL-Treatment


Eating (QC):  5


Oral Hygiene (QC):  6


Shower/Bathe Self (QC):  3


Upper Body Dressing (QC):  3 (mod A)


Lower Body Dressing (QC):  1


On/Off Footwear (QC):  1


Toileting Hygiene (QC):  1


Toilet Transfer (QC):  1





Assessment/Plan


Assessment and Plan


Assess & Plan/Chief Complaint


Assessment:


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


Infective Endocarditis of Atrial Valve


Dysarthria/expressive aphasia


Dysphagia with PEG tube


Right Knee Effusion 


Sacral Decubitus Ulcer with MRSA so started Vanc


Anemia-iron def so ordered Venofer


T2DM - ID


Hypothyroidism


Constipation


Hyperlipidemia


GERD


Chronic debility: (10/5/22 gastric ulcer perforation but used wheelchair 

periodically prior to that due to neuropathy, 11/4/22 Decubitus ulcer admit 

stage III wound vac placed, 1/29/23 used walker but wheelchair for long 

distances)


Urinary retention attempted to DC catheter 3/16/23 and required replacement of 

cath 3/17/23





Plan:


Monitor closely


Change Synthroid to PO


ST consult for dysphagia


Utilize PEG


PT OT





3/14/2023:


Monitor closely


Speech therapy to advanced diet if able





3/15/2023:


Monitor closely


Wound care





3/16/2023:


Dramatically improved


Wound care


IV abx





3/17/2023:


Urecholine


Monitor closely





3/18/2023:


DC tube feeding





3/19/2023:


DC catheter tomorrow





(1) CVA (cerebral vascular accident)











COOKIE CHRISTIE DO                Mar 19, 2023 15:24

## 2023-03-20 VITALS — SYSTOLIC BLOOD PRESSURE: 124 MMHG | DIASTOLIC BLOOD PRESSURE: 70 MMHG

## 2023-03-20 VITALS — DIASTOLIC BLOOD PRESSURE: 66 MMHG | SYSTOLIC BLOOD PRESSURE: 120 MMHG

## 2023-03-20 LAB
ALBUMIN SERPL-MCNC: 2.5 GM/DL (ref 3.2–4.5)
ALP SERPL-CCNC: 79 U/L (ref 40–136)
ALT SERPL-CCNC: 9 U/L (ref 0–55)
BASOPHILS # BLD AUTO: 0.1 10^3/UL (ref 0–0.1)
BASOPHILS NFR BLD AUTO: 1 % (ref 0–10)
BILIRUB SERPL-MCNC: 0.4 MG/DL (ref 0.1–1)
BUN/CREAT SERPL: 23
CALCIUM SERPL-MCNC: 8.8 MG/DL (ref 8.5–10.1)
CHLORIDE SERPL-SCNC: 106 MMOL/L (ref 98–107)
CO2 SERPL-SCNC: 23 MMOL/L (ref 21–32)
CREAT SERPL-MCNC: 0.52 MG/DL (ref 0.6–1.3)
EOSINOPHIL # BLD AUTO: 0.2 10^3/UL (ref 0–0.3)
EOSINOPHIL NFR BLD AUTO: 3 % (ref 0–10)
GFR SERPLBLD BASED ON 1.73 SQ M-ARVRAT: 108 ML/MIN
GLUCOSE SERPL-MCNC: 108 MG/DL (ref 70–105)
HCT VFR BLD CALC: 30 % (ref 35–52)
HGB BLD-MCNC: 9.1 G/DL (ref 11.5–16)
LYMPHOCYTES # BLD AUTO: 1 10^3/UL (ref 1–4)
LYMPHOCYTES NFR BLD AUTO: 20 % (ref 12–44)
MANUAL DIFFERENTIAL PERFORMED BLD QL: NO
MCH RBC QN AUTO: 24 PG (ref 25–34)
MCHC RBC AUTO-ENTMCNC: 30 G/DL (ref 32–36)
MCV RBC AUTO: 79 FL (ref 80–99)
MONOCYTES # BLD AUTO: 0.4 10^3/UL (ref 0–1)
MONOCYTES NFR BLD AUTO: 9 % (ref 0–12)
NEUTROPHILS # BLD AUTO: 3.4 10^3/UL (ref 1.8–7.8)
NEUTROPHILS NFR BLD AUTO: 67 % (ref 42–75)
PLATELET # BLD: 301 10^3/UL (ref 130–400)
PMV BLD AUTO: 9.1 FL (ref 9–12.2)
POTASSIUM SERPL-SCNC: 3.2 MMOL/L (ref 3.6–5)
PROT SERPL-MCNC: 6.1 GM/DL (ref 6.4–8.2)
SODIUM SERPL-SCNC: 139 MMOL/L (ref 135–145)
WBC # BLD AUTO: 5.1 10^3/UL (ref 4.3–11)

## 2023-03-20 RX ADMIN — BETHANECHOL CHLORIDE SCH MG: 25 TABLET ORAL at 11:35

## 2023-03-20 RX ADMIN — POLYETHYLENE GLYCOL (3350) SCH GM: 17 POWDER, FOR SOLUTION ORAL at 20:53

## 2023-03-20 RX ADMIN — BETHANECHOL CHLORIDE SCH MG: 25 TABLET ORAL at 16:24

## 2023-03-20 RX ADMIN — INSULIN ASPART SCH UNIT: 100 INJECTION, SOLUTION INTRAVENOUS; SUBCUTANEOUS at 11:29

## 2023-03-20 RX ADMIN — FOLIC ACID SCH MG: 1 TABLET ORAL at 10:02

## 2023-03-20 RX ADMIN — MICONAZOLE NITRATE SCH APPLIC: 20 POWDER TOPICAL at 10:11

## 2023-03-20 RX ADMIN — DOCUSATE SODIUM AND SENNOSIDES SCH EA: 8.6; 5 TABLET, FILM COATED ORAL at 09:00

## 2023-03-20 RX ADMIN — LIDOCAINE SCH EA: 50 PATCH CUTANEOUS at 10:01

## 2023-03-20 RX ADMIN — POLYETHYLENE GLYCOL (3350) SCH GM: 17 POWDER, FOR SOLUTION ORAL at 10:03

## 2023-03-20 RX ADMIN — FAMOTIDINE SCH MG: 20 TABLET, FILM COATED ORAL at 10:02

## 2023-03-20 RX ADMIN — SUCRALFATE SCH GM: 1 TABLET ORAL at 11:35

## 2023-03-20 RX ADMIN — DOCUSATE SODIUM SCH MG: 100 CAPSULE ORAL at 20:53

## 2023-03-20 RX ADMIN — MICONAZOLE NITRATE SCH APPLIC: 20 POWDER TOPICAL at 21:04

## 2023-03-20 RX ADMIN — POTASSIUM CHLORIDE SCH MEQ: 750 TABLET, FILM COATED, EXTENDED RELEASE ORAL at 17:44

## 2023-03-20 RX ADMIN — BETHANECHOL CHLORIDE SCH MG: 25 TABLET ORAL at 21:03

## 2023-03-20 RX ADMIN — FAMOTIDINE SCH MG: 20 TABLET, FILM COATED ORAL at 21:03

## 2023-03-20 RX ADMIN — Medication SCH EACH: at 21:02

## 2023-03-20 RX ADMIN — Medication SCH EACH: at 10:03

## 2023-03-20 RX ADMIN — PREGABALIN SCH MG: 75 CAPSULE ORAL at 21:03

## 2023-03-20 RX ADMIN — Medication SCH MCG: at 06:31

## 2023-03-20 RX ADMIN — SUCRALFATE SCH GM: 1 TABLET ORAL at 16:24

## 2023-03-20 RX ADMIN — SODIUM BICARBONATE SCH ML: 84 INJECTION, SOLUTION INTRAVENOUS at 06:32

## 2023-03-20 RX ADMIN — INSULIN ASPART SCH UNIT: 100 INJECTION, SOLUTION INTRAVENOUS; SUBCUTANEOUS at 20:11

## 2023-03-20 RX ADMIN — NYSTATIN SCH GM: 100000 CREAM TOPICAL at 12:17

## 2023-03-20 RX ADMIN — PREGABALIN SCH MG: 75 CAPSULE ORAL at 10:02

## 2023-03-20 RX ADMIN — BETHANECHOL CHLORIDE SCH MG: 25 TABLET ORAL at 06:31

## 2023-03-20 RX ADMIN — DOCUSATE SODIUM AND SENNOSIDES SCH EA: 8.6; 5 TABLET, FILM COATED ORAL at 20:54

## 2023-03-20 RX ADMIN — LEVOTHYROXINE SODIUM SCH MCG: 100 TABLET ORAL at 06:31

## 2023-03-20 RX ADMIN — INSULIN ASPART SCH UNIT: 100 INJECTION, SOLUTION INTRAVENOUS; SUBCUTANEOUS at 15:49

## 2023-03-20 RX ADMIN — DOCUSATE SODIUM SCH MG: 100 CAPSULE ORAL at 09:00

## 2023-03-20 RX ADMIN — ENOXAPARIN SODIUM SCH MG: 100 INJECTION SUBCUTANEOUS at 16:24

## 2023-03-20 RX ADMIN — POTASSIUM CHLORIDE SCH MEQ: 750 TABLET, FILM COATED, EXTENDED RELEASE ORAL at 10:02

## 2023-03-20 RX ADMIN — SODIUM CHLORIDE SCH MG: 900 INJECTION, SOLUTION INTRAVENOUS at 10:01

## 2023-03-20 RX ADMIN — SUCRALFATE SCH GM: 1 TABLET ORAL at 06:31

## 2023-03-20 RX ADMIN — AMITRIPTYLINE HYDROCHLORIDE SCH MG: 25 TABLET, FILM COATED ORAL at 21:03

## 2023-03-20 RX ADMIN — INSULIN ASPART SCH UNIT: 100 INJECTION, SOLUTION INTRAVENOUS; SUBCUTANEOUS at 06:18

## 2023-03-20 RX ADMIN — SUCRALFATE SCH GM: 1 TABLET ORAL at 21:03

## 2023-03-20 RX ADMIN — MELATONIN 3 MG ORAL TABLET SCH MG: 3 TABLET ORAL at 21:02

## 2023-03-20 RX ADMIN — NYSTATIN SCH GM: 100000 CREAM TOPICAL at 09:00

## 2023-03-20 RX ADMIN — NYSTATIN SCH GM: 100000 CREAM TOPICAL at 21:04

## 2023-03-20 NOTE — WOUND CARE ASSESSMENT
Wound Care Assessment


Date Seen by Provider:  Mar 20, 2023


Time Seen by Provider:  12:00


Chief Complaint


Stage 4 pressure ulcer with h/o osteomyelitis (acute)


HPI


This pleasant 57 year old patient was admitted to inpatient rehab with a very 

complex medical history. She has suffered a severe left sided CVA (embolic) with

history also for bacterial endocarditis with suspected source of sacral pressure

ulcer. She has had sacral debridement (including karly debridement) at [facility] in [place] in the past. She has had prolonged course of antibiotics as 

recommended by ID during that stay (6 weeks of IV Rocephen). Her wound healing 

will also be complicated by immobility, abnormal weight loss (50#), PEM, and 

anemia. She was receiving tube feeds but is now taking oral and PEG tube has 

been out since the weekend. She has a h/o well controlled DM2 as well. Her 

albumin was 2.5 on latest labs. She did bring her own low carb protein shakes 

for her stay. We will order glucerna as an alternative while she is with us. 

During her wound care course, she has struggled with soiling of her wound bed 

(fecal contamination). We will attempted to seal once again today. If this 

fails, we will switch to WTD dressings with Vashe. I do plan (due to her delayed

healing and h/o osteo) to check ESR, CRP and MRI. She is originally from [place] and 

plans to return to [place] once she is d/c'd from inpatient rehab. Should chronic 

refractory osteomyelitis be confirmed, I will discuss with Dr. Zamorano as to 

whether further debridement should occur during current hospitalization vs. as 

outpatient. Bone culture should be obtained for targeted prolonged antibiotics 

if so proven. She can obviously receive this outpatient wound care nearer her 

home, should she be discharged prior to implementation of plan of care.


Past Medical History:  Admits Diabetes Type II


Anemia, severe PEM with abnormal weight loss, CVA (embolic), h/o acute sacral 

osteomyelitis, infective endocarditis


Smoking Status:  Unknown if Ever Smoked


Recreational Drug Use:  No


Alcohol Use:  Denies Use


Review of Systems


General:  Appetite


HEENT:  Visual Changes (double vision)


Gastrointestinal:  Other (fecal incontinence)


Genitourinary:  Incontinence


Neurological:  Weakness, Incoordination, Change in speech





Exam





Vital Signs








  Date Time  Temp Pulse Resp B/P (MAP) Pulse Ox O2 Delivery O2 Flow Rate FiO2


 


3/20/23 09:00      Room Air  


 


3/20/23 07:41 36.2 91 14 124/70 (88) 90   


 


3/15/23 09:00       2.00 





Capillary Refill :


General Appearance:  no apparent distress, obese


HEENT:  other (left facial droop)


Cardiovascular:  no edema


Respiratory:  no respiratory distress, no accessory muscle use


Extremities:  other (R. sided weakness)


Neurologic/Psychiatric:  alert, oriented x 3, other (speech difficulty. History 

obtained with assistance from family)


Skin:  normal color


Skin Problem Location:  other (sacrum)


Wound assessment: 2.5x2.5x2.6cm. The epithelialization is small. There is a 

tunnel at 3 o'clock of 1.3cm, Drainage is large and serosanguinous. Granulation 

is large and pink, Necrotic is small and slough. Margins show epibole. There is 

bone exposed (I am unable to ascertain if this is necrotic).





Results


Laboratory Tests


3/19/23 20:05: Vancomycin Level Trough 10.8


3/19/23 20:41: Glucometer 114H


3/20/23 05:40: 


White Blood Count 5.1, Red Blood Count 3.84, Hemoglobin 9.1L, Hematocrit 30L, M

sourav Corpuscular Volume 79L, Mean Corpuscular Hemoglobin 24L, Mean Corpuscular 

Hemoglobin Concent 30L, Red Cell Distribution Width 20.8H, Platelet Count 301, 

Mean Platelet Volume 9.1, Immature Granulocyte % (Auto) 0, Neutrophils (%) 

(Auto) 67, Lymphocytes (%) (Auto) 20, Monocytes (%) (Auto) 9, Eosinophils (%) (

Auto) 3, Basophils (%) (Auto) 1, Neutrophils # (Auto) 3.4, Lymphocytes # (Auto) 

1.0, Monocytes # (Auto) 0.4, Eosinophils # (Auto) 0.2, Basophils # (Auto) 0.1, 

Immature Granulocyte # (Auto) 0.0, Sodium Level 139, Potassium Level 3.2L, C

hloride Level 106, Carbon Dioxide Level 23, Anion Gap 10, Blood Urea Nitrogen 

12, Creatinine 0.52L, Estimat Glomerular Filtration Rate 108, BUN/Creatinine 

Ratio 23, Glucose Level 108H, Calcium Level 8.8, Corrected Calcium 10.0, Total 

Bilirubin 0.4, Aspartate Amino Transf (AST/SGOT) 20, Alanine Aminotransferase 

(ALT/SGPT) 9, Alkaline Phosphatase 79, Total Protein 6.1L, Albumin 2.5L


3/20/23 11:02: Glucometer 128H


3/20/23 15:29: Glucometer 117H


3/20/23 15:45: 





Microbiology


3/13/23 Gram Stain - Final, Complete


          


3/13/23 Wound Culture - Final, Complete


          Staphylococcus aureus


          Gram Pos Mixed Bacterial Teressa





Assessment/Plan/Dx


Assessment:





1. S4 Sacral pressure ulcer


2. H/o acute osteomyelitis of sacrum with exposed bone on exam


3. Immobility syndrome secondary to recent acute embolic CVA


4. Recent bacterial endocarditis (due to sacral osteo)


5. Severe PEM with recent 50# weight loss due to dysphagia


6. Anemia





Plan: 





1. Continue wound vac: WF to base wiht granulogoam atop at 125. Change twice 

weekly. 


2. Check ESR and CRP along wiht MRI. Should evidence of chronic refractory 

osteomyelitis be present, will discuss with in house surgeon and proceed as 

indicated


3. Defer to primary team


4. Defer to primary team


5. PEG now out. Vashe WTD bid dressings to former PEG site. Glucerna shakes tid 

po.


6. Defer to primary team











BROCK WANG MD            Mar 20, 2023 16:08

## 2023-03-20 NOTE — PHYSICAL THERAPY DAILY NOTE
PT Daily Note-Current


Subjective


Patient in bed pre tx, agrees to PT, has no complaints of pain.  Will be co-

treating with OT due to poor patient mobility, strength, endurance, severe 

debility, coordinate UE and LE during activity, safety and reduce risk of falls.





Pain


Section J - Health Conditions


1. Rarely or not at all


2. Occasionally


3. Frequently


4. Almost constantly


8. Unable to answer


Pain Effect on Sleep:  1


Pain Interference with Therapy:  1


Pain Interference w/Day-to-Day:  1





Appearance


Patient in therapy gym post tx, will continue with OT.





Mental Status


Patient Orientation:  Person, Place, Situation


wound vac





Transfers


SCALE: Activities may be completed with or without assistive devices.





6-Indepedent-patient completes the activity by him/herself with no assistance 

from a helper.


5-Set-up or Clean-up Assistance-helper sets up or cleans up; patient completes 

activity. [place] assists only prior to or  


    following the activity.


4-Supervision or Touching Assistance-helper provides verbal cues and/or 

touching/steadying and/or contact guard assistance as patient completes 

activity. Assistance may be provided   


    throughout the activity or intermittently.


3-Partial/Moderate Assistance-helper does LESS THAN HALF the effort. [place] 

lifts, holds or supports trunk or limbs, but provides less than half the effort.


2-Substantial/Maximal Assistance-helper does MORE THAN HALF the effort. [place] 

lifts or holds trunk or limbs and provides more than half the effort.


0-Iozjxarrh-vtgwbc does ALL the effort. Patient does none of the effort to 

complete the activity. Or, the assistance of 2 or more helpers is required for 

the patient to complete the  


    activity.


If activity was not attempted, code reason:


7-Patient Refused.


9-Not Applicable-not attempted and the patient did not perform the activity 

before the current illness, exacerbation or injury.


10-Not Attempted due to Environmental Limitations-(lack of equipment, weather 

restraints, etc.).


88-Not Attempted due to Medical Conditions or Safety Concerns.


Roll Left & Right (QC):  3


Lying to Sitting/Side of Bed(Q:  3


Sit to Stand (QC):  1


Chair/Bed-to-Chair Xfer(QC):  1


Min assist for supine to sit, patient changes gown and a sit to stand machine is

used to get patient to stand and transfer to , patient needs to have a BM and 

is transferred to a commode instead.  Patient has a bit of a BM, is cleaned and 

brief put on and transferred to WC.





Weight Bearing


Weight Bearing/Tolerated


Weight Bearing/Tolerated





Wheelchair Training


Does the Pt Use a Wheelchair?:  Yes


Wheel 50 ft with 2 turns (QC):  3


Type of Wheelchair:  Manual


100', min assist, patient uses all extremities but her right arm





Exercises


Patient  the parallel bars x3 with assist of 2 for 2 min, 1 min, 45 

sec., needs assist with positioning and knee blocking





Treatments


PT performed bed mobility and transfers, standing, WC mobility, positioning and 

safety during dressing and toileting, OT performed dressing, toileting, changing

brief, UE positioning and safety during activity, assist with transfers and 

standing.





Assessment


Current Status:  Poor Progress


slight improvement in supine to sit and WC mobility





PT Long Term Goals


Long Term Goals


PT Long Term Goals Time Frame:  May 8, 2023


Roll Left & Right (QC):  4


Sit to Lying (QC):  4


Lying-Sitting on Side/Bed(QC):  4


Sit to Stand (QC):  2


Chair/Bed-to-Chair Xfer(QC):  2


Toilet Transfer (QC):  2


Car Transfer (QC):  2


Does the Patient Walk:  No and Walking Goal NOT indicated


Walk 10 feet (QC):  1


Walk 50ft with 2 Turns (QC):  88


Walk 150 ft (QC):  88


Walking 10ft on Uneven Surface:  88


1 Step (curb) (QC):  88


4 Steps (QC):  88


12 Steps (QC):  88


Picking up an Object (QC):  88


Does the Pt use WC or Scooter?:  Yes


Wheel 50 feet with 2 turns (QC:  5 (Patient able to use (L) LE and (B) LE's for 

w/c propulsion)


Type:  Manual


Wheel 150 feet:  5


Type:  Manual





PT Plan


Problem List


Problem List:  Activity Tolerance, Functional Strength, Safety, Balance, Gait, 

Transfer, Bed Mobility, ROM





Treatment/Plan


Treatment Plan:  Continue Plan of Care


Treatment Plan:  Bed Mobility, Concurrent Therapy, Education, Functional 

Activity Russ, Functional Strength, Group Therapy, Safety, Therapeutic 

Exercise, Transfers, Other (W/C mobility)


Treatment Duration:  May 8, 2023


Frequency:  At least 5 of 7 days/Wk (IRF)


Estimated Hrs Per Day:  1.5 hours per day


Patient and/or Family Agrees t:  Yes





Safety Risks/Education


Patient Education:  Transfer Techniques, Correct Positioning, W/C Management, 

Safety Issues


Teaching Recipient:  Patient


Teaching Methods:  Demonstration, Discussion


Response to Teaching:  Reinforcement Needed





Time


Time In:  0800


Time Out:  0900


DATE:  Mar 20, 2023


Total Billed Treatment Time:  60


Total Billed Treatment


1 visit


FA 60'





co-treat from [id number]











MING HESS PT                Mar 20, 2023 09:00

## 2023-03-20 NOTE — DIAGNOSTIC IMAGING REPORT
Exam: MRI sacrum and sacroiliac joints without and with

intravenous contrast.



Date: March 20, 2023.



Indication: 57-year-old female, history of ulcer in the region of

the sacrum. Concern for osteomyelitis.



Comparison: None available.



Technique: Multiple dedicated pre and postcontrast MRI sequences

of the sacrum and sacroiliac joints were obtained.



Findings:



There is a skin ulcer posteriorly just to the left of midline

which does not clearly directly contact bone. There is adjacent

soft tissue enhancement. There is no identified T1 marrow signal

loss or aggressive bone destruction at this location.



There is abnormal marrow signal on both sides of the right

sacroiliac joint. There is also adjacent abnormal intramuscular

edema. There is a trace right sacroiliac joint effusion.



The left sacroiliac joint is unremarkable in appearance.



There is no identified large hip joint effusion.



There are additional areas of abnormal intramuscular edema also

present bilaterally.



There is severe disc height loss at L5-S1 and additional disc

degenerative changes at L4-L5.



Impression:

1. Abnormal marrow edema on both sides of the right sacroiliac

joint and trace right sacroiliac joint effusion which is

concerning for septic arthritis of the right sacroiliac joint and

adjacent osteomyelitis. A seronegative spinal arthropathy such as

psoriatic arthritis or Sourav's disease would be primary

differential diagnostic considerations.

2. Abnormal intramuscular edema fairly diffusely present although

most notable near the right sacral joint which does raise concern

for nonspecific myositis including infectious myositis.

3. Skin ulcer just to the left of midline which does not

definitely contact bone. There is no immediately adjacent

evidence of osteomyelitis at this specific location.



Dictated by: 



  Dictated on workstation # [id number]

## 2023-03-20 NOTE — PM&R PROGRESS NOTE
Subjective


HPI/CC On Admission


Date Seen by Provider:  Mar 20, 2023


Time Seen by Provider:  08:30


Subjective/Events-last exam


3/20/2023:


No major events


Improved transfers


Catheter removed but later could not void so replaced robles


Sugars monitored


Dr Walker consulted for wound








3/19/2023:


Much improved


Resting today


Family at bedside


No pain reported


Will DC catheter tomorrow








3/18/2023:


No major issues


DC tube feeding to prevent overfeeding


Dysarthria improved


Reviewed meds








3/17/2023:


Much improved


Participation is good


No falls


Pain is chronic neuropathy


Reinserted Robles catheter due to retention last night








3/16/2023:


Improved dramatically


Stood for 35 seconds with parallel bars


No pain reported except wound


Family at bedside








3/15/2023:


Improved overall


Family at bedside


Wound vac on hold due to bacteria in the wound culture


Dr Zamorano managing the IV abx 








3/14/2023:


Doing much better


Reviewed back history on her pre-existing debility:


10/5/22 gastric ulcer perforation but used wheelchair periodically prior to that

due to neuropathy


11/4/22 Decubitus ulcer admit stage III wound vac placed


1/29/23 used walker but wheelchair for long distances


Speech will see her today





Review of Systems


General:  Fatigue, Malaise


Neurological:  Weakness, Incoordination





Objective


Exam


Vital Signs





Vital Signs








  Date Time  Temp Pulse Resp B/P (MAP) Pulse Ox O2 Delivery O2 Flow Rate FiO2


 


3/20/23 21:00     95 Room Air  


 


3/20/23 19:43 36.5 92 20 120/66 (84)    


 


3/15/23 09:00       2.00 





Capillary Refill :


General Appearance:  No Apparent Distress, WD/WN, Anxious, Chronically ill


HEENT:  PERRL/EOMI, Normal ENT Inspection, Pharynx Normal


Neck:  Full Range of Motion, Normal Inspection, Non Tender, Supple, Carotid Brui

t


Respiratory:  Chest Non Tender, Lungs Clear, Normal Breath Sounds, No Accessory 

Muscle Use, No Respiratory Distress


Cardiovascular:  Regular Rate, Rhythm, No Edema, No Gallop, No JVD, No Murmur, 

Normal Peripheral Pulses


Gastrointestinal:  Normal Bowel Sounds, No Organomegaly, No Pulsatile Mass, Non 

Tender, Soft


Back:  Normal Inspection, No CVA Tenderness, No Vertebral Tenderness


Extremity:  Normal Capillary Refill, Normal Inspection, Normal Range of Motion, 

Non Tender, No Calf Tenderness, No Pedal Edema


Neurologic/Psychiatric:  Alert, Oriented x3, CNs II-XII Norm as Tested, Abnormal

CNs II-XII, Depressed Affect, Facial Droop, Motor Weakness


Skin:  Normal Color, Warm/Dry


Lymphatic:  No Adenopathy





Results/Procedures


Lab


Laboratory Tests


3/20/23 05:40








Patient resulted labs reviewed.





FIM


Transfers


Therapy Code Descriptions/Definitions 





Functional [place] Measure:


0=Not Assessed/NA        4=Minimal Assistance


1=Total Assistance        5=Supervision or Setup


2=Maximal Assistance  6=Modified [place]


3=Moderate Assistance 7=Complete IndependenceSCALE: Activities may be completed 

with or without assistive devices.





6-Indepedent-patient completes the activity by him/herself with no assistance 

from a helper.


5-Set-up or Clean-up Assistance-helper sets up or cleans up; patient completes 

activity. [place] assists only prior to or  


    following the activity.


4-Supervision or Touching Assistance-helper provides verbal cues and/or 

touching/steadying and/or contact guard assistance as patient completes 

activity. Assistance may be provided   


    throughout the activity or intermittently.


3-Partial/Moderate Assistance-helper does LESS THAN HALF the effort. [place] 

lifts, holds or supports trunk or limbs, but provides less than half the effort.


2-Substantial/Maximal Assistance-helper does MORE THAN HALF the effort. [place] 

lifts or holds trunk or limbs and provides more than half the effort.


3-Xtwtejffr-manzcq does ALL the effort. Patient does none of the effort to 

complete the activity. Or, the assistance of 2 or more helpers is required for 

the patient to complete the  


    activity.


If activity was not attempted, code reason:


7-Patient Refused.


9-Not Applicable-not attempted and the patient did not perform the activity 

before the current illness, exacerbation or injury.


10-Not Attempted due to Environmental Limitations-(lack of equipment, weather 

restraints, etc.).


88-Not Attempted due to Medical Conditions or Safety Concerns.


Roll Left to Right (QC):  3


Sit to Lying (QC):  3


Sit to Stand (QC):  1


Chair/Bed-to-Chair Xfer(QC):  1


Car Transfer (QC):  88





Gait Training


Does the Patient Walk?:  No and Walking Goal NOT indicated


Walk 10 feet (QC):  88


Walk 50 ft with 2 Turns(QC):  88


Walk 150 ft (QC):  88


Walking 10ft/uneven surface-QC:  88





Wheelchair Training


Does the Pt Use a Wheelchair?:  Yes


Wheel 50 ft with 2 turns (QC):  2


Wheel 150 ft (QC):  2


Type of Wheelchair:  Manual





Stair Training


1 Step (curb) (QC):  88


4 Steps (QC):  88


12 Steps (QC):  88





Balance


Picking up an Object (QC):  88





ADL-Treatment


Eating (QC):  5


Oral Hygiene (QC):  6


Shower/Bathe Self (QC):  3


Upper Body Dressing (QC):  3 (mod A)


Lower Body Dressing (QC):  1


On/Off Footwear (QC):  1


Toileting Hygiene (QC):  1


Toilet Transfer (QC):  1





Assessment/Plan


Assessment and Plan


Assess & Plan/Chief Complaint


Assessment:


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


Infective Endocarditis of Atrial Valve


Dysarthria/expressive aphasia


Dysphagia with PEG tube


Right Knee Effusion 


Sacral Decubitus Ulcer with MRSA so started Vanc


Anemia-iron def so ordered Venofer


T2DM - ID


Hypothyroidism


Constipation


Hyperlipidemia


GERD


Chronic debility: (10/5/22 gastric ulcer perforation but used wheelchair 

periodically prior to that due to neuropathy, 11/4/22 Decubitus ulcer admit 

stage III wound vac placed, 1/29/23 used walker but wheelchair for long 

distances)


Urinary retention attempted to DC catheter 3/16/23 and required replacement of 

cath 3/17/23





Plan:


Monitor closely


Change Synthroid to PO


ST consult for dysphagia


Utilize PEG


PT OT





3/14/2023:


Monitor closely


Speech therapy to advanced diet if able





3/15/2023:


Monitor closely


Wound care





3/16/2023:


Dramatically improved


Wound care


IV abx





3/17/2023:


Urecholine


Monitor closely





3/18/2023:


DC tube feeding





3/19/2023:


DC catheter tomorrow








3/20/2023:


Wound care


Robles cath replaced





(1) CVA (cerebral vascular accident)











COOKIE CHRISTIE DO                Mar 20, 2023 04:52

## 2023-03-20 NOTE — OCCUPATIONAL THER DAILY NOTE
OT Current Status-Daily Note


Subjective


Pt alert, sitting EOB with PT in room.  Pt agrees to therapy.  No c/o pain.  Co-

treat with PT [id number], skills of 2 clinicians required to decrease fall risk, 

increase strength and stamina to complete standing and functional tasks.  PT 

focusing on transfers, standing and B LE strengthening while OT focusing on 

ADLs, functional mobility and managing R UE during all tasks/mobility.





Mental Status/Objective


Patient Orientation:  Person, Place, Non-Verbal/Aphasic, Time, Situation


Attachments:  Drains (wound vac), IV





ADL-Treatment


Pt uses sit to stand lift to transfer from surface to surface.  Pt incontinent 

of bowel and requires assistance for hygiene.  Pt placed on BSC though unable to

finish BM.  Hygiene completed, brief placed.  Pt using L UE/LE to propel w/c 

with mod A for steering.  Pt is going from sit to stand with mod A x2 then 

requiring max A to stay in standing with verbal cues for B UE placement and 

alignment of body.  Took over care of pt from PT at 0900.  Pt then completed B 

UE dowel kristofer exercise, dowel placed across //bars with B hands gripping to 

push/pull dowel gravity eliminated 10x's then 5x's with only R hand holding 

dowel and CGA to maintain R  while push/pull dowel.  Pt then completed oral 

care sitting at sink, with verbal cues for one handed technique.  Pt transferred

to bed using sit to stand lift.  With cues pt able to roll toward L side to 

allow hygiene after bowel incontinence.  Pt left in care of nrsg.  All needs 

met.  Call light/phone in reach.


Therapy Code Descriptions/Definitions 





Functional [place] Measure:


0=Not Assessed/NA        4=Minimal Assistance


1=Total Assistance        5=Supervision or Setup


2=Maximal Assistance  6=Modified [place]


3=Moderate Assistance 7=Complete IndependenceSCALE: Activities may be completed 

with or without assistive devices.





6-Indepedent-patient completes the activity by him/herself with no assistance 

from a helper.


5-Set-up or Clean-up Assistance-helper sets up or cleans up; patient completes 

activity. [place] assists only prior to or  


    following the activity.


4-Supervision or Touching Assistance-helper provides verbal cues and/or 

touching/steadying and/or contact guard assistance as patient completes 

activity. Assistance may be provided   


    throughout the activity or intermittently.


3-Partial/Moderate Assistance-helper does LESS THAN HALF the effort. [place] 

lifts, holds or supports trunk or limbs, but provides less than half the effort.


2-Substantial/Maximal Assistance-helper does MORE THAN HALF the effort. [place] 

lifts or holds trunk or limbs and provides more than half the effort.


1-Wnmfrcvcm-tfoibg does ALL the effort. Patient does none of the effort to 

complete the activity. Or, the assistance of 2 or more helpers is required for 

the patient to complete the  


    activity.


If activity was not attempted, code reason:


7-Patient Refused.


9-Not Applicable-not attempted and the patient did not perform the activity 

before the current illness, exacerbation or injury.


10-Not Attempted due to Environmental Limitations-(lack of equipment, weather 

restraints, etc.).


88-Not Attempted due to Medical Conditions or Safety Concerns.


Oral Hygiene (QC):  4


Upper Body Dressing (QC):  3 (mod A)


Lower Body Dressing (QC):  1


On/Off Footwear:  1


Toileting Hygiene (QC):  1


Toilet Transfer (QC):  1


Wound vac lost suction.  Wound nurse notified and came to look at site.





OT Short Term Goals


Short Term Goals


Time Frame:  Mar 28, 2023


Eating:  3


Toileting hygiene:  3


Shower/bathe self:  3


Lower body dressing:  3


Putting on/taking off footwear:  3





OT Long Term Goals


Long Term Goals


Time Frame:  2023


Acute change in mental status:  1


Inattention:  0


Disorganized thinkin


Altered level of consciousness:  0


Eating (QC):  5


Oral Hygiene (QC):  5


Toileting Hygiene (QC):  4


Shower/Bathe Self (QC):  4


Upper Body Dressing (QC):  5


Lower Body Dressing (QC):  4


On/Off Footwear (QC):  4


Additional Goals:  1-Demonstrate ADL Tasks, 2-Verbalize Understanding, 3-

ImproveStrength/Russ


1=Demonstrate adherence to instructed precautions during ADL tasks.


2=Patient will verbalize/demonstrate understanding of assistive 

devices/modifications for ADL.


3=Patient will improve strength/tolerance for activity to enable patient to 

perform ADL's.





OT Education/Plan


Problem List/Assessment


Assessment:  Decreased Activ Tolerance, Decreased UE Strength, Dependent 

Transfers, Impaired Coordination, Impaired Funct Balance, Impaired Self-Care 

Skills, Restricted Funct UE ROM





Discharge Recommendations


Plan/Recommendations:  Continue POC





Treatment Plan/Plan of Care


Patient would benefit from OT for education, treatment and training to promote 

independence in ADL's, mobility, safety and/or upper extremity function for 

ADL's.


Plan of Care:  ADL Retraining, Functional Mobility, Group Exercise/Act as Ind, 

UE Funct Exercise/Act, UE Neuromus Re-Ed/Coord, Visual/Perceptual Retrain, W/C 

Management Training


Treatment Duration:  2023


Frequency:  At least 5 of 7 days/Wk (IRF)


Estimated Hrs Per Day:  1.5 hours per day


Agreement:  Yes


Rehab Potential:  Fair





Time


Start Time:  08:10


Stop Time:  09:30


DATE:  Mar 20, 2023


Total Time Billed (hr/min):  80


Billed Treatment Time


1 visit-ADL 2 (30 min)  FA 3 (50 min)  co-treat PT [id number]  individual 900-

0930











ARTIE MC               Mar 20, 2023 11:57

## 2023-03-20 NOTE — PHYSICAL THERAPY DAILY NOTE
PT Daily Note-Current


Subjective


Patient in bed pre tx, agrees to PT, has no complaints of pain.





Pain


Section J - Health Conditions


1. Rarely or not at all


2. Occasionally


3. Frequently


4. Almost constantly


8. Unable to answer


Pain Effect on Sleep:  1


Pain Interference with Therapy:  1


Pain Interference w/Day-to-Day:  1





Appearance


Patient in bed post tx with nurse call, phone, tray, all needs met.





Mental Status


Patient Orientation:  Person, Place, Situation





Transfers


SCALE: Activities may be completed with or without assistive devices.





6-Indepedent-patient completes the activity by him/herself with no assistance 

from a helper.


5-Set-up or Clean-up Assistance-helper sets up or cleans up; patient completes 

activity. [place] assists only prior to or  


    following the activity.


4-Supervision or Touching Assistance-helper provides verbal cues and/or 

touching/steadying and/or contact guard assistance as patient completes 

activity. Assistance may be provided   


    throughout the activity or intermittently.


3-Partial/Moderate Assistance-helper does LESS THAN HALF the effort. [place] 

lifts, holds or supports trunk or limbs, but provides less than half the effort.


2-Substantial/Maximal Assistance-helper does MORE THAN HALF the effort. [place] 

lifts or holds trunk or limbs and provides more than half the effort.


2-Nxbhctoin-hlejof does ALL the effort. Patient does none of the effort to 

complete the activity. Or, the assistance of 2 or more helpers is required for 

the patient to complete the  


    activity.


If activity was not attempted, code reason:


7-Patient Refused.


9-Not Applicable-not attempted and the patient did not perform the activity 

before the current illness, exacerbation or injury.


10-Not Attempted due to Environmental Limitations-(lack of equipment, weather 

restraints, etc.).


88-Not Attempted due to Medical Conditions or Safety Concerns.





Weight Bearing


Weight Bearing/Tolerated


Weight Bearing/Tolerated





Exercises


Supine Ex:  Ankle pumps, Quad Set, Glut sets, Heel Slides (AAROM RLE), Short Arc

Quads, Straight leg raise (AAROM RLE), Hip abd/add (AAROM RLE)


Supine Reps:  20 (BLE)





Treatments


LE strengthening/ROM





Assessment


Current Status:  Fair Progress


Improving AROM of the RLE but patient still has motor planning impairments and 

performs exercises very slowly, needs cues for continual direction on how to 

perform exercises on the right side.





PT Long Term Goals


Long Term Goals


PT Long Term Goals Time Frame:  May 8, 2023


Roll Left & Right (QC):  4


Sit to Lying (QC):  4


Lying-Sitting on Side/Bed(QC):  4


Sit to Stand (QC):  2


Chair/Bed-to-Chair Xfer(QC):  2


Toilet Transfer (QC):  2


Car Transfer (QC):  2


Does the Patient Walk:  No and Walking Goal NOT indicated


Walk 10 feet (QC):  1


Walk 50ft with 2 Turns (QC):  88


Walk 150 ft (QC):  88


Walking 10ft on Uneven Surface:  88


1 Step (curb) (QC):  88


4 Steps (QC):  88


12 Steps (QC):  88


Picking up an Object (QC):  88


Does the Pt use WC or Scooter?:  Yes


Wheel 50 feet with 2 turns (QC:  5 (Patient able to use (L) LE and (B) LE's for 

w/c propulsion)


Type:  Manual


Wheel 150 feet:  5


Type:  Manual





PT Plan


Problem List


Problem List:  Activity Tolerance, Functional Strength, Safety, Balance, Gait, 

Transfer, Bed Mobility, ROM





Treatment/Plan


Treatment Plan:  Continue Plan of Care


Treatment Plan:  Bed Mobility, Concurrent Therapy, Education, Functional 

Activity Russ, Functional Strength, Group Therapy, Safety, Therapeutic 

Exercise, Transfers, Other (W/C mobility)


Treatment Duration:  May 8, 2023


Frequency:  At least 5 of 7 days/Wk (IRF)


Estimated Hrs Per Day:  1.5 hours per day


Patient and/or Family Agrees t:  Yes





Safety Risks/Education


Patient Education:  Correct Positioning, Safety Issues


Teaching Recipient:  Patient


Teaching Methods:  Demonstration, Discussion


Response to Teaching:  Reinforcement Needed


Patient positioned on left side with pillow support for pressure relief post tx.





Time


Time In:  1100


Time Out:  1130


DATE:  Mar 20, 2023


Total Billed Treatment Time:  30


Total Billed Treatment


1 visit


EX 30'











MING HESS PT                Mar 20, 2023 11:31

## 2023-03-20 NOTE — OCCUPATIONAL THER DAILY NOTE
OT Current Status-Daily Note


Subjective


Pt alert, lying in bed.  Pt agrees to therapy.  No c/o pain at this time.  

Family and wound care nrsg in room.





Mental Status/Objective


Patient Orientation:  Person, Place, Time, Situation


Attachments:  Drains (wound vac), IV





ADL-Treatment


Therapy Code Descriptions/Definitions 





Functional [place] Measure:


0=Not Assessed/NA        4=Minimal Assistance


1=Total Assistance        5=Supervision or Setup


2=Maximal Assistance  6=Modified [place]


3=Moderate Assistance 7=Complete IndependenceSCALE: Activities may be completed 

with or without assistive devices.





6-Indepedent-patient completes the activity by him/herself with no assistance 

from a helper.


5-Set-up or Clean-up Assistance-helper sets up or cleans up; patient completes 

activity. [place] assists only prior to or  


    following the activity.


4-Supervision or Touching Assistance-helper provides verbal cues and/or 

touching/steadying and/or contact guard assistance as patient completes 

activity. Assistance may be provided   


    throughout the activity or intermittently.


3-Partial/Moderate Assistance-helper does LESS THAN HALF the effort. [place] 

lifts, holds or supports trunk or limbs, but provides less than half the effort.


2-Substantial/Maximal Assistance-helper does MORE THAN HALF the effort. [place] 

lifts or holds trunk or limbs and provides more than half the effort.


8-Sukbkyiyk-fegxzx does ALL the effort. Patient does none of the effort to 

complete the activity. Or, the assistance of 2 or more helpers is required for 

the patient to complete the  


    activity.


If activity was not attempted, code reason:


7-Patient Refused.


9-Not Applicable-not attempted and the patient did not perform the activity 

before the current illness, exacerbation or injury.


10-Not Attempted due to Environmental Limitations-(lack of equipment, weather 

restraints, etc.).


88-Not Attempted due to Medical Conditions or Safety Concerns.





Other Treatment


Pt demonstrated ability with verbal/physical cues to roll towards R side and 

stay without assistance.  Pt incontinent of bowel and requires assist for 

hygiene and clothing manipulation.  Assist given to wound care during wound vac 

change.  After session, pt left in care of radiology.  All needs met.





OT Short Term Goals


Short Term Goals


Time Frame:  Mar 28, 2023


Eating:  3


Toileting hygiene:  3


Shower/bathe self:  3


Lower body dressing:  3


Putting on/taking off footwear:  3





OT Long Term Goals


Long Term Goals


Time Frame:  2023


Acute change in mental status:  1


Inattention:  0


Disorganized thinkin


Altered level of consciousness:  0


Eating (QC):  5


Oral Hygiene (QC):  5


Toileting Hygiene (QC):  4


Shower/Bathe Self (QC):  4


Upper Body Dressing (QC):  5


Lower Body Dressing (QC):  4


On/Off Footwear (QC):  4


Additional Goals:  1-Demonstrate ADL Tasks, 2-Verbalize Understanding, 3-

ImproveStrength/Russ


1=Demonstrate adherence to instructed precautions during ADL tasks.


2=Patient will verbalize/demonstrate understanding of assistive 

devices/modifications for ADL.


3=Patient will improve strength/tolerance for activity to enable patient to 

perform ADL's.





OT Education/Plan


Problem List/Assessment


Assessment:  Decreased Activ Tolerance, Decreased UE Strength, Dependent 

Transfers, Impaired Bed Mobility, Impaired Self-Care Skills, Restricted Funct UE

ROM





Discharge Recommendations


Plan/Recommendations:  Continue POC





Treatment Plan/Plan of Care


Patient would benefit from OT for education, treatment and training to promote 

independence in ADL's, mobility, safety and/or upper extremity function for 

ADL's.


Plan of Care:  ADL Retraining, Functional Mobility, Group Exercise/Act as Ind, 

UE Funct Exercise/Act, UE Neuromus Re-Ed/Coord, Visual/Perceptual Retrain, W/C 

Management Training


Treatment Duration:  2023


Frequency:  At least 5 of 7 days/Wk (IRF)


Estimated Hrs Per Day:  1.5 hours per day


Agreement:  Yes


Rehab Potential:  Fair





Time


Start Time:  13:20


Stop Time:  13:50


DATE:  Mar 20, 2023


Total Time Billed (hr/min):  30


Billed Treatment Time


1 visit-FA 2 (30 min)











ARTIE MC               Mar 20, 2023 14:59

## 2023-03-21 VITALS — SYSTOLIC BLOOD PRESSURE: 108 MMHG | DIASTOLIC BLOOD PRESSURE: 60 MMHG

## 2023-03-21 VITALS — SYSTOLIC BLOOD PRESSURE: 102 MMHG | DIASTOLIC BLOOD PRESSURE: 58 MMHG

## 2023-03-21 VITALS — SYSTOLIC BLOOD PRESSURE: 132 MMHG | DIASTOLIC BLOOD PRESSURE: 63 MMHG

## 2023-03-21 RX ADMIN — SODIUM BICARBONATE SCH ML: 84 INJECTION, SOLUTION INTRAVENOUS at 06:12

## 2023-03-21 RX ADMIN — PREGABALIN SCH MG: 75 CAPSULE ORAL at 09:28

## 2023-03-21 RX ADMIN — LEVOTHYROXINE SODIUM SCH MCG: 100 TABLET ORAL at 06:12

## 2023-03-21 RX ADMIN — NYSTATIN SCH GM: 100000 CREAM TOPICAL at 09:44

## 2023-03-21 RX ADMIN — DOCUSATE SODIUM SCH MG: 100 CAPSULE ORAL at 09:30

## 2023-03-21 RX ADMIN — FOLIC ACID SCH MG: 1 TABLET ORAL at 09:29

## 2023-03-21 RX ADMIN — SUCRALFATE SCH GM: 1 TABLET ORAL at 22:20

## 2023-03-21 RX ADMIN — BETHANECHOL CHLORIDE SCH MG: 25 TABLET ORAL at 22:20

## 2023-03-21 RX ADMIN — SUCRALFATE SCH GM: 1 TABLET ORAL at 06:12

## 2023-03-21 RX ADMIN — BETHANECHOL CHLORIDE SCH MG: 25 TABLET ORAL at 11:36

## 2023-03-21 RX ADMIN — BETHANECHOL CHLORIDE SCH MG: 25 TABLET ORAL at 17:11

## 2023-03-21 RX ADMIN — ENOXAPARIN SODIUM SCH MG: 100 INJECTION SUBCUTANEOUS at 17:13

## 2023-03-21 RX ADMIN — NYSTATIN SCH GM: 100000 CREAM TOPICAL at 21:00

## 2023-03-21 RX ADMIN — Medication SCH MCG: at 06:12

## 2023-03-21 RX ADMIN — MELATONIN 3 MG ORAL TABLET SCH MG: 3 TABLET ORAL at 22:20

## 2023-03-21 RX ADMIN — MICONAZOLE NITRATE SCH APPLIC: 20 POWDER TOPICAL at 22:31

## 2023-03-21 RX ADMIN — PREGABALIN SCH MG: 75 CAPSULE ORAL at 22:20

## 2023-03-21 RX ADMIN — VANCOMYCIN SCH MLS/HR: 1.5 INJECTION, SOLUTION INTRAVENOUS at 11:44

## 2023-03-21 RX ADMIN — FAMOTIDINE SCH MG: 20 TABLET, FILM COATED ORAL at 09:28

## 2023-03-21 RX ADMIN — MICONAZOLE NITRATE SCH APPLIC: 20 POWDER TOPICAL at 09:43

## 2023-03-21 RX ADMIN — SUCRALFATE SCH GM: 1 TABLET ORAL at 11:36

## 2023-03-21 RX ADMIN — FAMOTIDINE SCH MG: 20 TABLET, FILM COATED ORAL at 22:20

## 2023-03-21 RX ADMIN — POTASSIUM CHLORIDE SCH MEQ: 750 TABLET, FILM COATED, EXTENDED RELEASE ORAL at 17:12

## 2023-03-21 RX ADMIN — POLYETHYLENE GLYCOL (3350) SCH GM: 17 POWDER, FOR SOLUTION ORAL at 09:30

## 2023-03-21 RX ADMIN — DOCUSATE SODIUM AND SENNOSIDES SCH EA: 8.6; 5 TABLET, FILM COATED ORAL at 09:30

## 2023-03-21 RX ADMIN — INSULIN ASPART SCH UNIT: 100 INJECTION, SOLUTION INTRAVENOUS; SUBCUTANEOUS at 16:01

## 2023-03-21 RX ADMIN — DOCUSATE SODIUM SCH MG: 100 CAPSULE ORAL at 21:00

## 2023-03-21 RX ADMIN — BETHANECHOL CHLORIDE SCH MG: 25 TABLET ORAL at 06:12

## 2023-03-21 RX ADMIN — POLYETHYLENE GLYCOL (3350) SCH GM: 17 POWDER, FOR SOLUTION ORAL at 21:00

## 2023-03-21 RX ADMIN — Medication SCH EACH: at 22:20

## 2023-03-21 RX ADMIN — Medication SCH EACH: at 09:28

## 2023-03-21 RX ADMIN — SUCRALFATE SCH GM: 1 TABLET ORAL at 17:12

## 2023-03-21 RX ADMIN — NYSTATIN SCH GM: 100000 CREAM TOPICAL at 12:51

## 2023-03-21 RX ADMIN — LIDOCAINE SCH EA: 50 PATCH CUTANEOUS at 09:28

## 2023-03-21 RX ADMIN — INSULIN ASPART SCH UNIT: 100 INJECTION, SOLUTION INTRAVENOUS; SUBCUTANEOUS at 06:00

## 2023-03-21 RX ADMIN — DOCUSATE SODIUM AND SENNOSIDES SCH EA: 8.6; 5 TABLET, FILM COATED ORAL at 21:00

## 2023-03-21 RX ADMIN — BISACODYL PRN MG: 10 SUPPOSITORY RECTAL at 09:30

## 2023-03-21 RX ADMIN — INSULIN ASPART SCH UNIT: 100 INJECTION, SOLUTION INTRAVENOUS; SUBCUTANEOUS at 21:00

## 2023-03-21 RX ADMIN — INSULIN ASPART SCH UNIT: 100 INJECTION, SOLUTION INTRAVENOUS; SUBCUTANEOUS at 11:59

## 2023-03-21 RX ADMIN — POTASSIUM CHLORIDE SCH MEQ: 750 TABLET, FILM COATED, EXTENDED RELEASE ORAL at 09:29

## 2023-03-21 RX ADMIN — AMITRIPTYLINE HYDROCHLORIDE SCH MG: 25 TABLET, FILM COATED ORAL at 22:20

## 2023-03-21 NOTE — PHYSICAL THERAPY DAILY NOTE
PT Daily Note-Current


Subjective


Patient in bed pre tx, agrees to PT, has no complaints of pain.  Will be co-

treating with OT due to poor patient mobility, strength, endurance, severe 

debility, coordinate UE and LE during activity, safety and reduce risk of falls.





Pain


Section J - Health Conditions


1. Rarely or not at all


2. Occasionally


3. Frequently


4. Almost constantly


8. Unable to answer


Pain Effect on Sleep:  1


Pain Interference with Therapy:  1


Pain Interference w/Day-to-Day:  1





Appearance


Patient in bed post tx with nurse call, phone, tray, all needs met.





Mental Status


Patient Orientation:  Person, Place, Situation


Attachments:  [place] Catheter


wound vac





Transfers


SCALE: Activities may be completed with or without assistive devices.





6-Indepedent-patient completes the activity by him/herself with no assistance 

from a helper.


5-Set-up or Clean-up Assistance-helper sets up or cleans up; patient completes 

activity. [place] assists only prior to or  


    following the activity.


4-Supervision or Touching Assistance-helper provides verbal cues and/or 

touching/steadying and/or contact guard assistance as patient completes 

activity. Assistance may be provided   


    throughout the activity or intermittently.


3-Partial/Moderate Assistance-helper does LESS THAN HALF the effort. [place] 

lifts, holds or supports trunk or limbs, but provides less than half the effort.


2-Substantial/Maximal Assistance-helper does MORE THAN HALF the effort. [place] 

lifts or holds trunk or limbs and provides more than half the effort.


3-Lfmggxssc-iaxlbt does ALL the effort. Patient does none of the effort to 

complete the activity. Or, the assistance of 2 or more helpers is required for 

the patient to complete the  


    activity.


If activity was not attempted, code reason:


7-Patient Refused.


9-Not Applicable-not attempted and the patient did not perform the activity 

before the current illness, exacerbation or injury.


10-Not Attempted due to Environmental Limitations-(lack of equipment, weather 

restraints, etc.).


88-Not Attempted due to Medical Conditions or Safety Concerns.


Roll Left & Right (QC):  2


Sit to Lying (QC):  2


Lying to Sitting/Side of Bed(Q:  2


Sit to Stand (QC):  1


Chair/Bed-to-Chair Xfer(QC):  1


Patient rolls from side to side to get brief on, max assist for supine to sit, 

patient then transfers to WC (via sit to stand machine).  After getting back to 

her room at the end of tx patient stands with sit to stand machine and it is 

discovered that she has had a BM, brief is removed and she is cleaned and then 

transferred to bed, max assist for sit to supine, after laying down she is roll

ed again from side to side for a little more cleaning.





Weight Bearing


Weight Bearing/Tolerated


Weight Bearing/Tolerated





Wheelchair Training


Does the Pt Use a Wheelchair?:  Yes


Wheel 50 ft with 2 turns (QC):  4


Wheel 150 ft (QC):  4


Type of Wheelchair:  Manual


SBA, very slow, uses left arm and leg, drifts to the right, needs extra time and

cues for direction





Exercises


Patient stood in the parallel bars x3 with assist of 2, only stood for about 30 

sec each time, more assist needed to stand





Treatments


PT performed bed mobility and transfers, standing, WC mobility, OT performed 

cleaning and changing brief, UE positioning and safety during activity, assist 

with transfers and standing.





Assessment


Current Status:  Poor Progress


Patient seems to have declined in function, she leans more to the right side 

today, right side seems weaker, patient continues to have impaired motor 

planning even in her left leg.





PT Long Term Goals


Long Term Goals


PT Long Term Goals Time Frame:  May 8, 2023


Roll Left & Right (QC):  4


Sit to Lying (QC):  4


Lying-Sitting on Side/Bed(QC):  4


Sit to Stand (QC):  2


Chair/Bed-to-Chair Xfer(QC):  2


Toilet Transfer (QC):  2


Car Transfer (QC):  2


Does the Patient Walk:  No and Walking Goal NOT indicated


Walk 10 feet (QC):  1


Walk 50ft with 2 Turns (QC):  88


Walk 150 ft (QC):  88


Walking 10ft on Uneven Surface:  88


1 Step (curb) (QC):  88


4 Steps (QC):  88


12 Steps (QC):  88


Picking up an Object (QC):  88


Does the Pt use WC or Scooter?:  Yes


Wheel 50 feet with 2 turns (QC:  5 (Patient able to use (L) LE and (B) LE's for 

w/c propulsion)


Type:  Manual


Wheel 150 feet:  5


Type:  Manual





PT Plan


Problem List


Problem List:  Activity Tolerance, Functional Strength, Safety, Balance, Gait, 

Transfer, Bed Mobility, ROM





Treatment/Plan


Treatment Plan:  Continue Plan of Care


Treatment Plan:  Bed Mobility, Concurrent Therapy, Education, Functional 

Activity Russ, Functional Strength, Group Therapy, Safety, Therapeutic 

Exercise, Transfers, Other (W/C mobility)


Treatment Duration:  May 8, 2023


Frequency:  At least 5 of 7 days/Wk (IRF)


Estimated Hrs Per Day:  1.5 hours per day


Patient and/or Family Agrees t:  Yes





Safety Risks/Education


Patient Education:  Transfer Techniques, Correct Positioning, W/C Management, 

Safety Issues


Teaching Recipient:  Patient


Teaching Methods:  Demonstration, Discussion


Response to Teaching:  Reinforcement Needed





Time


Time In:  0800


Time Out:  0915


DATE:  Mar 21, 2023


Total Billed Treatment Time:  75


Total Billed Treatment


1 visit


FA 75'





co-treated from [id number]











MING HESS PT                Mar 21, 2023 09:27

## 2023-03-21 NOTE — PM&R PROGRESS NOTE
Subjective


HPI/CC On Admission


Date Seen by Provider:  Mar 21, 2023


Time Seen by Provider:  08:30


Subjective/Events-last exam


3/21/2023:


No major events


Pain controlled


Dr Walker called me to update me on the MRI findings


No otseo noted under the wound








3/20/2023:


No major events


Improved transfers


Catheter removed but later could not void so replaced robles


Sugars monitored


Dr Walker consulted for wound








3/19/2023:


Much improved


Resting today


Family at bedside


No pain reported


Will DC catheter tomorrow








3/18/2023:


No major issues


DC tube feeding to prevent overfeeding


Dysarthria improved


Reviewed meds








3/17/2023:


Much improved


Participation is good


No falls


Pain is chronic neuropathy


Reinserted Robles catheter due to retention last night








3/16/2023:


Improved dramatically


Stood for 35 seconds with parallel bars


No pain reported except wound


Family at bedside








3/15/2023:


Improved overall


Family at bedside


Wound vac on hold due to bacteria in the wound culture


Dr Zamorano managing the IV abx 








3/14/2023:


Doing much better


Reviewed back history on her pre-existing debility:


10/5/22 gastric ulcer perforation but used wheelchair periodically prior to that

due to neuropathy


11/4/22 Decubitus ulcer admit stage III wound vac placed


1/29/23 used walker but wheelchair for long distances


Speech will see her today





Review of Systems


General:  Fatigue, Malaise


Genitourinary:  Retention


Neurological:  Weakness, Incoordination





Objective


Exam


Vital Signs





Vital Signs








  Date Time  Temp Pulse Resp B/P (MAP) Pulse Ox O2 Delivery O2 Flow Rate FiO2


 


3/21/23 22:00     92 Room Air  


 


3/21/23 21:57 36.0 94 20 102/58 (73)    





Capillary Refill :


General Appearance:  No Apparent Distress, WD/WN, Anxious, Chronically ill


HEENT:  PERRL/EOMI, Normal ENT Inspection, Pharynx Normal


Neck:  Full Range of Motion, Normal Inspection, Non Tender, Supple, Carotid 

Bruit


Respiratory:  Chest Non Tender, Lungs Clear, Normal Breath Sounds, No Accessory 

Muscle Use, No Respiratory Distress


Cardiovascular:  Regular Rate, Rhythm, No Edema, No Gallop, No JVD, No Murmur, 

Normal Peripheral Pulses


Gastrointestinal:  Normal Bowel Sounds, No Organomegaly, No Pulsatile Mass, Non 

Tender, Soft


Back:  Normal Inspection, No CVA Tenderness, No Vertebral Tenderness


Extremity:  Normal Capillary Refill, Normal Inspection, Normal Range of Motion, 

Non Tender, No Calf Tenderness, No Pedal Edema


Neurologic/Psychiatric:  Alert, Oriented x3, CNs II-XII Norm as Tested, Abnormal

CNs II-XII, Depressed Affect, Facial Droop, Motor Weakness


Skin:  Normal Color, Warm/Dry


Lymphatic:  No Adenopathy





Results/Procedures


Lab


Patient resulted labs reviewed.





FIM


Transfers


Therapy Code Descriptions/Definitions 





Functional [place] Measure:


0=Not Assessed/NA        4=Minimal Assistance


1=Total Assistance        5=Supervision or Setup


2=Maximal Assistance  6=Modified [place]


3=Moderate Assistance 7=Complete IndependenceSCALE: Activities may be completed 

with or without assistive devices.





6-Indepedent-patient completes the activity by him/herself with no assistance 

from a helper.


5-Set-up or Clean-up Assistance-helper sets up or cleans up; patient completes 

activity. [place] assists only prior to or  


    following the activity.


4-Supervision or Touching Assistance-helper provides verbal cues and/or 

touching/steadying and/or contact guard assistance as patient completes 

activity. Assistance may be provided   


    throughout the activity or intermittently.


3-Partial/Moderate Assistance-helper does LESS THAN HALF the effort. [place] 

lifts, holds or supports trunk or limbs, but provides less than half the effort.


2-Substantial/Maximal Assistance-helper does MORE THAN HALF the effort. [place] 

lifts or holds trunk or limbs and provides more than half the effort.


4-Jyqpbafym-ddnfkx does ALL the effort. Patient does none of the effort to 

complete the activity. Or, the assistance of 2 or more helpers is required for 

the patient to complete the  


    activity.


If activity was not attempted, code reason:


7-Patient Refused.


9-Not Applicable-not attempted and the patient did not perform the activity 

before the current illness, exacerbation or injury.


10-Not Attempted due to Environmental Limitations-(lack of equipment, weather 

restraints, etc.).


88-Not Attempted due to Medical Conditions or Safety Concerns.


Roll Left to Right (QC):  3


Sit to Lying (QC):  3


Sit to Stand (QC):  1


Chair/Bed-to-Chair Xfer(QC):  1


Car Transfer (QC):  88





Gait Training


Does the Patient Walk?:  No and Walking Goal NOT indicated


Walk 10 feet (QC):  88


Walk 50 ft with 2 Turns(QC):  88


Walk 150 ft (QC):  88


Walking 10ft/uneven surface-QC:  88





Wheelchair Training


Does the Pt Use a Wheelchair?:  Yes


Wheel 50 ft with 2 turns (QC):  3


Wheel 150 ft (QC):  2


Type of Wheelchair:  Manual





Stair Training


1 Step (curb) (QC):  88


4 Steps (QC):  88


12 Steps (QC):  88





Balance


Picking up an Object (QC):  88





ADL-Treatment


Eating (QC):  5


Oral Hygiene (QC):  4


Shower/Bathe Self (QC):  3


Upper Body Dressing (QC):  3 (mod A)


Lower Body Dressing (QC):  1


On/Off Footwear (QC):  1


Toileting Hygiene (QC):  1


Toilet Transfer (QC):  1





Assessment/Plan


Assessment and Plan


Assess & Plan/Chief Complaint


Assessment:


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


Infective Endocarditis of Atrial Valve


Dysarthria/expressive aphasia


Dysphagia with PEG tube


Right Knee Effusion 


Sacral Decubitus Ulcer with MRSA so started Vanc


Anemia-iron def so ordered Venofer


T2DM - ID


Hypothyroidism


Constipation


Hyperlipidemia


GERD


Chronic debility: (10/5/22 gastric ulcer perforation but used wheelchair 

periodically prior to that due to neuropathy, 11/4/22 Decubitus ulcer admit 

stage III wound vac placed, 1/29/23 used walker but wheelchair for long 

distances)


Urinary retention attempted to DC catheter 3/16/23 and required replacement of 

cath 3/17/23





Plan:


Monitor closely


Change Synthroid to PO


ST consult for dysphagia


Utilize PEG


PT OT





3/14/2023:


Monitor closely


Speech therapy to advanced diet if able





3/15/2023:


Monitor closely


Wound care





3/16/2023:


Dramatically improved


Wound care


IV abx





3/17/2023:


Urecholine


Monitor closely





3/18/2023:


DC tube feeding





3/19/2023:


DC catheter tomorrow








3/20/2023:


Wound care


Robles cath replaced





3/21/2023:


Monitor pain


IV abx





(1) CVA (cerebral vascular accident)











COOKIE CHRISTIE DO                Mar 21, 2023 04:58

## 2023-03-21 NOTE — OCCUPATIONAL THER DAILY NOTE
OT Current Status-Daily Note


Subjective


Pt alert, sitting up in bed eating breakfast.  Pt agrees to therapy.  No c/o 

pain.  Pt having difficulty with motor planning more than one movement at a 

time.  Co-treat with PT [id number], skills of 2 clinicians required to decrease 

fall risk, increase strength and stamina to complete standing and functional ta

sks.  PT focusing on transfers, standing and B LE strengthening while OT 

focusing on ADLs, functional mobility and managing R UE during all 

tasks/mobility.





Mental Status/Objective


Patient Orientation:  Person, Place, Non-Verbal/Aphasic, Time, Situation


Attachments:  Drains (wound vac), [place] Catheter, IV





ADL-Treatment


Pt able to complete eating using regular utensils.  Mod A for supine to EOB.  

Sat EOB with SBA.  Dependent to don footwear and lower body clothing.  Pt uses 

sit to stand lift to transfer from surface to surface.  Pt incontinent of bowel 

and requires assistance for hygiene.  Hygiene completed, brief placed.  Pt 

completed oral care independently.  Education on utilizing R hand for grasping 

items to open.  Pt using L UE/LE to propel w/c with mod A for steering.  Pt is 

going from sit to stand with mod A x2 then requiring max A to stay in standing 

with verbal cues for B UE placement and alignment of body.  After therapy, pt l

chet in bed on L side with call light/phone in reach.  Nrsg aware of pt's 

incontinence.  All needs met.


Therapy Code Descriptions/Definitions 





Functional [place] Measure:


0=Not Assessed/NA        4=Minimal Assistance


1=Total Assistance        5=Supervision or Setup


2=Maximal Assistance  6=Modified [place]


3=Moderate Assistance 7=Complete IndependenceSCALE: Activities may be completed 

with or without assistive devices.





6-Indepedent-patient completes the activity by him/herself with no assistance 

from a helper.


5-Set-up or Clean-up Assistance-helper sets up or cleans up; patient completes 

activity. [place] assists only prior to or  


    following the activity.


4-Supervision or Touching Assistance-helper provides verbal cues and/or 

touching/steadying and/or contact guard assistance as patient completes 

activity. Assistance may be provided   


    throughout the activity or intermittently.


3-Partial/Moderate Assistance-helper does LESS THAN HALF the effort. [place] 

lifts, holds or supports trunk or limbs, but provides less than half the effort.


2-Substantial/Maximal Assistance-helper does MORE THAN HALF the effort. [place] 

lifts or holds trunk or limbs and provides more than half the effort.


2-Qbqguntrp-bzdskn does ALL the effort. Patient does none of the effort to 

complete the activity. Or, the assistance of 2 or more helpers is required for 

the patient to complete the  


    activity.


If activity was not attempted, code reason:


7-Patient Refused.


9-Not Applicable-not attempted and the patient did not perform the activity 

before the current illness, exacerbation or injury.


10-Not Attempted due to Environmental Limitations-(lack of equipment, weather 

restraints, etc.).


88-Not Attempted due to Medical Conditions or Safety Concerns.


Eating (QC):  5


Oral Hygiene (QC):  6


Lower Body Dressing (QC):  1


On/Off Footwear:  1


Toileting Hygiene (QC):  1


Toilet Transfer (QC):  1





OT Short Term Goals


Short Term Goals


Time Frame:  Mar 28, 2023


Eating:  3


Toileting hygiene:  3


Shower/bathe self:  3


Lower body dressing:  3


Putting on/taking off footwear:  3





OT Long Term Goals


Long Term Goals


Time Frame:  2023


Acute change in mental status:  1


Inattention:  0


Disorganized thinkin


Altered level of consciousness:  0


Eating (QC):  5


Oral Hygiene (QC):  5


Toileting Hygiene (QC):  4


Shower/Bathe Self (QC):  4


Upper Body Dressing (QC):  5


Lower Body Dressing (QC):  4


On/Off Footwear (QC):  4


Additional Goals:  1-Demonstrate ADL Tasks, 2-Verbalize Understanding, 3-

ImproveStrength/Russ


1=Demonstrate adherence to instructed precautions during ADL tasks.


2=Patient will verbalize/demonstrate understanding of assistive 

devices/modifications for ADL.


3=Patient will improve strength/tolerance for activity to enable patient to 

perform ADL's.





OT Education/Plan


Problem List/Assessment


Assessment:  Decreased Activ Tolerance, Decreased UE Strength, Dependent 

Transfers, Impaired Bed Mobility, Impaired Coordination, Impaired Funct Balance,

Impaired Self-Care Skills, Restricted Funct UE ROM





Discharge Recommendations


Plan/Recommendations:  Continue POC





Treatment Plan/Plan of Care


Patient would benefit from OT for education, treatment and training to promote 

independence in ADL's, mobility, safety and/or upper extremity function for 

ADL's.


Plan of Care:  ADL Retraining, Functional Mobility, Group Exercise/Act as Ind, 

UE Funct Exercise/Act, UE Neuromus Re-Ed/Coord, Visual/Perceptual Retrain, W/C 

Management Training


Treatment Duration:  2023


Frequency:  At least 5 of 7 days/Wk (IRF)


Estimated Hrs Per Day:  1.5 hours per day


Agreement:  Yes


Rehab Potential:  Fair





Time


Start Time:  07:45


Stop Time:  09:00


DATE:  Mar 21, 2023


Total Time Billed (hr/min):  75


Billed Treatment Time


1 visit-ADL 2 (30 min)  FA 3 (45 min)  co-treat with PT [id number], individual 

[id number]











ARTIE MC               Mar 21, 2023 12:34

## 2023-03-22 VITALS — SYSTOLIC BLOOD PRESSURE: 104 MMHG | DIASTOLIC BLOOD PRESSURE: 52 MMHG

## 2023-03-22 VITALS — SYSTOLIC BLOOD PRESSURE: 115 MMHG | DIASTOLIC BLOOD PRESSURE: 70 MMHG

## 2023-03-22 RX ADMIN — INSULIN ASPART SCH UNIT: 100 INJECTION, SOLUTION INTRAVENOUS; SUBCUTANEOUS at 17:19

## 2023-03-22 RX ADMIN — DOCUSATE SODIUM SCH MG: 100 CAPSULE ORAL at 20:11

## 2023-03-22 RX ADMIN — DOCUSATE SODIUM AND SENNOSIDES SCH EA: 8.6; 5 TABLET, FILM COATED ORAL at 20:12

## 2023-03-22 RX ADMIN — FOLIC ACID SCH MG: 1 TABLET ORAL at 09:34

## 2023-03-22 RX ADMIN — POTASSIUM CHLORIDE SCH MEQ: 750 TABLET, FILM COATED, EXTENDED RELEASE ORAL at 09:34

## 2023-03-22 RX ADMIN — SUCRALFATE SCH GM: 1 TABLET ORAL at 21:06

## 2023-03-22 RX ADMIN — VANCOMYCIN SCH MLS/HR: 1.5 INJECTION, SOLUTION INTRAVENOUS at 11:32

## 2023-03-22 RX ADMIN — FAMOTIDINE SCH MG: 20 TABLET, FILM COATED ORAL at 21:06

## 2023-03-22 RX ADMIN — Medication SCH EACH: at 21:06

## 2023-03-22 RX ADMIN — DOCUSATE SODIUM AND SENNOSIDES SCH EA: 8.6; 5 TABLET, FILM COATED ORAL at 09:37

## 2023-03-22 RX ADMIN — INSULIN ASPART SCH UNIT: 100 INJECTION, SOLUTION INTRAVENOUS; SUBCUTANEOUS at 06:19

## 2023-03-22 RX ADMIN — Medication SCH EACH: at 09:34

## 2023-03-22 RX ADMIN — NYSTATIN SCH GM: 100000 CREAM TOPICAL at 21:08

## 2023-03-22 RX ADMIN — MELATONIN 3 MG ORAL TABLET SCH MG: 3 TABLET ORAL at 21:06

## 2023-03-22 RX ADMIN — BETHANECHOL CHLORIDE SCH MG: 25 TABLET ORAL at 17:21

## 2023-03-22 RX ADMIN — SODIUM BICARBONATE SCH ML: 84 INJECTION, SOLUTION INTRAVENOUS at 05:49

## 2023-03-22 RX ADMIN — MICONAZOLE NITRATE SCH APPLIC: 20 POWDER TOPICAL at 09:39

## 2023-03-22 RX ADMIN — DOCUSATE SODIUM SCH MG: 100 CAPSULE ORAL at 09:37

## 2023-03-22 RX ADMIN — SUCRALFATE SCH GM: 1 TABLET ORAL at 11:31

## 2023-03-22 RX ADMIN — SUCRALFATE SCH GM: 1 TABLET ORAL at 17:21

## 2023-03-22 RX ADMIN — NYSTATIN SCH GM: 100000 CREAM TOPICAL at 12:19

## 2023-03-22 RX ADMIN — POLYETHYLENE GLYCOL (3350) SCH GM: 17 POWDER, FOR SOLUTION ORAL at 20:11

## 2023-03-22 RX ADMIN — SODIUM CHLORIDE SCH MG: 900 INJECTION, SOLUTION INTRAVENOUS at 11:29

## 2023-03-22 RX ADMIN — BETHANECHOL CHLORIDE SCH MG: 25 TABLET ORAL at 05:49

## 2023-03-22 RX ADMIN — NYSTATIN SCH GM: 100000 CREAM TOPICAL at 09:39

## 2023-03-22 RX ADMIN — BETHANECHOL CHLORIDE SCH MG: 25 TABLET ORAL at 11:31

## 2023-03-22 RX ADMIN — POTASSIUM CHLORIDE SCH MEQ: 750 TABLET, FILM COATED, EXTENDED RELEASE ORAL at 17:21

## 2023-03-22 RX ADMIN — FAMOTIDINE SCH MG: 20 TABLET, FILM COATED ORAL at 09:34

## 2023-03-22 RX ADMIN — Medication SCH MCG: at 05:49

## 2023-03-22 RX ADMIN — SUCRALFATE SCH GM: 1 TABLET ORAL at 05:49

## 2023-03-22 RX ADMIN — INSULIN ASPART SCH UNIT: 100 INJECTION, SOLUTION INTRAVENOUS; SUBCUTANEOUS at 21:12

## 2023-03-22 RX ADMIN — BETHANECHOL CHLORIDE SCH MG: 25 TABLET ORAL at 21:06

## 2023-03-22 RX ADMIN — PREGABALIN SCH MG: 75 CAPSULE ORAL at 21:06

## 2023-03-22 RX ADMIN — MICONAZOLE NITRATE SCH APPLIC: 20 POWDER TOPICAL at 21:08

## 2023-03-22 RX ADMIN — ENOXAPARIN SODIUM SCH MG: 100 INJECTION SUBCUTANEOUS at 17:18

## 2023-03-22 RX ADMIN — PREGABALIN SCH MG: 75 CAPSULE ORAL at 09:34

## 2023-03-22 RX ADMIN — INSULIN ASPART SCH UNIT: 100 INJECTION, SOLUTION INTRAVENOUS; SUBCUTANEOUS at 11:11

## 2023-03-22 RX ADMIN — POLYETHYLENE GLYCOL (3350) SCH GM: 17 POWDER, FOR SOLUTION ORAL at 09:37

## 2023-03-22 RX ADMIN — AMITRIPTYLINE HYDROCHLORIDE SCH MG: 25 TABLET, FILM COATED ORAL at 21:06

## 2023-03-22 RX ADMIN — LEVOTHYROXINE SODIUM SCH MCG: 100 TABLET ORAL at 05:49

## 2023-03-22 RX ADMIN — LIDOCAINE SCH EA: 50 PATCH CUTANEOUS at 09:34

## 2023-03-22 NOTE — PM&R PROGRESS NOTE
Subjective


HPI/CC On Admission


Date Seen by Provider:  Mar 22, 2023


Time Seen by Provider:  09:00


Subjective/Events-last exam


3/22/2023:


Much improved status 


Robles cath still in place


Urecholine maintained


Infection risk with in-dwelling cath


Very debilitated so unsure she could perform in/out caths at this time








3/21/2023:


No major events


Pain controlled


Dr Walker called me to update me on the MRI findings


No otseo noted under the wound








3/20/2023:


No major events


Improved transfers


Catheter removed but later could not void so replaced robles


Sugars monitored


Dr Walker consulted for wound








3/19/2023:


Much improved


Resting today


Family at bedside


No pain reported


Will DC catheter tomorrow








3/18/2023:


No major issues


DC tube feeding to prevent overfeeding


Dysarthria improved


Reviewed meds








3/17/2023:


Much improved


Participation is good


No falls


Pain is chronic neuropathy


Reinserted Robles catheter due to retention last night








3/16/2023:


Improved dramatically


Stood for 35 seconds with parallel bars


No pain reported except wound


Family at bedside








3/15/2023:


Improved overall


Family at bedside


Wound vac on hold due to bacteria in the wound culture


Dr Zamorano managing the IV abx 








3/14/2023:


Doing much better


Reviewed back history on her pre-existing debility:


10/5/22 gastric ulcer perforation but used wheelchair periodically prior to that

due to neuropathy


11/4/22 Decubitus ulcer admit stage III wound vac placed


1/29/23 used walker but wheelchair for long distances


Speech will see her today





Review of Systems


General:  Fatigue, Malaise





Objective


Exam


Vital Signs





Vital Signs








  Date Time  Temp Pulse Resp B/P (MAP) Pulse Ox O2 Delivery O2 Flow Rate FiO2


 


3/22/23 21:00     94 Room Air  


 


3/22/23 20:10 36.5 92 18 104/52 (69)    





Capillary Refill :


General Appearance:  No Apparent Distress, WD/WN, Anxious, Chronically ill


HEENT:  PERRL/EOMI, Normal ENT Inspection, Pharynx Normal


Neck:  Full Range of Motion, Normal Inspection, Non Tender, Supple, Carotid 

Bruit


Respiratory:  Chest Non Tender, Lungs Clear, Normal Breath Sounds, No Accessory 

Muscle Use, No Respiratory Distress


Cardiovascular:  Regular Rate, Rhythm, No Edema, No Gallop, No JVD, No Murmur, 

Normal Peripheral Pulses


Gastrointestinal:  Normal Bowel Sounds, No Organomegaly, No Pulsatile Mass, Non 

Tender, Soft


Back:  Normal Inspection, No CVA Tenderness, No Vertebral Tenderness


Extremity:  Normal Capillary Refill, Normal Inspection, Normal Range of Motion, 

Non Tender, No Calf Tenderness, No Pedal Edema


Neurologic/Psychiatric:  Alert, Oriented x3, CNs II-XII Norm as Tested, Abnormal

CNs II-XII, Depressed Affect, Facial Droop, Motor Weakness


Skin:  Normal Color, Warm/Dry


Lymphatic:  No Adenopathy





Results/Procedures


Lab


Patient resulted labs reviewed.





FIM


Transfers


Therapy Code Descriptions/Definitions 





Functional [place] Measure:


0=Not Assessed/NA        4=Minimal Assistance


1=Total Assistance        5=Supervision or Setup


2=Maximal Assistance  6=Modified [place]


3=Moderate Assistance 7=Complete IndependenceSCALE: Activities may be completed 

with or without assistive devices.





6-Indepedent-patient completes the activity by him/herself with no assistance 

from a helper.


5-Set-up or Clean-up Assistance-helper sets up or cleans up; patient completes 

activity. [place] assists only prior to or  


    following the activity.


4-Supervision or Touching Assistance-helper provides verbal cues and/or touch

ing/steadying and/or contact guard assistance as patient completes activity. 

Assistance may be provided   


    throughout the activity or intermittently.


3-Partial/Moderate Assistance-helper does LESS THAN HALF the effort. [place] 

lifts, holds or supports trunk or limbs, but provides less than half the effort.


2-Substantial/Maximal Assistance-helper does MORE THAN HALF the effort. [place] 

lifts or holds trunk or limbs and provides more than half the effort.


8-Ozimrnivq-eqvjae does ALL the effort. Patient does none of the effort to 

complete the activity. Or, the assistance of 2 or more helpers is required for 

the patient to complete the  


    activity.


If activity was not attempted, code reason:


7-Patient Refused.


9-Not Applicable-not attempted and the patient did not perform the activity 

before the current illness, exacerbation or injury.


10-Not Attempted due to Environmental Limitations-(lack of equipment, weather 

restraints, etc.).


88-Not Attempted due to Medical Conditions or Safety Concerns.


Roll Left to Right (QC):  2


Sit to Lying (QC):  2


Sit to Stand (QC):  1


Chair/Bed-to-Chair Xfer(QC):  1


Car Transfer (QC):  88





Gait Training


Does the Patient Walk?:  No and Walking Goal NOT indicated


Walk 10 feet (QC):  88


Walk 50 ft with 2 Turns(QC):  88


Walk 150 ft (QC):  88


Walking 10ft/uneven surface-QC:  88





Wheelchair Training


Does the Pt Use a Wheelchair?:  Yes


Wheel 50 ft with 2 turns (QC):  4


Wheel 150 ft (QC):  4


Type of Wheelchair:  Manual





Stair Training


1 Step (curb) (QC):  88


4 Steps (QC):  88


12 Steps (QC):  88





Balance


Picking up an Object (QC):  88





ADL-Treatment


Eating (QC):  5


Oral Hygiene (QC):  6


Shower/Bathe Self (QC):  3


Upper Body Dressing (QC):  3 (mod A)


Lower Body Dressing (QC):  1


On/Off Footwear (QC):  1


Toileting Hygiene (QC):  1


Toilet Transfer (QC):  1





Assessment/Plan


Assessment and Plan


Assess & Plan/Chief Complaint


Assessment:


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


Infective Endocarditis of Atrial Valve


Dysarthria/expressive aphasia


Dysphagia with PEG tube


Right Knee Effusion 


Sacral Decubitus Ulcer with MRSA so started Vanc


Anemia-iron def so ordered Venofer


T2DM - ID


Hypothyroidism


Constipation


Hyperlipidemia


GERD


Chronic debility: (10/5/22 gastric ulcer perforation but used wheelchair perio

dically prior to that due to neuropathy, 11/4/22 Decubitus ulcer admit stage III

wound vac placed, 1/29/23 used walker but wheelchair for long distances)


Urinary retention attempted to DC catheter 3/16/23 and required replacement of 

cath 3/17/23





Plan:


Monitor closely


Change Synthroid to PO


ST consult for dysphagia


Utilize PEG


PT OT





3/14/2023:


Monitor closely


Speech therapy to advanced diet if able





3/15/2023:


Monitor closely


Wound care





3/16/2023:


Dramatically improved


Wound care


IV abx





3/17/2023:


Urecholine


Monitor closely





3/18/2023:


DC tube feeding





3/19/2023:


DC catheter tomorrow








3/20/2023:


Wound care


Robles cath replaced





3/21/2023:


Monitor pain


IV abx





3/22/2023:


Maintain cath


Monitor closely





(1) CVA (cerebral vascular accident)











CHRISTIE,COOKIE DO                Mar 22, 2023 07:23

## 2023-03-22 NOTE — OCCUPATIONAL THER DAILY NOTE
OT Current Status-Daily Note


Subjective


Pt sleeping in bed, woke easily to name.  Pt agrees to therapy.  No c/o pain.  

Wound vac noted to have decreasing pressure, notified wound care nrsg.





Mental Status/Objective


Patient Orientation:  Person, Place, Non-Verbal/Aphasic, Time, Situation


Attachments:  IV





ADL-Treatment


Pt agrees to shower.  Pt is able to open containers/packages with increased time

and uses regular utensils to eat.  Mod A for supine <--> EOB, sitting EOB 

independent.  Sit to stand lift for all transfers.  Pt sat 100% of the time to 

complete shower on rolling shower chair using grabbars and hand held shower to 

bathe all areas except lower legs/feet and buttocks.  Min A to thread R UE into 

sleeve then pt completed rest of UBD sequence.  Dependent for lower body and 

footwear.  Independent sitting at sink for oral care.  Dependent for toileting. 

After session, pt lying in bed eating breakfast.  Call light/phone in reach.  

All needs met in room.


Therapy Code Descriptions/Definitions 





Functional [place] Measure:


0=Not Assessed/NA        4=Minimal Assistance


1=Total Assistance        5=Supervision or Setup


2=Maximal Assistance  6=Modified [place]


3=Moderate Assistance 7=Complete IndependenceSCALE: Activities may be completed 

with or without assistive devices.





6-Indepedent-patient completes the activity by him/herself with no assistance 

from a helper.


5-Set-up or Clean-up Assistance-helper sets up or cleans up; patient completes a

ctivity. [place] assists only prior to or  


    following the activity.


4-Supervision or Touching Assistance-helper provides verbal cues and/or 

touching/steadying and/or contact guard assistance as patient completes 

activity. Assistance may be provided   


    throughout the activity or intermittently.


3-Partial/Moderate Assistance-helper does LESS THAN HALF the effort. [place] 

lifts, holds or supports trunk or limbs, but provides less than half the effort.


2-Substantial/Maximal Assistance-helper does MORE THAN HALF the effort. [place] 

lifts or holds trunk or limbs and provides more than half the effort.


1-Dpodufeyx-phuvil does ALL the effort. Patient does none of the effort to 

complete the activity. Or, the assistance of 2 or more helpers is required for 

the patient to complete the  


    activity.


If activity was not attempted, code reason:


7-Patient Refused.


9-Not Applicable-not attempted and the patient did not perform the activity 

before the current illness, exacerbation or injury.


10-Not Attempted due to Environmental Limitations-(lack of equipment, weather 

restraints, etc.).


88-Not Attempted due to Medical Conditions or Safety Concerns.


Eating (QC):  5


Oral Hygiene (QC):  6


Shower/Bathe Self (QC):  3


Upper Body Dressing (QC):  3


Lower Body Dressing (QC):  1


On/Off Footwear:  1


Toileting Hygiene (QC):  1


Toilet Transfer (QC):  1





OT Short Term Goals


Short Term Goals


Time Frame:  Mar 28, 2023


Eating:  3


Toileting hygiene:  3


Shower/bathe self:  3


Lower body dressing:  3


Putting on/taking off footwear:  3





OT Long Term Goals


Long Term Goals


Time Frame:  2023


Acute change in mental status:  1


Inattention:  0


Disorganized thinkin


Altered level of consciousness:  0


Eating (QC):  5


Oral Hygiene (QC):  5


Toileting Hygiene (QC):  4


Shower/Bathe Self (QC):  4


Upper Body Dressing (QC):  5


Lower Body Dressing (QC):  4


On/Off Footwear (QC):  4


Additional Goals:  1-Demonstrate ADL Tasks, 2-Verbalize Understanding, 3-

ImproveStrength/Russ


1=Demonstrate adherence to instructed precautions during ADL tasks.


2=Patient will verbalize/demonstrate understanding of assistive 

devices/modifications for ADL.


3=Patient will improve strength/tolerance for activity to enable patient to 

perform ADL's.





OT Education/Plan


Problem List/Assessment


Assessment:  Decreased Activ Tolerance, Decreased UE Strength, Dependent 

Transfers, Impaired Bed Mobility, Impaired Funct Balance, Impaired Self-Care 

Skills, Restricted Funct UE ROM





Discharge Recommendations


Plan/Recommendations:  Continue POC





Treatment Plan/Plan of Care


Patient would benefit from OT for education, treatment and training to promote 

independence in ADL's, mobility, safety and/or upper extremity function for 

ADL's.


Plan of Care:  ADL Retraining, Functional Mobility, Group Exercise/Act as Ind, 

UE Funct Exercise/Act, UE Neuromus Re-Ed/Coord, Visual/Perceptual Retrain, W/C 

Management Training


Treatment Duration:  2023


Frequency:  At least 5 of 7 days/Wk (IRF)


Estimated Hrs Per Day:  1.5 hours per day


Agreement:  Yes


Rehab Potential:  Guarded





Time


Start Time:  06:45


Stop Time:  08:00


DATE:  Mar 22, 2023


Total Time Billed (hr/min):  75


Billed Treatment Time


1 visit-ADL 5 (75 min)











ARTIE MC               Mar 22, 2023 08:55

## 2023-03-22 NOTE — PHYSICAL THERAPY DAILY NOTE
PT Daily Note-Current


Subjective


Patient in bed pre tx, agrees to PT but states she just had her wound vac 

changed and has a lot of pain and would like to stay in bed.





Pain


Section J - Health Conditions


1. Rarely or not at all


2. Occasionally


3. Frequently


4. Almost constantly


8. Unable to answer


Pain Effect on Sleep:  1


Pain Interference with Therapy:  1


Pain Interference w/Day-to-Day:  1





Appearance


Patient in bed post tx with nurse call, phone, tray, all needs met.





Mental Status


Patient Orientation:  Person, Place, Situation


Attachments:  Clayton Catheter


wound vac





Transfers


SCALE: Activities may be completed with or without assistive devices.





6-Indepedent-patient completes the activity by him/herself with no assistance 

from a helper.


5-Set-up or Clean-up Assistance-helper sets up or cleans up; patient completes 

activity. [place] assists only prior to or  


    following the activity.


4-Supervision or Touching Assistance-helper provides verbal cues and/or 

touching/steadying and/or contact guard assistance as patient completes 

activity. Assistance may be provided   


    throughout the activity or intermittently.


3-Partial/Moderate Assistance-helper does LESS THAN HALF the effort. [place] 

lifts, holds or supports trunk or limbs, but provides less than half the effort.


2-Substantial/Maximal Assistance-helper does MORE THAN HALF the effort. [place] 

lifts or holds trunk or limbs and provides more than half the effort.


8-Ozlvcaysv-unakhc does ALL the effort. Patient does none of the effort to 

complete the activity. Or, the assistance of 2 or more helpers is required for 

the patient to complete the  


    activity.


If activity was not attempted, code reason:


7-Patient Refused.


9-Not Applicable-not attempted and the patient did not perform the activity 

before the current illness, exacerbation or injury.


10-Not Attempted due to Environmental Limitations-(lack of equipment, weather 

restraints, etc.).


88-Not Attempted due to Medical Conditions or Safety Concerns.


Roll Left & Right (QC):  3


Patient practiced rolling to each side x5, cued and assisted for proper 

positioning and hand placement.  Patient needed occasional assist reaching with 

her right arm when turning to her left





Weight Bearing


Weight Bearing/Tolerated


Weight Bearing/Tolerated





Exercises


Supine Ex:  Ankle pumps (2 sets), Quad Set (2 sets), Glut sets (2 sets), Heel 

Slides (AAROM RLE), Short Arc Quads, Straight leg raise (AAROM RLE), Hip abd/add

(AAROM RLE)


Supine Reps:  20 (BLE)





Treatments


BLE strengthening/ROM, practice rolling





Assessment


Current Status:  Fair Progress


improved rolling especially to the right side





PT Long Term Goals


Long Term Goals


PT Long Term Goals Time Frame:  May 8, 2023


Roll Left & Right (QC):  4


Sit to Lying (QC):  4


Lying-Sitting on Side/Bed(QC):  4


Sit to Stand (QC):  2


Chair/Bed-to-Chair Xfer(QC):  2


Toilet Transfer (QC):  2


Car Transfer (QC):  2


Does the Patient Walk:  No and Walking Goal NOT indicated


Walk 10 feet (QC):  1


Walk 50ft with 2 Turns (QC):  88


Walk 150 ft (QC):  88


Walking 10ft on Uneven Surface:  88


1 Step (curb) (QC):  88


4 Steps (QC):  88


12 Steps (QC):  88


Picking up an Object (QC):  88


Does the Pt use WC or Scooter?:  Yes


Wheel 50 feet with 2 turns (QC:  5 (Patient able to use (L) LE and (B) LE's for 

w/c propulsion)


Type:  Manual


Wheel 150 feet:  5


Type:  Manual





PT Plan


Problem List


Problem List:  Activity Tolerance, Functional Strength, Safety, Balance, Gait, 

Transfer, Bed Mobility, ROM





Treatment/Plan


Treatment Plan:  Continue Plan of Care


Treatment Plan:  Bed Mobility, Concurrent Therapy, Education, Functional 

Activity Russ, Functional Strength, Group Therapy, Safety, Therapeutic Ex

ercise, Transfers, Other (W/C mobility)


Treatment Duration:  May 8, 2023


Frequency:  At least 5 of 7 days/Wk (IRF)


Estimated Hrs Per Day:  1.5 hours per day


Patient and/or Family Agrees t:  Yes





Safety Risks/Education


Patient Education:  Correct Positioning, Safety Issues


Teaching Recipient:  Patient


Teaching Methods:  Demonstration, Discussion


Response to Teaching:  Reinforcement Needed





Time


Time In:  1000


Time Out:  1045


DATE:  Mar 22, 2023


Total Billed Treatment Time:  45


Total Billed Treatment


1 visit


EX 25'


FA 20'











MING HESS PT                Mar 22, 2023 10:40

## 2023-03-22 NOTE — PHYSICAL THERAPY DAILY NOTE
PT Daily Note-Current


Subjective


Patient in bed pre tx, agrees to PT, has unrated pain on her bottom.





Pain


Section J - Health Conditions


1. Rarely or not at all


2. Occasionally


3. Frequently


4. Almost constantly


8. Unable to answer


Pain Effect on Sleep:  1


Pain Interference with Therapy:  1


Pain Interference w/Day-to-Day:  1





Appearance


Patient in bed post tx with nurse call, phone, tray, all needs met, on left side

with pillow support for pressure relief.





Mental Status


Patient Orientation:  Person, Place, Situation


Attachments:  [place]ay[place] Catheter


wound vac





Transfers


SCALE: Activities may be completed with or without assistive devices.





6-Indepedent-patient completes the activity by him/herself with no assistance 

from a helper.


5-Set-up or Clean-up Assistance-helper sets up or cleans up; patient completes 

activity. [place] assists only prior to or  


    following the activity.


4-Supervision or Touching Assistance-helper provides verbal cues and/or 

touching/steadying and/or contact guard assistance as patient completes 

activity. Assistance may be provided   


    throughout the activity or intermittently.


3-Partial/Moderate Assistance-helper does LESS THAN HALF the effort. [place] 

lifts, holds or supports trunk or limbs, but provides less than half the effort.


2-Substantial/Maximal Assistance-helper does MORE THAN HALF the effort. [place] 

lifts or holds trunk or limbs and provides more than half the effort.


8-Jdrifdadb-pulenf does ALL the effort. Patient does none of the effort to 

complete the activity. Or, the assistance of 2 or more helpers is required for 

the patient to complete the  


    activity.


If activity was not attempted, code reason:


7-Patient Refused.


9-Not Applicable-not attempted and the patient did not perform the activity 

before the current illness, exacerbation or injury.


10-Not Attempted due to Environmental Limitations-(lack of equipment, weather 

restraints, etc.).


88-Not Attempted due to Medical Conditions or Safety Concerns.


Roll Left & Right (QC):  2


Sit to Lying (QC):  3


Lying to Sitting/Side of Bed(Q:  3


Sit to Stand (QC):  1


Chair/Bed-to-Chair Xfer(QC):  1


Patient supine to sit with mod assist, transfer to  via sit to stand machine, 

propels to therapy gym and into parallel bars, stands twice for about a minute 

each time with assist of 2, propels back to room, stands with sit to stand 

machine (helper wipes bottom due to small BM, transfers to bed and lays down.





Weight Bearing


Weight Bearing/Tolerated


Weight Bearing/Tolerated





Wheelchair Training


Does the Pt Use a Wheelchair?:  Yes


Wheel 50 ft with 2 turns (QC):  3


Type of Wheelchair:  Manual


100'x2





Treatments


bed mobility and transfers, standing, WC mobility





Assessment


Current Status:  Poor Progress


very little change in mobility





PT Long Term Goals


Long Term Goals


PT Long Term Goals Time Frame:  May 8, 2023


Roll Left & Right (QC):  4


Sit to Lying (QC):  4


Lying-Sitting on Side/Bed(QC):  4


Sit to Stand (QC):  2


Chair/Bed-to-Chair Xfer(QC):  2


Toilet Transfer (QC):  2


Car Transfer (QC):  2


Does the Patient Walk:  No and Walking Goal NOT indicated


Walk 10 feet (QC):  1


Walk 50ft with 2 Turns (QC):  88


Walk 150 ft (QC):  88


Walking 10ft on Uneven Surface:  88


1 Step (curb) (QC):  88


4 Steps (QC):  88


12 Steps (QC):  88


Picking up an Object (QC):  88


Does the Pt use WC or Scooter?:  Yes


Wheel 50 feet with 2 turns (QC:  5 (Patient able to use (L) LE and (B) LE's for 

w/c propulsion)


Type:  Manual


Wheel 150 feet:  5


Type:  Manual





PT Plan


Problem List


Problem List:  Activity Tolerance, Functional Strength, Safety, Balance, Gait, 

Transfer, Bed Mobility, ROM





Treatment/Plan


Treatment Plan:  Continue Plan of Care


Treatment Plan:  Bed Mobility, Concurrent Therapy, Education, Functional 

Activity Russ, Functional Strength, Group Therapy, Safety, Therapeutic 

Exercise, Transfers, Other (W/C mobility)


Treatment Duration:  May 8, 2023


Frequency:  At least 5 of 7 days/Wk (IRF)


Estimated Hrs Per Day:  1.5 hours per day


Patient and/or Family Agrees t:  Yes





Safety Risks/Education


Patient Education:  Transfer Techniques, Correct Positioning, W/C Management, 

Safety Issues


Teaching Recipient:  Patient


Teaching Methods:  Demonstration, Discussion


Response to Teaching:  Reinforcement Needed





Time


Time In:  1300


Time Out:  1330


DATE:  Mar 22, 2023


Total Billed Treatment Time:  30


Total Billed Treatment


1 visit


FA 30'











MING HESS PT                Mar 22, 2023 13:58

## 2023-03-23 VITALS — SYSTOLIC BLOOD PRESSURE: 127 MMHG | DIASTOLIC BLOOD PRESSURE: 65 MMHG

## 2023-03-23 VITALS — DIASTOLIC BLOOD PRESSURE: 56 MMHG | SYSTOLIC BLOOD PRESSURE: 118 MMHG

## 2023-03-23 RX ADMIN — BETHANECHOL CHLORIDE SCH MG: 25 TABLET ORAL at 16:50

## 2023-03-23 RX ADMIN — INSULIN ASPART SCH UNIT: 100 INJECTION, SOLUTION INTRAVENOUS; SUBCUTANEOUS at 11:08

## 2023-03-23 RX ADMIN — BETHANECHOL CHLORIDE SCH MG: 25 TABLET ORAL at 11:24

## 2023-03-23 RX ADMIN — ENOXAPARIN SODIUM SCH MG: 100 INJECTION SUBCUTANEOUS at 16:53

## 2023-03-23 RX ADMIN — INSULIN ASPART SCH UNIT: 100 INJECTION, SOLUTION INTRAVENOUS; SUBCUTANEOUS at 21:11

## 2023-03-23 RX ADMIN — SODIUM CHLORIDE SCH MLS/HR: 900 INJECTION, SOLUTION INTRAVENOUS at 13:31

## 2023-03-23 RX ADMIN — MELATONIN 3 MG ORAL TABLET SCH MG: 3 TABLET ORAL at 21:11

## 2023-03-23 RX ADMIN — SODIUM BICARBONATE SCH ML: 84 INJECTION, SOLUTION INTRAVENOUS at 06:36

## 2023-03-23 RX ADMIN — Medication SCH MCG: at 06:36

## 2023-03-23 RX ADMIN — SUCRALFATE SCH GM: 1 TABLET ORAL at 16:50

## 2023-03-23 RX ADMIN — MICONAZOLE NITRATE SCH APPLIC: 20 POWDER TOPICAL at 09:14

## 2023-03-23 RX ADMIN — BETHANECHOL CHLORIDE SCH MG: 25 TABLET ORAL at 06:36

## 2023-03-23 RX ADMIN — SUCRALFATE SCH GM: 1 TABLET ORAL at 06:36

## 2023-03-23 RX ADMIN — INSULIN ASPART SCH UNIT: 100 INJECTION, SOLUTION INTRAVENOUS; SUBCUTANEOUS at 16:53

## 2023-03-23 RX ADMIN — BETHANECHOL CHLORIDE SCH MG: 25 TABLET ORAL at 21:11

## 2023-03-23 RX ADMIN — NYSTATIN SCH GM: 100000 CREAM TOPICAL at 13:31

## 2023-03-23 RX ADMIN — DOCUSATE SODIUM SCH MG: 100 CAPSULE ORAL at 21:00

## 2023-03-23 RX ADMIN — PREGABALIN SCH MG: 75 CAPSULE ORAL at 09:11

## 2023-03-23 RX ADMIN — Medication SCH EACH: at 09:11

## 2023-03-23 RX ADMIN — PREGABALIN SCH MG: 75 CAPSULE ORAL at 21:11

## 2023-03-23 RX ADMIN — Medication SCH EACH: at 21:11

## 2023-03-23 RX ADMIN — POTASSIUM CHLORIDE SCH MEQ: 750 TABLET, FILM COATED, EXTENDED RELEASE ORAL at 09:11

## 2023-03-23 RX ADMIN — NYSTATIN SCH GM: 100000 CREAM TOPICAL at 21:00

## 2023-03-23 RX ADMIN — SUCRALFATE SCH GM: 1 TABLET ORAL at 21:11

## 2023-03-23 RX ADMIN — POTASSIUM CHLORIDE SCH MEQ: 750 TABLET, FILM COATED, EXTENDED RELEASE ORAL at 18:03

## 2023-03-23 RX ADMIN — NYSTATIN SCH GM: 100000 CREAM TOPICAL at 09:13

## 2023-03-23 RX ADMIN — POLYETHYLENE GLYCOL (3350) SCH GM: 17 POWDER, FOR SOLUTION ORAL at 09:12

## 2023-03-23 RX ADMIN — AMITRIPTYLINE HYDROCHLORIDE SCH MG: 25 TABLET, FILM COATED ORAL at 21:11

## 2023-03-23 RX ADMIN — FAMOTIDINE SCH MG: 20 TABLET, FILM COATED ORAL at 09:11

## 2023-03-23 RX ADMIN — VANCOMYCIN HYDROCHLORIDE SCH MLS/HR: 500 INJECTION, POWDER, LYOPHILIZED, FOR SOLUTION INTRAVENOUS at 13:30

## 2023-03-23 RX ADMIN — MICONAZOLE NITRATE SCH APPLIC: 20 POWDER TOPICAL at 21:12

## 2023-03-23 RX ADMIN — LIDOCAINE SCH EA: 50 PATCH CUTANEOUS at 09:11

## 2023-03-23 RX ADMIN — FOLIC ACID SCH MG: 1 TABLET ORAL at 09:11

## 2023-03-23 RX ADMIN — INSULIN ASPART SCH UNIT: 100 INJECTION, SOLUTION INTRAVENOUS; SUBCUTANEOUS at 05:51

## 2023-03-23 RX ADMIN — DOCUSATE SODIUM SCH MG: 100 CAPSULE ORAL at 09:11

## 2023-03-23 RX ADMIN — SUCRALFATE SCH GM: 1 TABLET ORAL at 11:24

## 2023-03-23 RX ADMIN — DOCUSATE SODIUM AND SENNOSIDES SCH EA: 8.6; 5 TABLET, FILM COATED ORAL at 09:12

## 2023-03-23 RX ADMIN — FAMOTIDINE SCH MG: 20 TABLET, FILM COATED ORAL at 21:12

## 2023-03-23 RX ADMIN — POLYETHYLENE GLYCOL (3350) SCH GM: 17 POWDER, FOR SOLUTION ORAL at 21:00

## 2023-03-23 RX ADMIN — LEVOTHYROXINE SODIUM SCH MCG: 100 TABLET ORAL at 06:36

## 2023-03-23 RX ADMIN — DOCUSATE SODIUM AND SENNOSIDES SCH EA: 8.6; 5 TABLET, FILM COATED ORAL at 21:00

## 2023-03-23 NOTE — PHYSICAL THERAPY DAILY NOTE
PT Daily Note-Current


Subjective


Patient with improved mood this pm., states she is still slightly dizzy.  Family

present in room.  Less pain at wound.  Wound care nurse states that wound is 

getting smaller - anticipate D/C of wound vac soon.





Pain


Section J - Health Conditions


1. Rarely or not at all


2. Occasionally


3. Frequently


4. Almost constantly


8. Unable to answer


Pain Effect on Sleep:  1


Pain Interference with Therapy:  2


Pain Interference w/Day-to-Day:  2





Transfers


SCALE: Activities may be completed with or without assistive devices.





6-Indepedent-patient completes the activity by him/herself with no assistance 

from a helper.


5-Set-up or Clean-up Assistance-helper sets up or cleans up; patient completes 

activity. [place] assists only prior to or  


    following the activity.


4-Supervision or Touching Assistance-helper provides verbal cues and/or touching

/steadying and/or contact guard assistance as patient completes activity. 

Assistance may be provided   


    throughout the activity or intermittently.


3-Partial/Moderate Assistance-helper does LESS THAN HALF the effort. [place] 

lifts, holds or supports trunk or limbs, but provides less than half the effort.


2-Substantial/Maximal Assistance-helper does MORE THAN HALF the effort. [place] 

lifts or holds trunk or limbs and provides more than half the effort.


8-Rqyvxdcmw-egitzs does ALL the effort. Patient does none of the effort to 

complete the activity. Or, the assistance of 2 or more helpers is required for 

the patient to complete the  


    activity.


If activity was not attempted, code reason:


7-Patient Refused.


9-Not Applicable-not attempted and the patient did not perform the activity 

before the current illness, exacerbation or injury.


10-Not Attempted due to Environmental Limitations-(lack of equipment, weather 

restraints, etc.).


88-Not Attempted due to Medical Conditions or Safety Concerns.


Due to persistent dizziness, will hold OOB activities at this time.  Resume in 

the a.m.





Weight Bearing


Weight Bearing/Tolerated


Weight Bearing/Tolerated





Exercises


(B) LE exercise: verbal cues for 90% of reps


(L) ankle pumps x 10 with :10 heel cord stretch at end range DF.


(L) ankle circles AROM x 10 cw/ccw


(L) hip ER/IR with leg extended - weakness noted more on (L)/unaffected hip IR 

compared to (R)/affected side this pm


(L) quad sets 5 x :05


(L) SAQ x 10 AROM


(L) hip abd/add x 10 min (A) with more weakness (L) thant (R) this date


(L) heel slide with min (A) for full range x 10


(L) SLR  x 6 with mod (A) (able to activate quad, but weak hip flexor (L) -- 

more weak on (L) than (R)


(R) ankle pump x 10 with v.c. for partial range motion and cues to press into PF

against resistance


(R) PROM/AAROM ankle circles x 10 cw/ccw


(R) SAQ with tactile cues to quad with each rep with patient to initiate lifting

foot off mattress, AAROM/mod (A) to achieve full extension, then patient attem

pting eccentric control with descent


(R) hip abd/add x 10 min (A)


(R) heel slide x 10 with min (A) with cues to extend against resistance


(R) SLE x 6 with min (A)- mod (A)





Reaching with (R) UE to grasp sponge from therapist's hand x 5 - mod (A) at 

elbow x 4 reps, min (A) at elbow x 1 rep to reach target - then required max (A)

to open gip to place sponge in palm, able to then grasp and hold (I).





Assessment


Current Status:  Fair Progress


Patient with improved mood this pm, dizziness slightly less, but persistent.  

Excellent effort with (B)LE ex - motor planning issues (B), with hip flexion 

weakness more apparent (L) this pm compared to (R).





PT Long Term Goals


Long Term Goals


PT Long Term Goals Time Frame:  May 8, 2023


Roll Left & Right (QC):  4


Sit to Lying (QC):  4


Lying-Sitting on Side/Bed(QC):  4


Sit to Stand (QC):  2


Chair/Bed-to-Chair Xfer(QC):  2


Toilet Transfer (QC):  2


Car Transfer (QC):  2


Does the Patient Walk:  No and Walking Goal NOT indicated


Walk 10 feet (QC):  1


Walk 50ft with 2 Turns (QC):  88


Walk 150 ft (QC):  88


Walking 10ft on Uneven Surface:  88


1 Step (curb) (QC):  88


4 Steps (QC):  88


12 Steps (QC):  88


Picking up an Object (QC):  88


Does the Pt use WC or Scooter?:  Yes


Wheel 50 feet with 2 turns (QC:  5 (Patient able to use (L) LE and (B) LE's for 

w/c propulsion)


Type:  Manual


Wheel 150 feet:  5


Type:  Manual





PT Plan


Problem List


Problem List:  Activity Tolerance, Functional Strength, Safety, Balance, 

Transfer, Bed Mobility, ROM





Treatment/Plan


Treatment Plan:  Continue Plan of Care


Treatment Plan:  Bed Mobility, Concurrent Therapy, Education, Functional 

Activity Russ, Functional Strength, Group Therapy, Safety, Therapeutic 

Exercise, Transfers, Other (W/C mobility)


Treatment Duration:  May 8, 2023


Frequency:  At least 5 of 7 days/Wk (IRF)


Estimated Hrs Per Day:  1.5 hours per day


Patient and/or Family Agrees t:  Yes





Safety Risks/Education


Safety Risk Comments:  Wound issue contraindicates standing frame & sliding 

board transfer


Patient Education:  Correct Positioning, Instructions to Caregiver


Teaching Recipient:  Family


Teaching Methods:  Demonstration, Discussion


Recommend that patient/family utilize standing lift and/or afshan lift for safe 

transfers when patient returns home with family - she is unable to safely 

transfer without lift equipment at this time, and lift equipment will decrease 

likelihood of shear on buttocks/sacrum and protect skin from further wounds.





Discharge Recommendations


Therapy Discharge Recommendati:  24 Hour Supervision, Home & Family, Post Acute 

PT





Time


Time In:  1300


Time Out:  1330


DATE:  Mar 23, 2023


Total Billed Treatment Time:  30


Total Billed Treatment


2 Ex - 30'











Laurel Heredia PT               Mar 23, 2023 20:02

## 2023-03-23 NOTE — OCCUPATIONAL THER DAILY NOTE
OT Current Status-Daily Note


Subjective


Pt alert, lying in bed.  Pt stated that she felt woozy and just not right.  

Reported this to nrsg.





Mental Status/Objective


Patient Orientation:  Person, Place, Time, Situation


Attachments:  Drains (wound vac), [place] Catheter, IV





ADL-Treatment


Pt given wash clothe to wipe face and wash L hand for breakfast.  Pt positioned 

to eat breakfast.  Pt able to set up own meal by opening lids and using regular 

utensils to eat.  After session, pt lying in bed with call light/phone in reach.

 All needs met in room.


Therapy Code Descriptions/Definitions 





Functional [place] Measure:


0=Not Assessed/NA        4=Minimal Assistance


1=Total Assistance        5=Supervision or Setup


2=Maximal Assistance  6=Modified [place]


3=Moderate Assistance 7=Complete IndependenceSCALE: Activities may be completed 

with or without assistive devices.





6-Indepedent-patient completes the activity by him/herself with no assistance 

from a helper.


5-Set-up or Clean-up Assistance-helper sets up or cleans up; patient completes 

activity. [place] assists only prior to or  


    following the activity.


4-Supervision or Touching Assistance-helper provides verbal cues and/or 

touching/steadying and/or contact guard assistance as patient completes 

activity. Assistance may be provided   


    throughout the activity or intermittently.


3-Partial/Moderate Assistance-helper does LESS THAN HALF the effort. [place] 

lifts, holds or supports trunk or limbs, but provides less than half the effort.


2-Substantial/Maximal Assistance-helper does MORE THAN HALF the effort. [place] 

lifts or holds trunk or limbs and provides more than half the effort.


9-Onybjibbo-geblbz does ALL the effort. Patient does none of the effort to 

complete the activity. Or, the assistance of 2 or more helpers is required for 

the patient to complete the  


    activity.


If activity was not attempted, code reason:


7-Patient Refused.


9-Not Applicable-not attempted and the patient did not perform the activity 

before the current illness, exacerbation or injury.


10-Not Attempted due to Environmental Limitations-(lack of equipment, weather 

restraints, etc.).


88-Not Attempted due to Medical Conditions or Safety Concerns.


Eating (QC):  6





OT Short Term Goals


Short Term Goals


Time Frame:  Mar 28, 2023


Eating:  3


Toileting hygiene:  3


Shower/bathe self:  3


Lower body dressing:  3


Putting on/taking off footwear:  3





OT Long Term Goals


Long Term Goals


Time Frame:  2023


Acute change in mental status:  1


Inattention:  0


Disorganized thinkin


Altered level of consciousness:  0


Eating (QC):  5


Oral Hygiene (QC):  5


Toileting Hygiene (QC):  4


Shower/Bathe Self (QC):  4


Upper Body Dressing (QC):  5


Lower Body Dressing (QC):  4


On/Off Footwear (QC):  4


Additional Goals:  1-Demonstrate ADL Tasks, 2-Verbalize Understanding, 3-

ImproveStrength/Russ


1=Demonstrate adherence to instructed precautions during ADL tasks.


2=Patient will verbalize/demonstrate understanding of assistive 

devices/modifications for ADL.


3=Patient will improve strength/tolerance for activity to enable patient to 

perform ADL's.





OT Education/Plan


Problem List/Assessment


Assessment:  Decreased Activ Tolerance, Impaired Self-Care Skills





Discharge Recommendations


Plan/Recommendations:  Continue POC





Treatment Plan/Plan of Care


Patient would benefit from OT for education, treatment and training to promote 

independence in ADL's, mobility, safety and/or upper extremity function for 

ADL's.


Plan of Care:  ADL Retraining, Functional Mobility, Group Exercise/Act as Ind, 

UE Funct Exercise/Act, UE Neuromus Re-Ed/Coord, Visual/Perceptual Retrain, W/C 

Management Training


Treatment Duration:  2023


Frequency:  At least 5 of 7 days/Wk (IRF)


Estimated Hrs Per Day:  1.5 hours per day


Agreement:  Yes


Rehab Potential:  Guarded





Time


Start Time:  07:30


Stop Time:  08:00


DATE:  Mar 23, 2023


Total Time Billed (hr/min):  30


Billed Treatment Time


1 visit-ADL 2 (30 min)











ARTIE MC               Mar 23, 2023 12:38

## 2023-03-23 NOTE — OCCUPATIONAL THER DAILY NOTE
OT Current Status-Daily Note


Subjective


Pt alert, lying in bed.  Pt stated she continued to feel not right, woozy and 

dizzy.  Reported to nrsg, nrsg checked vitals and BS, all WNL.  Pt does agrees 

to therapy in bed.  PT/OT co-treat ([id number])-1st PT [id number], 2nd PT 1015-

1053.  Skills of 2 clinicians required to increase stamina and monitor pt's s

ymptoms during treatment.  PT focusing on bed mobility and B LE strengthening 

while OT focusing on R UE and functional bed mobility.





Mental Status/Objective


Patient Orientation:  Person, Place, Time, Situation


Attachments:  Drains, [place] Catheter, IV





ADL-Treatment


Therapy Code Descriptions/Definitions 





Functional [place] Measure:


0=Not Assessed/NA        4=Minimal Assistance


1=Total Assistance        5=Supervision or Setup


2=Maximal Assistance  6=Modified [place]


3=Moderate Assistance 7=Complete IndependenceSCALE: Activities may be completed 

with or without assistive devices.





6-Indepedent-patient completes the activity by him/herself with no assistance 

from a helper.


5-Set-up or Clean-up Assistance-helper sets up or cleans up; patient completes 

activity. [place] assists only prior to or  


    following the activity.


4-Supervision or Touching Assistance-helper provides verbal cues and/or 

touching/steadying and/or contact guard assistance as patient completes 

activity. Assistance may be provided   


    throughout the activity or intermittently.


3-Partial/Moderate Assistance-helper does LESS THAN HALF the effort. [place] 

lifts, holds or supports trunk or limbs, but provides less than half the effort.


2-Substantial/Maximal Assistance-helper does MORE THAN HALF the effort. [place] 

lifts or holds trunk or limbs and provides more than half the effort.


6-Clsvfvkqu-sgrrao does ALL the effort. Patient does none of the effort to 

complete the activity. Or, the assistance of 2 or more helpers is required for 

the patient to complete the  


    activity.


If activity was not attempted, code reason:


7-Patient Refused.


9-Not Applicable-not attempted and the patient did not perform the activity 

before the current illness, exacerbation or injury.


10-Not Attempted due to Environmental Limitations-(lack of equipment, weather 

restraints, etc.).


88-Not Attempted due to Medical Conditions or Safety Concerns.





Other Treatment


Pt states that she feels like she will fall if she attempts to get OOB.  Bed 

mobility and R UE AAROM completed.  Light resistance therafoam given to pt to 

work on  strength of R hand.  Pt is demonstrating strength with elbow 

flex/ext, finger flex/ext and minimal wrist flex/ext.  R shldr flex to ~165* 

with AAROM.  See PT notes for bed mobility and B LE strength/exercises.  After 

session, pt left in care of PT.





OT Short Term Goals


Short Term Goals


Time Frame:  Mar 28, 2023


Eating:  3


Toileting hygiene:  3


Shower/bathe self:  3


Lower body dressing:  3


Putting on/taking off footwear:  3





OT Long Term Goals


Long Term Goals


Time Frame:  2023


Acute change in mental status:  1


Inattention:  0


Disorganized thinkin


Altered level of consciousness:  0


Eating (QC):  5


Oral Hygiene (QC):  5


Toileting Hygiene (QC):  4


Shower/Bathe Self (QC):  4


Upper Body Dressing (QC):  5


Lower Body Dressing (QC):  4


On/Off Footwear (QC):  4


Additional Goals:  1-Demonstrate ADL Tasks, 2-Verbalize Understanding, 

3-ImproveStrength/Russ


1=Demonstrate adherence to instructed precautions during ADL tasks.


2=Patient will verbalize/demonstrate understanding of assistive 

devices/modifications for ADL.


3=Patient will improve strength/tolerance for activity to enable patient to 

perform ADL's.





OT Education/Plan


Problem List/Assessment


Assessment:  Impaired Bed Mobility, Impaired Self-Care Skills





Discharge Recommendations


Plan/Recommendations:  Continue POC





Treatment Plan/Plan of Care


Patient would benefit from OT for education, treatment and training to promote 

independence in ADL's, mobility, safety and/or upper extremity function for 

ADL's.


Plan of Care:  ADL Retraining, Functional Mobility, Group Exercise/Act as Ind, 

UE Funct Exercise/Act, UE Neuromus Re-Ed/Coord, Visual/Perceptual Retrain, W/C 

Management Training


Treatment Duration:  2023


Frequency:  At least 5 of 7 days/Wk (IRF)


Estimated Hrs Per Day:  1.5 hours per day


Agreement:  Yes


Rehab Potential:  Guarded





Time


Start Time:  10:00


Stop Time:  10:53


DATE:  Mar 23, 2023


Total Time Billed (hr/min):  53


Billed Treatment Time


1 visit-FA 4 (53 min) co-treat with 1-PT 15 min 2-PT 38 min











ARTIE MC               Mar 23, 2023 12:43

## 2023-03-23 NOTE — OCCUPATIONAL THER DAILY NOTE
OT Current Status-Daily Note


Subjective


Pt sleeping, woke to name.  Pt stated that it is hard for her to wake.  Pt 

agrees to therapy.





Mental Status/Objective


Patient Orientation:  Person, Place, Time, Situation


Attachments:  Drains (wound vac)





ADL-Treatment


Therapy Code Descriptions/Definitions 





Functional [place] Measure:


0=Not Assessed/NA        4=Minimal Assistance


1=Total Assistance        5=Supervision or Setup


2=Maximal Assistance  6=Modified [place]


3=Moderate Assistance 7=Complete IndependenceSCALE: Activities may be completed 

with or without assistive devices.





6-Indepedent-patient completes the activity by him/herself with no assistance 

from a helper.


5-Set-up or Clean-up Assistance-helper sets up or cleans up; patient completes 

activity. [place] assists only prior to or  


    following the activity.


4-Supervision or Touching Assistance-helper provides verbal cues and/or 

touching/steadying and/or contact guard assistance as patient completes 

activity. Assistance may be provided   


    throughout the activity or intermittently.


3-Partial/Moderate Assistance-helper does LESS THAN HALF the effort. [place] 

lifts, holds or supports trunk or limbs, but provides less than half the effort.


2-Substantial/Maximal Assistance-helper does MORE THAN HALF the effort. [place] 

lifts or holds trunk or limbs and provides more than half the effort.


7-Bwzlonkle-ojtvli does ALL the effort. Patient does none of the effort to 

complete the activity. Or, the assistance of 2 or more helpers is required for 

the patient to complete the  


    activity.


If activity was not attempted, code reason:


7-Patient Refused.


9-Not Applicable-not attempted and the patient did not perform the activity 

before the current illness, exacerbation or injury.


10-Not Attempted due to Environmental Limitations-(lack of equipment, weather 

restraints, etc.).


88-Not Attempted due to Medical Conditions or Safety Concerns.





Other Treatment


With FOB elevated, pt able to assist with R UE/LE to scoot up in bed.  Pt was 

then positioned off of wound area for comfort.  After session, pt lying in bed 

with call light/phone in reach.  Nrsg present in room.





OT Short Term Goals


Short Term Goals


Time Frame:  Mar 28, 2023


Eating:  3


Toileting hygiene:  3


Shower/bathe self:  3


Lower body dressing:  3


Putting on/taking off footwear:  3





OT Long Term Goals


Long Term Goals


Time Frame:  2023


Acute change in mental status:  1


Inattention:  0


Disorganized thinkin


Altered level of consciousness:  0


Eating (QC):  5


Oral Hygiene (QC):  5


Toileting Hygiene (QC):  4


Shower/Bathe Self (QC):  4


Upper Body Dressing (QC):  5


Lower Body Dressing (QC):  4


On/Off Footwear (QC):  4


Additional Goals:  1-Demonstrate ADL Tasks, 2-Verbalize Understanding, 3-

ImproveStrength/Russ


1=Demonstrate adherence to instructed precautions during ADL tasks.


2=Patient will verbalize/demonstrate understanding of assistive 

devices/modifications for ADL.


3=Patient will improve strength/tolerance for activity to enable patient to 

perform ADL's.





OT Education/Plan


Problem List/Assessment


Assessment:  Decreased Activ Tolerance, Decreased UE Strength, Impaired Bed 

Mobility, Impaired Self-Care Skills





Discharge Recommendations


Plan/Recommendations:  Continue POC





Treatment Plan/Plan of Care


Patient would benefit from OT for education, treatment and training to promote 

independence in ADL's, mobility, safety and/or upper extremity function for 

ADL's.


Plan of Care:  ADL Retraining, Functional Mobility, Group Exercise/Act as Ind, 

UE Funct Exercise/Act, UE Neuromus Re-Ed/Coord, Visual/Perceptual Retrain, W/C 

Management Training


Treatment Duration:  2023


Frequency:  At least 5 of 7 days/Wk (IRF)


Estimated Hrs Per Day:  1.5 hours per day


Agreement:  Yes


Rehab Potential:  Guarded





Time


Start Time:  07:00


Stop Time:  07:15


DATE:  Mar 23, 2023


Total Time Billed (hr/min):  15


Billed Treatment Time


1 visit-FA 1 (15 min)











ARTIE MC               Mar 23, 2023 12:35

## 2023-03-23 NOTE — PM&R PROGRESS NOTE
Subjective


HPI/CC On Admission


Date Seen by Provider:  Mar 23, 2023


Time Seen by Provider:  12:30


Subjective/Events-last exam


3/23/2023:


Improved status


No pain reported


Had an episode last evening of confusion and vision changes but vitals remained 

stable


Eating some but fluids minimal and UOP decreased so will initiate IVF gently








3/22/2023:


Much improved status 


Robles cath still in place


Urecholine maintained


Infection risk with in-dwelling cath


Very debilitated so unsure she could perform in/out caths at this time








3/21/2023:


No major events


Pain controlled


Dr Walker called me to update me on the MRI findings


No otseo noted under the wound








3/20/2023:


No major events


Improved transfers


Catheter removed but later could not void so replaced robles


Sugars monitored


Dr Walker consulted for wound








3/19/2023:


Much improved


Resting today


Family at bedside


No pain reported


Will DC catheter tomorrow








3/18/2023:


No major issues


DC tube feeding to prevent overfeeding


Dysarthria improved


Reviewed meds








3/17/2023:


Much improved


Participation is good


No falls


Pain is chronic neuropathy


Reinserted Robles catheter due to retention last night








3/16/2023:


Improved dramatically


Stood for 35 seconds with parallel bars


No pain reported except wound


Family at bedside








3/15/2023:


Improved overall


Family at bedside


Wound vac on hold due to bacteria in the wound culture


Dr Zamorano managing the IV abx 








3/14/2023:


Doing much better


Reviewed back history on her pre-existing debility:


10/5/22 gastric ulcer perforation but used wheelchair periodically prior to that

due to neuropathy


11/4/22 Decubitus ulcer admit stage III wound vac placed


1/29/23 used walker but wheelchair for long distances


Speech will see her today





Review of Systems


General:  Fatigue, Malaise





Objective


Exam


Vital Signs





Vital Signs








  Date Time  Temp Pulse Resp B/P (MAP) Pulse Ox O2 Delivery O2 Flow Rate FiO2


 


3/23/23 21:00     94 Room Air  


 


3/23/23 20:25 36.1 87 18 127/65 (85)    





Capillary Refill :


General Appearance:  No Apparent Distress, WD/WN, Anxious, Chronically ill


HEENT:  PERRL/EOMI, Normal ENT Inspection, Pharynx Normal


Neck:  Full Range of Motion, Normal Inspection, Non Tender, Supple, Carotid 

Bruit


Respiratory:  Chest Non Tender, Lungs Clear, Normal Breath Sounds, No Accessory 

Muscle Use, No Respiratory Distress


Cardiovascular:  Regular Rate, Rhythm, No Edema, No Gallop, No JVD, No Murmur, 

Normal Peripheral Pulses


Gastrointestinal:  Normal Bowel Sounds, No Organomegaly, No Pulsatile Mass, Non 

Tender, Soft


Back:  Normal Inspection, No CVA Tenderness, No Vertebral Tenderness


Extremity:  Normal Capillary Refill, Normal Inspection, Normal Range of Motion, 

Non Tender, No Calf Tenderness, No Pedal Edema


Neurologic/Psychiatric:  Alert, Oriented x3, CNs II-XII Norm as Tested, Abnormal

CNs II-XII, Depressed Affect, Facial Droop, Motor Weakness


Skin:  Normal Color, Warm/Dry


Lymphatic:  No Adenopathy





Results/Procedures


Lab


Patient resulted labs reviewed.





FIM


Transfers


Therapy Code Descriptions/Definitions 





Functional [place] Measure:


0=Not Assessed/NA        4=Minimal Assistance


1=Total Assistance        5=Supervision or Setup


2=Maximal Assistance  6=Modified [place]


3=Moderate Assistance 7=Complete IndependenceSCALE: Activities may be completed 

with or without assistive devices.





6-Indepedent-patient completes the activity by him/herself with no assistance 

from a helper.


5-Set-up or Clean-up Assistance-helper sets up or cleans up; patient completes 

activity. [place] assists only prior to or  


    following the activity.


4-Supervision or Touching Assistance-helper provides verbal cues and/or 

touching/steadying and/or contact guard assistance as patient completes 

activity. Assistance may be provided   


    throughout the activity or intermittently.


3-Partial/Moderate Assistance-helper does LESS THAN HALF the effort. [place] 

lifts, holds or supports trunk or limbs, but provides less than half the effort.


2-Substantial/Maximal Assistance-helper does MORE THAN HALF the effort. [place] 

lifts or holds trunk or limbs and provides more than half the effort.


4-Dwlqbwgiq-eujrqr does ALL the effort. Patient does none of the effort to 

complete the activity. Or, the assistance of 2 or more helpers is required for 

the patient to complete the  


    activity.


If activity was not attempted, code reason:


7-Patient Refused.


9-Not Applicable-not attempted and the patient did not perform the activity 

before the current illness, exacerbation or injury.


10-Not Attempted due to Environmental Limitations-(lack of equipment, weather 

restraints, etc.).


88-Not Attempted due to Medical Conditions or Safety Concerns.


Roll Left to Right (QC):  2


Sit to Lying (QC):  3


Sit to Stand (QC):  1


Chair/Bed-to-Chair Xfer(QC):  1


Car Transfer (QC):  88





Gait Training


Does the Patient Walk?:  No and Walking Goal NOT indicated


Walk 10 feet (QC):  88


Walk 50 ft with 2 Turns(QC):  88


Walk 150 ft (QC):  88


Walking 10ft/uneven surface-QC:  88





Wheelchair Training


Does the Pt Use a Wheelchair?:  Yes


Wheel 50 ft with 2 turns (QC):  3


Wheel 150 ft (QC):  4


Type of Wheelchair:  Manual





Stair Training


1 Step (curb) (QC):  88


4 Steps (QC):  88


12 Steps (QC):  88





Balance


Picking up an Object (QC):  88





ADL-Treatment


Eating (QC):  5


Oral Hygiene (QC):  6


Shower/Bathe Self (QC):  3


Upper Body Dressing (QC):  3


Lower Body Dressing (QC):  1


On/Off Footwear (QC):  1


Toileting Hygiene (QC):  1


Toilet Transfer (QC):  1





Assessment/Plan


Assessment and Plan


Assess & Plan/Chief Complaint


Assessment:


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


Infective Endocarditis of Atrial Valve


Dysarthria/expressive aphasia


Dysphagia with PEG tube


Right Knee Effusion 


Sacral Decubitus Ulcer with MRSA so started Vanc


Anemia-iron def so ordered Venofer


T2DM - ID


Hypothyroidism


Constipation


Hyperlipidemia


GERD


Chronic debility: (10/5/22 gastric ulcer perforation but used wheelchair 

periodically prior to that due to neuropathy, 11/4/22 Decubitus ulcer admit 

stage III wound vac placed, 1/29/23 used walker but wheelchair for long 

distances)


Urinary retention attempted to DC catheter 3/16/23 and required replacement of 

cath 3/17/23





Plan:


Monitor closely


Change Synthroid to PO


ST consult for dysphagia


Utilize PEG


PT OT





3/14/2023:


Monitor closely


Speech therapy to advanced diet if able





3/15/2023:


Monitor closely


Wound care





3/16/2023:


Dramatically improved


Wound care


IV abx





3/17/2023:


Urecholine


Monitor closely





3/18/2023:


DC tube feeding





3/19/2023:


DC catheter tomorrow








3/20/2023:


Wound care


Robles cath replaced





3/21/2023:


Monitor pain


IV abx





3/22/2023:


Maintain cath


Monitor closely





3/23/2023:


IVF for dehydration





(1) CVA (cerebral vascular accident)











COOKIE CHRISTIE DO                Mar 23, 2023 05:24

## 2023-03-23 NOTE — PHYSICAL THERAPY DAILY NOTE
PT Daily Note-Current


Subjective


Patient states she "doesn't feel right".  Nursing assessed BP, O2 sats and blood

sugars which were all WNL.  Patient is agreeable to work with therapy.  States 

she does have some pain in her (R) hip and at the sacral wound - wound vac in 

place.





Pain


Section J - Health Conditions


1. Rarely or not at all


2. Occasionally


3. Frequently


4. Almost constantly


8. Unable to answer


Pain Effect on Sleep:  1


Pain Interference with Therapy:  2


Pain Interference w/Day-to-Day:  2





Appearance


Resting in 1/4 SL toward (L), working with ALLEN when PT entered room.  [place], 

wound vac, O2 per nc.





Transfers


SCALE: Activities may be completed with or without assistive devices.





6-Indepedent-patient completes the activity by him/herself with no assistance 

from a helper.


5-Set-up or Clean-up Assistance-helper sets up or cleans up; patient completes 

activity. [place] assists only prior to or  


    following the activity.


4-Supervision or Touching Assistance-helper provides verbal cues and/or 

touching/steadying and/or contact guard assistance as patient completes 

activity. Assistance may be provided   


    throughout the activity or intermittently.


3-Partial/Moderate Assistance-helper does LESS THAN HALF the effort. [place] 

lifts, holds or supports trunk or limbs, but provides less than half the effort.


2-Substantial/Maximal Assistance-helper does MORE THAN HALF the effort. [place] 

lifts or holds trunk or limbs and provides more than half the effort.


7-Ewlmkxxrd-hhnmbw does ALL the effort. Patient does none of the effort to 

complete the activity. Or, the assistance of 2 or more helpers is required for 

the patient to complete the  


    activity.


If activity was not attempted, code reason:


7-Patient Refused.


9-Not Applicable-not attempted and the patient did not perform the activity 

before the current illness, exacerbation or injury.


10-Not Attempted due to Environmental Limitations-(lack of equipment, weather 

restraints, etc.).


88-Not Attempted due to Medical Conditions or Safety Concerns.





Weight Bearing


Weight Bearing/Tolerated


Weight Bearing/Tolerated





Exercises


(B) LE heel cord stretch 10 x :10 with cues for PF against resistance for each 

rep - demonstrates equal power with PF (B).


(L) LE PNF pattern D1F1 x 10with improved hip flexion noted with each rep - 

required cues for each repetition and min (A) for full hip flexion ROM.  More 

power demonstrated in extension than flexion.


Bridges with minimal lift x 5 with therapist assisting (B) legs to hooklying 

position and providing support at feet during bridges.  Multiple t.c./v.c./v.c. 

for bridge attempts.  This activity did make patient mildly SOB.


(L) hip clamshell x 10 with v.c/t.c. with each rep with improved hip ER with 

each rep.


Verbalized decreased hip discomfort following ex.





Treatments


Co-treatment with OT for bed mobility/positioning due to multiple v.c./v.c./t.c.

required by OT/PT disciplines for both UE and LE's  due to motor planning 

deficits and for patient comfort during bed mobility.


Patient mod (A) to roll (L), mod (A) to roll (R).


Scooting up in bed - dependent this a.m.





Assessment


Decreased activity tolerance due to dizziness, "not feeling like myself".  

Continues to be compliant with therapy and is motivated to improve.   Motor 

planning issues persist in (B) LE's.





PT Long Term Goals


Long Term Goals


PT Long Term Goals Time Frame:  May 8, 2023


Roll Left & Right (QC):  4


Sit to Lying (QC):  4


Lying-Sitting on Side/Bed(QC):  4


Sit to Stand (QC):  2


Chair/Bed-to-Chair Xfer(QC):  2


Toilet Transfer (QC):  2


Car Transfer (QC):  2


Does the Patient Walk:  No and Walking Goal NOT indicated


Walk 10 feet (QC):  1


Walk 50ft with 2 Turns (QC):  88


Walk 150 ft (QC):  88


Walking 10ft on Uneven Surface:  88


1 Step (curb) (QC):  88


4 Steps (QC):  88


12 Steps (QC):  88


Picking up an Object (QC):  88


Does the Pt use WC or Scooter?:  Yes


Wheel 50 feet with 2 turns (QC:  5 (Patient able to use (L) LE and (B) LE's for 

w/c propulsion)


Type:  Manual


Wheel 150 feet:  5


Type:  Manual





PT Plan


Treatment/Plan


Treatment Plan:  Continue Plan of Care


Treatment Plan:  Bed Mobility, Concurrent Therapy, Education, Functional 

Activity [place], Functional Strength, Group Therapy, Safety, Therapeutic 

Exercise, Transfers, Other (W/C mobility)


Treatment Duration:  May 8, 2023


Frequency:  At least 5 of 7 days/Wk (IRF)


Estimated Hrs Per Day:  1.5 hours per day


Patient and/or Family Agrees t:  Yes





Time


Time In:  1014


Time Out:  1100


DATE:  Mar 23, 2023


Total Billed Treatment Time:  46


Total Billed Treatment


38 minutes co-rx with ALLEN - 2 FA, 8 minute TE











Laurel Heredia PT               Mar 23, 2023 12:28

## 2023-03-24 VITALS — SYSTOLIC BLOOD PRESSURE: 102 MMHG | DIASTOLIC BLOOD PRESSURE: 62 MMHG

## 2023-03-24 VITALS — DIASTOLIC BLOOD PRESSURE: 64 MMHG | SYSTOLIC BLOOD PRESSURE: 108 MMHG

## 2023-03-24 RX ADMIN — DOCUSATE SODIUM SCH MG: 100 CAPSULE ORAL at 21:00

## 2023-03-24 RX ADMIN — NYSTATIN SCH GM: 100000 CREAM TOPICAL at 13:28

## 2023-03-24 RX ADMIN — INSULIN ASPART SCH UNIT: 100 INJECTION, SOLUTION INTRAVENOUS; SUBCUTANEOUS at 06:09

## 2023-03-24 RX ADMIN — Medication SCH EACH: at 23:00

## 2023-03-24 RX ADMIN — SODIUM CHLORIDE SCH MLS/HR: 900 INJECTION, SOLUTION INTRAVENOUS at 04:49

## 2023-03-24 RX ADMIN — PHENAZOPYRIDINE HYDROCHLORIDE SCH MG: 100 TABLET ORAL at 17:55

## 2023-03-24 RX ADMIN — POLYETHYLENE GLYCOL (3350) SCH GM: 17 POWDER, FOR SOLUTION ORAL at 21:00

## 2023-03-24 RX ADMIN — INSULIN ASPART SCH UNIT: 100 INJECTION, SOLUTION INTRAVENOUS; SUBCUTANEOUS at 15:40

## 2023-03-24 RX ADMIN — INSULIN ASPART SCH UNIT: 100 INJECTION, SOLUTION INTRAVENOUS; SUBCUTANEOUS at 11:14

## 2023-03-24 RX ADMIN — LEVOTHYROXINE SODIUM SCH MCG: 100 TABLET ORAL at 05:57

## 2023-03-24 RX ADMIN — NYSTATIN SCH GM: 100000 CREAM TOPICAL at 11:11

## 2023-03-24 RX ADMIN — DOCUSATE SODIUM AND SENNOSIDES SCH EA: 8.6; 5 TABLET, FILM COATED ORAL at 09:29

## 2023-03-24 RX ADMIN — BETHANECHOL CHLORIDE SCH MG: 25 TABLET ORAL at 16:35

## 2023-03-24 RX ADMIN — INSULIN ASPART SCH UNIT: 100 INJECTION, SOLUTION INTRAVENOUS; SUBCUTANEOUS at 21:00

## 2023-03-24 RX ADMIN — PREGABALIN SCH MG: 75 CAPSULE ORAL at 23:00

## 2023-03-24 RX ADMIN — PREGABALIN SCH MG: 75 CAPSULE ORAL at 09:42

## 2023-03-24 RX ADMIN — MELATONIN 3 MG ORAL TABLET SCH MG: 3 TABLET ORAL at 23:00

## 2023-03-24 RX ADMIN — VANCOMYCIN HYDROCHLORIDE SCH MLS/HR: 500 INJECTION, POWDER, LYOPHILIZED, FOR SOLUTION INTRAVENOUS at 13:27

## 2023-03-24 RX ADMIN — SUCRALFATE SCH GM: 1 TABLET ORAL at 23:00

## 2023-03-24 RX ADMIN — FAMOTIDINE SCH MG: 20 TABLET, FILM COATED ORAL at 23:01

## 2023-03-24 RX ADMIN — BETHANECHOL CHLORIDE SCH MG: 25 TABLET ORAL at 05:57

## 2023-03-24 RX ADMIN — POLYETHYLENE GLYCOL (3350) SCH GM: 17 POWDER, FOR SOLUTION ORAL at 09:30

## 2023-03-24 RX ADMIN — LIDOCAINE SCH EA: 50 PATCH CUTANEOUS at 09:42

## 2023-03-24 RX ADMIN — SUCRALFATE SCH GM: 1 TABLET ORAL at 11:11

## 2023-03-24 RX ADMIN — BETHANECHOL CHLORIDE SCH MG: 25 TABLET ORAL at 23:01

## 2023-03-24 RX ADMIN — DOCUSATE SODIUM SCH MG: 100 CAPSULE ORAL at 09:30

## 2023-03-24 RX ADMIN — MICONAZOLE NITRATE SCH APPLIC: 20 POWDER TOPICAL at 22:58

## 2023-03-24 RX ADMIN — MICONAZOLE NITRATE SCH APPLIC: 20 POWDER TOPICAL at 09:42

## 2023-03-24 RX ADMIN — POTASSIUM CHLORIDE SCH MEQ: 750 TABLET, FILM COATED, EXTENDED RELEASE ORAL at 16:35

## 2023-03-24 RX ADMIN — FAMOTIDINE SCH MG: 20 TABLET, FILM COATED ORAL at 09:42

## 2023-03-24 RX ADMIN — POTASSIUM CHLORIDE SCH MEQ: 750 TABLET, FILM COATED, EXTENDED RELEASE ORAL at 09:42

## 2023-03-24 RX ADMIN — ACETAMINOPHEN PRN MG: 325 TABLET ORAL at 14:50

## 2023-03-24 RX ADMIN — DOCUSATE SODIUM AND SENNOSIDES SCH EA: 8.6; 5 TABLET, FILM COATED ORAL at 21:00

## 2023-03-24 RX ADMIN — AMITRIPTYLINE HYDROCHLORIDE SCH MG: 25 TABLET, FILM COATED ORAL at 23:00

## 2023-03-24 RX ADMIN — BETHANECHOL CHLORIDE SCH MG: 25 TABLET ORAL at 11:11

## 2023-03-24 RX ADMIN — FOLIC ACID SCH MG: 1 TABLET ORAL at 09:42

## 2023-03-24 RX ADMIN — NYSTATIN SCH GM: 100000 CREAM TOPICAL at 22:58

## 2023-03-24 RX ADMIN — Medication SCH MCG: at 05:57

## 2023-03-24 RX ADMIN — SUCRALFATE SCH GM: 1 TABLET ORAL at 16:35

## 2023-03-24 RX ADMIN — Medication SCH EACH: at 09:42

## 2023-03-24 RX ADMIN — SUCRALFATE SCH GM: 1 TABLET ORAL at 05:57

## 2023-03-24 RX ADMIN — ENOXAPARIN SODIUM SCH MG: 100 INJECTION SUBCUTANEOUS at 16:35

## 2023-03-24 NOTE — PM&R PROGRESS NOTE
Subjective


HPI/CC On Admission


Date Seen by Provider:  Mar 24, 2023


Time Seen by Provider:  12:30


Subjective/Events-last exam


3/24/2023:


Doing well


 at bedside


No neuro deficits last night


Pain comes and goes with chronic neuropathy


HLIVF








3/23/2023:


Improved status


No pain reported


Had an episode last evening of confusion and vision changes but vitals remained 

stable


Eating some but fluids minimal and UOP decreased so will initiate IVF gently








3/22/2023:


Much improved status 


Robles cath still in place


Urecholine maintained


Infection risk with in-dwelling cath


Very debilitated so unsure she could perform in/out caths at this time








3/21/2023:


No major events


Pain controlled


Dr Walker called me to update me on the MRI findings


No otseo noted under the wound








3/20/2023:


No major events


Improved transfers


Catheter removed but later could not void so replaced robles


Sugars monitored


Dr Walker consulted for wound








3/19/2023:


Much improved


Resting today


Family at bedside


No pain reported


Will DC catheter tomorrow








3/18/2023:


No major issues


DC tube feeding to prevent overfeeding


Dysarthria improved


Reviewed meds








3/17/2023:


Much improved


Participation is good


No falls


Pain is chronic neuropathy


Reinserted Robles catheter due to retention last night








3/16/2023:


Improved dramatically


Stood for 35 seconds with parallel bars


No pain reported except wound


Family at bedside








3/15/2023:


Improved overall


Family at bedside


Wound vac on hold due to bacteria in the wound culture


Dr Zamorano managing the IV abx 








3/14/2023:


Doing much better


Reviewed back history on her pre-existing debility:


10/5/22 gastric ulcer perforation but used wheelchair periodically prior to that

due to neuropathy


11/4/22 Decubitus ulcer admit stage III wound vac placed


1/29/23 used walker but wheelchair for long distances


Speech will see her today





Review of Systems


General:  Fatigue, Malaise





Objective


Exam


Vital Signs





Vital Signs








  Date Time  Temp Pulse Resp B/P (MAP) Pulse Ox O2 Delivery O2 Flow Rate FiO2


 


3/24/23 12:15      Room Air 0.00 


 


3/24/23 09:00     94   


 


3/24/23 07:47 35.9 86 14 102/62 (75)    





Capillary Refill :


General Appearance:  No Apparent Distress, WD/WN, Anxious, Chronically ill


HEENT:  PERRL/EOMI, Normal ENT Inspection, Pharynx Normal


Neck:  Full Range of Motion, Normal Inspection, Non Tender, Supple, Carotid 

Bruit


Respiratory:  Chest Non Tender, Lungs Clear, Normal Breath Sounds, No Accessory 

Muscle Use, No Respiratory Distress


Cardiovascular:  Regular Rate, Rhythm, No Edema, No Gallop, No JVD, No Murmur, 

Normal Peripheral Pulses


Gastrointestinal:  Normal Bowel Sounds, No Organomegaly, No Pulsatile Mass, Non 

Tender, Soft


Back:  Normal Inspection, No CVA Tenderness, No Vertebral Tenderness


Extremity:  Normal Capillary Refill, Normal Inspection, Normal Range of Motion, 

Non Tender, No Calf Tenderness, No Pedal Edema


Neurologic/Psychiatric:  Alert, Oriented x3, CNs II-XII Norm as Tested, Abnormal

CNs II-XII, Depressed Affect, Facial Droop, Motor Weakness


Skin:  Normal Color, Warm/Dry


Lymphatic:  No Adenopathy





Results/Procedures


Lab


Patient resulted labs reviewed.





FIM


Transfers


Therapy Code Descriptions/Definitions 





Functional [place] Measure:


0=Not Assessed/NA        4=Minimal Assistance


1=Total Assistance        5=Supervision or Setup


2=Maximal Assistance  6=Modified [place]


3=Moderate Assistance 7=Complete IndependenceSCALE: Activities may be completed 

with or without assistive devices.





6-Indepedent-patient completes the activity by him/herself with no assistance 

from a helper.


5-Set-up or Clean-up Assistance-helper sets up or cleans up; patient completes 

activity. [place] assists only prior to or  


    following the activity.


4-Supervision or Touching Assistance-helper provides verbal cues and/or 

touching/steadying and/or contact guard assistance as patient completes 

activity. Assistance may be provided   


    throughout the activity or intermittently.


3-Partial/Moderate Assistance-helper does LESS THAN HALF the effort. [place] 

lifts, holds or supports trunk or limbs, but provides less than half the effort.


2-Substantial/Maximal Assistance-helper does MORE THAN HALF the effort. [place] 

lifts or holds trunk or limbs and provides more than half the effort.


9-Phnprtytm-ktnjap does ALL the effort. Patient does none of the effort to c

omplete the activity. Or, the assistance of 2 or more helpers is required for 

the patient to complete the  


    activity.


If activity was not attempted, code reason:


7-Patient Refused.


9-Not Applicable-not attempted and the patient did not perform the activity 

before the current illness, exacerbation or injury.


10-Not Attempted due to Environmental Limitations-(lack of equipment, weather 

restraints, etc.).


88-Not Attempted due to Medical Conditions or Safety Concerns.


Roll Left to Right (QC):  2


Sit to Lying (QC):  3


Sit to Stand (QC):  1


Chair/Bed-to-Chair Xfer(QC):  1


Car Transfer (QC):  88





Gait Training


Does the Patient Walk?:  No and Walking Goal NOT indicated


Walk 10 feet (QC):  88


Walk 50 ft with 2 Turns(QC):  88


Walk 150 ft (QC):  88


Walking 10ft/uneven surface-QC:  88





Wheelchair Training


Does the Pt Use a Wheelchair?:  Yes


Wheel 50 ft with 2 turns (QC):  3


Wheel 150 ft (QC):  4


Type of Wheelchair:  Manual





Stair Training


1 Step (curb) (QC):  88


4 Steps (QC):  88


12 Steps (QC):  88





Balance


Picking up an Object (QC):  88





ADL-Treatment


Eating (QC):  6


Oral Hygiene (QC):  6


Shower/Bathe Self (QC):  3


Upper Body Dressing (QC):  3


Lower Body Dressing (QC):  1


On/Off Footwear (QC):  1


Toileting Hygiene (QC):  1


Toilet Transfer (QC):  1





Assessment/Plan


Assessment and Plan


Assess & Plan/Chief Complaint


Assessment:


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


Infective Endocarditis of Atrial Valve


Dysarthria/expressive aphasia


Dysphagia with PEG tube


Right Knee Effusion 


Sacral Decubitus Ulcer with MRSA so started Vanc


Anemia-iron def so ordered Venofer


T2DM - ID


Hypothyroidism


Constipation


Hyperlipidemia


GERD


Chronic debility: (10/5/22 gastric ulcer perforation but used wheelchair 

periodically prior to that due to neuropathy, 11/4/22 Decubitus ulcer admit 

stage III wound vac placed, 1/29/23 used walker but wheelchair for long 

distances)


Urinary retention attempted to DC catheter 3/16/23 and required replacement of 

cath 3/17/23





Plan:


Monitor closely


Change Synthroid to PO


ST consult for dysphagia


Utilize PEG


PT OT





3/14/2023:


Monitor closely


Speech therapy to advanced diet if able





3/15/2023:


Monitor closely


Wound care





3/16/2023:


Dramatically improved


Wound care


IV abx





3/17/2023:


Urecholine


Monitor closely





3/18/2023:


DC tube feeding





3/19/2023:


DC catheter tomorrow








3/20/2023:


Wound care


Robles cath replaced





3/21/2023:


Monitor pain


IV abx





3/22/2023:


Maintain cath


Monitor closely





3/23/2023:


IVF for dehydration





3/24/2023:


HLIVF





(1) CVA (cerebral vascular accident)











COOKIE CHRISTIE DO                Mar 24, 2023 06:28

## 2023-03-24 NOTE — PHYSICAL THERAPY DAILY NOTE
PT Daily Note-Current


Subjective


Patient alert, upbeat and just finished with ST.  Agreeable to out of bed 

activity today due to no dizziness this a.m.





Pain


Section J - Health Conditions


1. Rarely or not at all


2. Occasionally


3. Frequently


4. Almost constantly


8. Unable to answer


Pain Effect on Sleep:  1


Pain Interference with Therapy:  2


Pain Interference w/Day-to-Day:  2





Appearance


Wound vac in place, IV (L) UE, robles cath





Transfers


SCALE: Activities may be completed with or without assistive devices.





6-Indepedent-patient completes the activity by him/herself with no assistance 

from a helper.


5-Set-up or Clean-up Assistance-helper sets up or cleans up; patient completes 

activity. [place] assists only prior to or  


    following the activity.


4-Supervision or Touching Assistance-helper provides verbal cues and/or 

touching/steadying and/or contact guard assistance as patient completes 

activity. Assistance may be provided   


    throughout the activity or intermittently.


3-Partial/Moderate Assistance-helper does LESS THAN HALF the effort. [place] 

lifts, holds or supports trunk or limbs, but provides less than half the effort.


2-Substantial/Maximal Assistance-helper does MORE THAN HALF the effort. [place] 

lifts or holds trunk or limbs and provides more than half the effort.


7-Zpzpsippu-rfokim does ALL the effort. Patient does none of the effort to 

complete the activity. Or, the assistance of 2 or more helpers is required for 

the patient to complete the  


    activity.


If activity was not attempted, code reason:


7-Patient Refused.


9-Not Applicable-not attempted and the patient did not perform the activity 

before the current illness, exacerbation or injury.


10-Not Attempted due to Environmental Limitations-(lack of equipment, weather 

restraints, etc.).


88-Not Attempted due to Medical Conditions or Safety Concerns.


Lying to Sitting/Side of Bed(Q:  3 (Mod (A) of 1 to sit to EOB for trunk assist 

after moving (L) LE off edge of bed and moving (R) leg off with min (A).)


Sit to Stand (QC):  1 (Performed with sit<>stand lift.  educated on use 

and assisted with prep of patient/lift prior to standing.)


Chair/Bed-to-Chair Xfer(QC):  1 (sit>stand lift)


Good static sitting balance sitting EOB. Patient was able to tolerate standing 

in sit>stand lift x 5 minutes without dizziness - patient focused on (R) 

open/close  while holding onto lift x 15 reps and performed 10 reps with max

v.c./t.c. of lumbar extension/hip extension(minimal motion) while standing. Had 

no c/o pain while standing in standing lift.   Patient and  educated on 

continued need of standing lift when she returns home for safe transfers, but 

also to promote weight bearing thru (B) LE to maintain strong bone health and 

prevent LE muscle atrophy. Discussed with patient and  that due to her 

CVA, Chiqui is unable to move from sit to standing, nor transfer 

bed<>wheelchair without mechanical assistance at this time.  The sit>stand lift 

is recommended/being ordered to help improve ADL's and to provide safe, 

functional transfers.  Discussed with patient/ that we also recommend a 

hospital bed with a soft-care mattress as Chiqui requires positioning of her 

body to accommodate her current sacral wound and hemiplegic (R) side, which 

cannot be provided by a traditional bed.  She will require frequent changes in 

body position to prevent decubitus ulcers, as her bed mobility requires mod-max 

(A) at this time.





Weight Bearing


Weight Bearing/Tolerated


Weight Bearing/Tolerated





Wheelchair Training


Does the Pt Use a Wheelchair?:  Yes


Patient propelled w/c using (B) UE's with mod (A) for (R) UE placement and  

on rim with each rep.  Patient able to slide hand up rim (retract scapula) on 

(R) without assist.  Patient then able to push with (B) UE's - 10' propulsion.


Propulsion of w/c 50' with (L) UE and (B) LEs SBA with prn v.c. to avoid 

drifting (R).  W/C propulsion discussed with patient/.  Patient asked why

it was so difficult to propel using her (R) UE - explained it was a result of 

her stroke/(R) UE weakness.





PT Long Term Goals


Long Term Goals


PT Long Term Goals Time Frame:  May 8, 2023


Roll Left & Right (QC):  4


Sit to Lying (QC):  4


Lying-Sitting on Side/Bed(QC):  4


Sit to Stand (QC):  2


Chair/Bed-to-Chair Xfer(QC):  2


Toilet Transfer (QC):  2


Car Transfer (QC):  2


Does the Patient Walk:  No and Walking Goal NOT indicated


Walk 10 feet (QC):  1


Walk 50ft with 2 Turns (QC):  88


Walk 150 ft (QC):  88


Walking 10ft on Uneven Surface:  88


1 Step (curb) (QC):  88


4 Steps (QC):  88


12 Steps (QC):  88


Picking up an Object (QC):  88


Does the Pt use WC or Scooter?:  Yes


Wheel 50 feet with 2 turns (QC:  5 (Patient able to use (L) LE and (B) LE's for 

w/c propulsion)


Type:  Manual


Wheel 150 feet:  5


Type:  Manual





PT Plan


Treatment/Plan


Treatment Plan:  Continue Plan of Care


Treatment Plan:  Bed Mobility, Concurrent Therapy, Education, Functional 

Activity Russ, Functional Strength, Group Therapy, Safety, Therapeutic 

Exercise, Transfers, Other (W/C mobility)


Treatment Duration:  May 8, 2023


Frequency:  At least 5 of 7 days/Wk (IRF)


Estimated Hrs Per Day:  1.5 hours per day


Patient and/or Family Agrees t:  Yes





Safety Risks/Education


Patient Education:  Transfer Techniques, W/C Management, Instructions to 

Caregiver, Safety Issues


Teaching Recipient:  Patient, Significant Other


Teaching Methods:  Demonstration, Discussion


Response to Teaching:  Verbalize Understanding, Return Demonstration


 assist with transfer technique with standing lift and demonstrated 

understanding.  May need additional training with new lift if sling is attached 

in a different fashion.





Time


Time In:  1000


Time Out:  1038


DATE:  Mar 24, 2023


Total Billed Treatment Time:  38


Total Billed Treatment


38' -- 23 FA, 15 W/Laurel Boyle PT               Mar 24, 2023 12:42

## 2023-03-24 NOTE — OCCUPATIONAL THER DAILY NOTE
OT Current Status-Daily Note


Subjective


Pt alert, lying in bed.  Pt states that she is feeling much better than 

yesterday.  Pt's  in room.  Pt agrees to therapy.





Mental Status/Objective


Patient Orientation:  Person, Place, Time, Situation


Attachments:  Drains (wound vac), Clayton Catheter, IV





ADL-Treatment


Pt agrees to shower.  Pt is able to open containers/packages with increased time

and uses regular utensils to eat.  Mod A for supine <--> EOB, sitting EOB 

independent.  Sit to stand lift for all transfers.  Pt sat 100% of the time to 

complete shower on rolling shower chair using grabbars and hand held shower to 

bathe all areas except lower legs/feet and buttocks.  Min A to thread R UE into 

sleeve then pt completed rest of UBD sequence.  Dependent for lower body and 

footwear.  Independent sitting at sink for oral care.  Dependent for toileting. 

After session, pt lying in bed eating breakfast.  Call light/phone in reach.  

All needs met in room.


Therapy Code Descriptions/Definitions 





Functional [place] Measure:


0=Not Assessed/NA        4=Minimal Assistance


1=Total Assistance        5=Supervision or Setup


2=Maximal Assistance  6=Modified [place]


3=Moderate Assistance 7=Complete IndependenceSCALE: Activities may be completed 

with or without assistive devices.





6-Indepedent-patient completes the activity by him/herself with no assistance 

from a helper.


5-Set-up or Clean-up Assistance-helper sets up or cleans up; patient completes 

activity. [place] assists only prior to or  


    following the activity.


4-Supervision or Touching Assistance-helper provides verbal cues and/or 

touching/steadying and/or contact guard assistance as patient completes 

activity. Assistance may be provided   


    throughout the activity or intermittently.


3-Partial/Moderate Assistance-helper does LESS THAN HALF the effort. [place] 

lifts, holds or supports trunk or limbs, but provides less than half the effort.


2-Substantial/Maximal Assistance-helper does MORE THAN HALF the effort. [place] 

lifts or holds trunk or limbs and provides more than half the effort.


8-Gchqrnzcn-hidvwy does ALL the effort. Patient does none of the effort to 

complete the activity. Or, the assistance of 2 or more helpers is required for 

the patient to complete the  


    activity.


If activity was not attempted, code reason:


7-Patient Refused.


9-Not Applicable-not attempted and the patient did not perform the activity 

before the current illness, exacerbation or injury.


10-Not Attempted due to Environmental Limitations-(lack of equipment, weather 

restraints, etc.).


88-Not Attempted due to Medical Conditions or Safety Concerns.


Eating (QC):  6


Oral Hygiene (QC):  6


Shower/Bathe Self (QC):  3


Upper Body Dressing (QC):  3


Lower Body Dressing (QC):  1


On/Off Footwear:  1


Toileting Hygiene (QC):  1


Toilet Transfer (QC):  1





OT Short Term Goals


Short Term Goals


Time Frame:  Mar 28, 2023


Eating:  3


Toileting hygiene:  3


Shower/bathe self:  3


Lower body dressing:  3


Putting on/taking off footwear:  3





OT Long Term Goals


Long Term Goals


Time Frame:  2023


Acute change in mental status:  1


Inattention:  0


Disorganized thinkin


Altered level of consciousness:  0


Eating (QC):  5


Oral Hygiene (QC):  5


Toileting Hygiene (QC):  4


Shower/Bathe Self (QC):  4


Upper Body Dressing (QC):  5


Lower Body Dressing (QC):  4


On/Off Footwear (QC):  4


Additional Goals:  1-Demonstrate ADL Tasks, 2-Verbalize Understanding, 3-I

mproveStrength/Russ


1=Demonstrate adherence to instructed precautions during ADL tasks.


2=Patient will verbalize/demonstrate understanding of assistive 

devices/modifications for ADL.


3=Patient will improve strength/tolerance for activity to enable patient to 

perform ADL's.





OT Education/Plan


Problem List/Assessment


Assessment:  Decreased Activ Tolerance, Decreased UE Strength, Dependent 

Transfers, Impaired Bed Mobility, Impaired Coordination, Impaired Funct Balance,

Impaired Self-Care Skills, Restricted Funct UE ROM





Discharge Recommendations


Plan/Recommendations:  Continue POC





Treatment Plan/Plan of Care


Patient would benefit from OT for education, treatment and training to promote 

independence in ADL's, mobility, safety and/or upper extremity function for 

ADL's.


Plan of Care:  ADL Retraining, Functional Mobility, Group Exercise/Act as Ind, 

UE Funct Exercise/Act, UE Neuromus Re-Ed/Coord, Visual/Perceptual Retrain, W/C 

Management Training


Treatment Duration:  2023


Frequency:  At least 5 of 7 days/Wk (IRF)


Estimated Hrs Per Day:  1.5 hours per day


Agreement:  Yes


Rehab Potential:  Guarded





Time


Start Time:  07:00


Stop Time:  08:00


DATE:  Mar 24, 2023


Total Time Billed (hr/min):  60


Billed Treatment Time


1 visit-ADL 4 (60 min)











ARTIE MC               Mar 24, 2023 10:16

## 2023-03-24 NOTE — PHYSICAL THERAPY DAILY NOTE
PT Daily Note-Current


Pain


Section J - Health Conditions


1. Rarely or not at all


2. Occasionally


3. Frequently


4. Almost constantly


8. Unable to answer


Pain Effect on Sleep:  1


Pain Interference with Therapy:  2


Pain Interference w/Day-to-Day:  2





Mental Status


Attachments:  Drains, Clayton Catheter, IV





Transfers


SCALE: Activities may be completed with or without assistive devices.





6-Indepedent-patient completes the activity by him/herself with no assistance 

from a helper.


5-Set-up or Clean-up Assistance-helper sets up or cleans up; patient completes 

activity. [place] assists only prior to or  


    following the activity.


4-Supervision or Touching Assistance-helper provides verbal cues and/or 

touching/steadying and/or contact guard assistance as patient completes 

activity. Assistance may be provided   


    throughout the activity or intermittently.


3-Partial/Moderate Assistance-helper does LESS THAN HALF the effort. [place] 

lifts, holds or supports trunk or limbs, but provides less than half the effort.


2-Substantial/Maximal Assistance-helper does MORE THAN HALF the effort. [place] 

lifts or holds trunk or limbs and provides more than half the effort.


5-Vlbkfhnrd-ldtgkd does ALL the effort. Patient does none of the effort to 

complete the activity. Or, the assistance of 2 or more helpers is required for 

the patient to complete the  


    activity.


If activity was not attempted, code reason:


7-Patient Refused.


9-Not Applicable-not attempted and the patient did not perform the activity b

efore the current illness, exacerbation or injury.


10-Not Attempted due to Environmental Limitations-(lack of equipment, weather 

restraints, etc.).


88-Not Attempted due to Medical Conditions or Safety Concerns.


Sit to Lying (QC):  1


Chair/Bed-to-Chair Xfer(QC):  1


Sit to stand lift utilized for w/c to bed transfer.  Education provided to 

patient and her .  Pt able to assist  in accurately applying 

transfer lift belt and manage lines. Assisted patient to supine from sitting 

with assist of .  Pt was able to engage core and assist in lifting legs.





Weight Bearing


Weight Bearing/Tolerated


Weight Bearing/Tolerated





Wheelchair Training


W/C training for patient and her .  Propelled 3 bouts of 50 feet 

alternating between propulsion with left UE and (B) feet and use of (B) UEs.  

Able to self propel with (B) LEs and left  UE with occasional cues to correct 

drifting to the right.  Practiced (L) hand turns using UEs and LEs.





Exercises


Performed Seated isometric glute and quad contractions patient holding wall rail

and initiating sit to stand.  10 reps of 5 seconds


Seated (R) LAQ, Ankle pumps, ham curls all x 10 with active assist through full 

range


Seated manual resisit (R) hip extension and hip flexion x 10 ea


(R) LE isometric glute and quad contraction leg pressing into exercise ball 10 x

5 seconds





Assessment


Current Status:  Fair Progress


Pt was able to contract quads and glutes during attempted sit to stand.  

Insufficient force generation to come to standing.  Educated that this is a good

exercise for pressure reduction when sitting in w/c.





PT Long Term Goals


Long Term Goals


PT Long Term Goals Time Frame:  May 8, 2023


Roll Left & Right (QC):  4


Sit to Lying (QC):  4


Lying-Sitting on Side/Bed(QC):  4


Sit to Stand (QC):  2


Chair/Bed-to-Chair Xfer(QC):  2


Toilet Transfer (QC):  2


Car Transfer (QC):  2


Does the Patient Walk:  No and Walking Goal NOT indicated


Walk 10 feet (QC):  1


Walk 50ft with 2 Turns (QC):  88


Walk 150 ft (QC):  88


Walking 10ft on Uneven Surface:  88


1 Step (curb) (QC):  88


4 Steps (QC):  88


12 Steps (QC):  88


Picking up an Object (QC):  88


Does the Pt use WC or Scooter?:  Yes


Wheel 50 feet with 2 turns (QC:  5 (Patient able to use (L) LE and (B) LE's for 

w/c propulsion)


Type:  Manual


Wheel 150 feet:  5


Type:  Manual





PT Plan


Treatment/Plan


Treatment Plan:  Continue Plan of Care


Treatment Plan:  Bed Mobility, Concurrent Therapy, Education, Functional 

Activity Russ, Functional Strength, Group Therapy, Safety, Therapeutic 

Exercise, Transfers, Other (W/C mobility)


Treatment Duration:  May 8, 2023


Frequency:  At least 5 of 7 days/Wk (IRF)


Estimated Hrs Per Day:  1.5 hours per day


Patient and/or Family Agrees t:  Yes





Time


Time In:  1040


Time Out:  1115


DATE:  Mar 24, 2023


Total Billed Treatment Time:  35


Total Billed Treatment


visit, FA 15 min, Ther ex 20 min











MARY LEE PT                Mar 24, 2023 11:30

## 2023-03-25 VITALS — DIASTOLIC BLOOD PRESSURE: 66 MMHG | SYSTOLIC BLOOD PRESSURE: 108 MMHG

## 2023-03-25 VITALS — DIASTOLIC BLOOD PRESSURE: 66 MMHG | SYSTOLIC BLOOD PRESSURE: 111 MMHG

## 2023-03-25 LAB
ALBUMIN SERPL-MCNC: 2.6 GM/DL (ref 3.2–4.5)
ALP SERPL-CCNC: 73 U/L (ref 40–136)
ALT SERPL-CCNC: 10 U/L (ref 0–55)
BASOPHILS # BLD AUTO: 0.1 10^3/UL (ref 0–0.1)
BASOPHILS NFR BLD AUTO: 1 % (ref 0–10)
BILIRUB SERPL-MCNC: 0.3 MG/DL (ref 0.1–1)
BUN/CREAT SERPL: 23
CALCIUM SERPL-MCNC: 8.6 MG/DL (ref 8.5–10.1)
CHLORIDE SERPL-SCNC: 111 MMOL/L (ref 98–107)
CO2 SERPL-SCNC: 23 MMOL/L (ref 21–32)
CREAT SERPL-MCNC: 0.52 MG/DL (ref 0.6–1.3)
EOSINOPHIL # BLD AUTO: 0.2 10^3/UL (ref 0–0.3)
EOSINOPHIL NFR BLD AUTO: 4 % (ref 0–10)
GFR SERPLBLD BASED ON 1.73 SQ M-ARVRAT: 108 ML/MIN
GLUCOSE SERPL-MCNC: 115 MG/DL (ref 70–105)
HCT VFR BLD CALC: 30 % (ref 35–52)
HGB BLD-MCNC: 9.1 G/DL (ref 11.5–16)
LYMPHOCYTES # BLD AUTO: 1.2 10^3/UL (ref 1–4)
LYMPHOCYTES NFR BLD AUTO: 25 % (ref 12–44)
MANUAL DIFFERENTIAL PERFORMED BLD QL: NO
MCH RBC QN AUTO: 24 PG (ref 25–34)
MCHC RBC AUTO-ENTMCNC: 30 G/DL (ref 32–36)
MCV RBC AUTO: 81 FL (ref 80–99)
MONOCYTES # BLD AUTO: 0.3 10^3/UL (ref 0–1)
MONOCYTES NFR BLD AUTO: 7 % (ref 0–12)
NEUTROPHILS # BLD AUTO: 3 10^3/UL (ref 1.8–7.8)
NEUTROPHILS NFR BLD AUTO: 62 % (ref 42–75)
PLATELET # BLD: 262 10^3/UL (ref 130–400)
PMV BLD AUTO: 9.2 FL (ref 9–12.2)
POTASSIUM SERPL-SCNC: 3.7 MMOL/L (ref 3.6–5)
PROT SERPL-MCNC: 5.8 GM/DL (ref 6.4–8.2)
SODIUM SERPL-SCNC: 141 MMOL/L (ref 135–145)
WBC # BLD AUTO: 4.7 10^3/UL (ref 4.3–11)

## 2023-03-25 RX ADMIN — INSULIN ASPART SCH UNIT: 100 INJECTION, SOLUTION INTRAVENOUS; SUBCUTANEOUS at 11:27

## 2023-03-25 RX ADMIN — FAMOTIDINE SCH MG: 20 TABLET, FILM COATED ORAL at 21:04

## 2023-03-25 RX ADMIN — SUCRALFATE SCH GM: 1 TABLET ORAL at 11:23

## 2023-03-25 RX ADMIN — NYSTATIN SCH GM: 100000 CREAM TOPICAL at 13:37

## 2023-03-25 RX ADMIN — AMITRIPTYLINE HYDROCHLORIDE SCH MG: 25 TABLET, FILM COATED ORAL at 21:04

## 2023-03-25 RX ADMIN — SUCRALFATE SCH GM: 1 TABLET ORAL at 21:04

## 2023-03-25 RX ADMIN — POTASSIUM CHLORIDE SCH MEQ: 750 TABLET, FILM COATED, EXTENDED RELEASE ORAL at 17:13

## 2023-03-25 RX ADMIN — LEVOTHYROXINE SODIUM SCH MCG: 100 TABLET ORAL at 06:55

## 2023-03-25 RX ADMIN — PHENAZOPYRIDINE HYDROCHLORIDE SCH MG: 100 TABLET ORAL at 10:00

## 2023-03-25 RX ADMIN — INSULIN ASPART SCH UNIT: 100 INJECTION, SOLUTION INTRAVENOUS; SUBCUTANEOUS at 17:13

## 2023-03-25 RX ADMIN — PREGABALIN SCH MG: 75 CAPSULE ORAL at 21:04

## 2023-03-25 RX ADMIN — POTASSIUM CHLORIDE SCH MEQ: 750 TABLET, FILM COATED, EXTENDED RELEASE ORAL at 10:00

## 2023-03-25 RX ADMIN — BETHANECHOL CHLORIDE SCH MG: 25 TABLET ORAL at 21:04

## 2023-03-25 RX ADMIN — LIDOCAINE SCH EA: 50 PATCH CUTANEOUS at 10:00

## 2023-03-25 RX ADMIN — SUCRALFATE SCH GM: 1 TABLET ORAL at 17:13

## 2023-03-25 RX ADMIN — DOCUSATE SODIUM AND SENNOSIDES SCH EA: 8.6; 5 TABLET, FILM COATED ORAL at 10:00

## 2023-03-25 RX ADMIN — Medication SCH MCG: at 06:55

## 2023-03-25 RX ADMIN — Medication SCH EACH: at 10:00

## 2023-03-25 RX ADMIN — FOLIC ACID SCH MG: 1 TABLET ORAL at 10:00

## 2023-03-25 RX ADMIN — ENOXAPARIN SODIUM SCH MG: 100 INJECTION SUBCUTANEOUS at 17:12

## 2023-03-25 RX ADMIN — INSULIN ASPART SCH UNIT: 100 INJECTION, SOLUTION INTRAVENOUS; SUBCUTANEOUS at 06:55

## 2023-03-25 RX ADMIN — VANCOMYCIN HYDROCHLORIDE SCH MLS/HR: 500 INJECTION, POWDER, LYOPHILIZED, FOR SOLUTION INTRAVENOUS at 13:36

## 2023-03-25 RX ADMIN — BETHANECHOL CHLORIDE SCH MG: 25 TABLET ORAL at 11:31

## 2023-03-25 RX ADMIN — DOCUSATE SODIUM SCH MG: 100 CAPSULE ORAL at 21:16

## 2023-03-25 RX ADMIN — POLYETHYLENE GLYCOL (3350) SCH GM: 17 POWDER, FOR SOLUTION ORAL at 10:00

## 2023-03-25 RX ADMIN — FAMOTIDINE SCH MG: 20 TABLET, FILM COATED ORAL at 10:00

## 2023-03-25 RX ADMIN — BETHANECHOL CHLORIDE SCH MG: 25 TABLET ORAL at 17:12

## 2023-03-25 RX ADMIN — PREGABALIN SCH MG: 75 CAPSULE ORAL at 10:00

## 2023-03-25 RX ADMIN — NYSTATIN SCH GM: 100000 CREAM TOPICAL at 10:00

## 2023-03-25 RX ADMIN — MICONAZOLE NITRATE SCH APPLIC: 20 POWDER TOPICAL at 10:00

## 2023-03-25 RX ADMIN — POLYETHYLENE GLYCOL (3350) SCH GM: 17 POWDER, FOR SOLUTION ORAL at 21:16

## 2023-03-25 RX ADMIN — Medication SCH EACH: at 21:03

## 2023-03-25 RX ADMIN — NYSTATIN SCH GM: 100000 CREAM TOPICAL at 21:05

## 2023-03-25 RX ADMIN — PHENAZOPYRIDINE HYDROCHLORIDE SCH MG: 100 TABLET ORAL at 13:37

## 2023-03-25 RX ADMIN — MELATONIN 3 MG ORAL TABLET SCH MG: 3 TABLET ORAL at 21:03

## 2023-03-25 RX ADMIN — SUCRALFATE SCH GM: 1 TABLET ORAL at 06:55

## 2023-03-25 RX ADMIN — MICONAZOLE NITRATE SCH APPLIC: 20 POWDER TOPICAL at 21:05

## 2023-03-25 RX ADMIN — INSULIN ASPART SCH UNIT: 100 INJECTION, SOLUTION INTRAVENOUS; SUBCUTANEOUS at 21:04

## 2023-03-25 RX ADMIN — BETHANECHOL CHLORIDE SCH MG: 25 TABLET ORAL at 06:55

## 2023-03-25 RX ADMIN — DOCUSATE SODIUM AND SENNOSIDES SCH EA: 8.6; 5 TABLET, FILM COATED ORAL at 21:03

## 2023-03-25 RX ADMIN — PHENAZOPYRIDINE HYDROCHLORIDE SCH MG: 100 TABLET ORAL at 20:56

## 2023-03-25 RX ADMIN — DOCUSATE SODIUM SCH MG: 100 CAPSULE ORAL at 10:00

## 2023-03-25 NOTE — PM&R PROGRESS NOTE
Subjective


HPI/CC On Admission


Date Seen by Provider:  Mar 25, 2023


Time Seen by Provider:  12:00


Subjective/Events-last exam


3/25/2023:


Improved today


Adjusted robles catheter


Pain controlled


Eating well








3/24/2023:


Doing well


 at bedside


No neuro deficits last night


Pain comes and goes with chronic neuropathy


HLIVF








3/23/2023:


Improved status


No pain reported


Had an episode last evening of confusion and vision changes but vitals remained 

stable


Eating some but fluids minimal and UOP decreased so will initiate IVF gently








3/22/2023:


Much improved status 


Robles cath still in place


Urecholine maintained


Infection risk with in-dwelling cath


Very debilitated so unsure she could perform in/out caths at this time








3/21/2023:


No major events


Pain controlled


Dr Walker called me to update me on the MRI findings


No otseo noted under the wound








3/20/2023:


No major events


Improved transfers


Catheter removed but later could not void so replaced robles


Sugars monitored


Dr Walker consulted for wound








3/19/2023:


Much improved


Resting today


Family at bedside


No pain reported


Will DC catheter tomorrow








3/18/2023:


No major issues


DC tube feeding to prevent overfeeding


Dysarthria improved


Reviewed meds








3/17/2023:


Much improved


Participation is good


No falls


Pain is chronic neuropathy


Reinserted Robles catheter due to retention last night








3/16/2023:


Improved dramatically


Stood for 35 seconds with parallel bars


No pain reported except wound


Family at bedside








3/15/2023:


Improved overall


Family at bedside


Wound vac on hold due to bacteria in the wound culture


Dr Zamorano managing the IV abx 








3/14/2023:


Doing much better


Reviewed back history on her pre-existing debility:


10/5/22 gastric ulcer perforation but used wheelchair periodically prior to that

due to neuropathy


11/4/22 Decubitus ulcer admit stage III wound vac placed


1/29/23 used walker but wheelchair for long distances


Speech will see her today





Review of Systems


General:  Fatigue, Malaise





Objective


Exam


Vital Signs





Vital Signs








  Date Time  Temp Pulse Resp B/P (MAP) Pulse Ox O2 Delivery O2 Flow Rate FiO2


 


3/25/23 09:00     92 Room Air 0.00 


 


3/25/23 07:14 36.2 83 20 108/66 (80)    





Capillary Refill :


General Appearance:  No Apparent Distress, WD/WN, Anxious, Chronically ill


HEENT:  PERRL/EOMI, Normal ENT Inspection, Pharynx Normal


Neck:  Full Range of Motion, Normal Inspection, Non Tender, Supple, Carotid 

Bruit


Respiratory:  Chest Non Tender, Lungs Clear, Normal Breath Sounds, No Accessory 

Muscle Use, No Respiratory Distress


Cardiovascular:  Regular Rate, Rhythm, No Edema, No Gallop, No JVD, No Murmur, 

Normal Peripheral Pulses


Gastrointestinal:  Normal Bowel Sounds, No Organomegaly, No Pulsatile Mass, Non 

Tender, Soft


Back:  Normal Inspection, No CVA Tenderness, No Vertebral Tenderness


Extremity:  Normal Capillary Refill, Normal Inspection, Normal Range of Motion, 

Non Tender, No Calf Tenderness, No Pedal Edema


Neurologic/Psychiatric:  Alert, Oriented x3, CNs II-XII Norm as Tested, Abnormal

CNs II-XII, Depressed Affect, Facial Droop, Motor Weakness


Skin:  Normal Color, Warm/Dry


Lymphatic:  No Adenopathy





Results/Procedures


Lab


Laboratory Tests


3/25/23 05:55








Patient resulted labs reviewed.





FIM


Transfers


Therapy Code Descriptions/Definitions 





Functional [place] Measure:


0=Not Assessed/NA        4=Minimal Assistance


1=Total Assistance        5=Supervision or Setup


2=Maximal Assistance  6=Modified [place]


3=Moderate Assistance 7=Complete IndependenceSCALE: Activities may be completed 

with or without assistive devices.





6-Indepedent-patient completes the activity by him/herself with no assistance 

from a helper.


5-Set-up or Clean-up Assistance-helper sets up or cleans up; patient completes 

activity. [place] assists only prior to or  


    following the activity.


4-Supervision or Touching Assistance-helper provides verbal cues and/or 

touching/steadying and/or contact guard assistance as patient completes activi

ty. Assistance may be provided   


    throughout the activity or intermittently.


3-Partial/Moderate Assistance-helper does LESS THAN HALF the effort. [place] 

lifts, holds or supports trunk or limbs, but provides less than half the effort.


2-Substantial/Maximal Assistance-helper does MORE THAN HALF the effort. [place] 

lifts or holds trunk or limbs and provides more than half the effort.


7-Wlvviasad-hafuzd does ALL the effort. Patient does none of the effort to 

complete the activity. Or, the assistance of 2 or more helpers is required for 

the patient to complete the  


    activity.


If activity was not attempted, code reason:


7-Patient Refused.


9-Not Applicable-not attempted and the patient did not perform the activity 

before the current illness, exacerbation or injury.


10-Not Attempted due to Environmental Limitations-(lack of equipment, weather 

restraints, etc.).


88-Not Attempted due to Medical Conditions or Safety Concerns.


Roll Left to Right (QC):  2


Sit to Lying (QC):  1


Sit to Stand (QC):  1


Chair/Bed-to-Chair Xfer(QC):  1


Car Transfer (QC):  88





Gait Training


Does the Patient Walk?:  No and Walking Goal NOT indicated


Walk 10 feet (QC):  88


Walk 50 ft with 2 Turns(QC):  88


Walk 150 ft (QC):  88


Walking 10ft/uneven surface-QC:  88





Wheelchair Training


Does the Pt Use a Wheelchair?:  Yes


Wheel 50 ft with 2 turns (QC):  3


Wheel 150 ft (QC):  4


Type of Wheelchair:  Manual





Stair Training


1 Step (curb) (QC):  88


4 Steps (QC):  88


12 Steps (QC):  88





Balance


Picking up an Object (QC):  88





ADL-Treatment


Eating (QC):  6


Oral Hygiene (QC):  6


Shower/Bathe Self (QC):  3


Upper Body Dressing (QC):  3


Lower Body Dressing (QC):  1


On/Off Footwear (QC):  1


Toileting Hygiene (QC):  1


Toilet Transfer (QC):  1





Assessment/Plan


Assessment and Plan


Assess & Plan/Chief Complaint


Assessment:


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


Infective Endocarditis of Atrial Valve


Dysarthria/expressive aphasia


Dysphagia with PEG tube


Right Knee Effusion 


Sacral Decubitus Ulcer with MRSA so started Vanc


Anemia-iron def so ordered Venofer


T2DM - ID


Hypothyroidism


Constipation


Hyperlipidemia


GERD


Chronic debility: (10/5/22 gastric ulcer perforation but used wheelchair 

periodically prior to that due to neuropathy, 11/4/22 Decubitus ulcer admit 

stage III wound vac placed, 1/29/23 used walker but wheelchair for long 

distances)


Urinary retention attempted to DC catheter 3/16/23 and required replacement of 

cath 3/17/23





Plan:


Monitor closely


Change Synthroid to PO


ST consult for dysphagia


Utilize PEG


PT OT





3/14/2023:


Monitor closely


Speech therapy to advanced diet if able





3/15/2023:


Monitor closely


Wound care





3/16/2023:


Dramatically improved


Wound care


IV abx





3/17/2023:


Urecholine


Monitor closely





3/18/2023:


DC tube feeding





3/19/2023:


DC catheter tomorrow








3/20/2023:


Wound care


Robles cath replaced





3/21/2023:


Monitor pain


IV abx





3/22/2023:


Maintain cath


Monitor closely





3/23/2023:


IVF for dehydration





3/24/2023:


HLIVF





3/25/2023:


Adjusted catheter


Azo





(1) CVA (cerebral vascular accident)











COOKIE CHRISTIE DO                Mar 25, 2023 05:37

## 2023-03-25 NOTE — PHYSICAL THERAPY DAILY NOTE
PT Daily Note-Current


Subjective


Pt in bed upon arrival and agrees to PT. Says he leg has been hurting her since 

yesterday.





Pain


Section J - Health Conditions


1. Rarely or not at all


2. Occasionally


3. Frequently


4. Almost constantly


8. Unable to answer


Pain Effect on Sleep:  1


Pain Interference with Therapy:  2


Pain Interference w/Day-to-Day:  2





Mental Status


Patient Orientation:  Person, Place


Attachments:  Drains





Transfers


SCALE: Activities may be completed with or without assistive devices.





6-Indepedent-patient completes the activity by him/herself with no assistance 

from a helper.


5-Set-up or Clean-up Assistance-helper sets up or cleans up; patient completes 

activity. [place] assists only prior to or  


    following the activity.


4-Supervision or Touching Assistance-helper provides verbal cues and/or to

uching/steadying and/or contact guard assistance as patient completes activity. 

Assistance may be provided   


    throughout the activity or intermittently.


3-Partial/Moderate Assistance-helper does LESS THAN HALF the effort. [place] 

lifts, holds or supports trunk or limbs, but provides less than half the effort.


2-Substantial/Maximal Assistance-helper does MORE THAN HALF the effort. [place] 

lifts or holds trunk or limbs and provides more than half the effort.


3-Ubwhmeeaf-vnxlli does ALL the effort. Patient does none of the effort to 

complete the activity. Or, the assistance of 2 or more helpers is required for 

the patient to complete the  


    activity.


If activity was not attempted, code reason:


7-Patient Refused.


9-Not Applicable-not attempted and the patient did not perform the activity 

before the current illness, exacerbation or injury.


10-Not Attempted due to Environmental Limitations-(lack of equipment, weather 

restraints, etc.).


88-Not Attempted due to Medical Conditions or Safety Concerns.





Weight Bearing


Weight Bearing/Tolerated


Weight Bearing/Tolerated





Exercises


Supine Ex:  Ankle pumps, Quad Set, Rolling, Heel Slides, Short Arc Quads, 

Straight leg raise, Hip abd/add


Supine Reps:  30





Treatments


Nurse present as PT departs and all needs met.





Assessment


Current Status:  Good Progress


Pt LEs fatigued following bed exercises. Pt required verbal and tactile cues in 

order to perform exs correctly.





PT Long Term Goals


Long Term Goals


PT Long Term Goals Time Frame:  May 8, 2023


Roll Left & Right (QC):  4


Sit to Lying (QC):  4


Lying-Sitting on Side/Bed(QC):  4


Sit to Stand (QC):  2


Chair/Bed-to-Chair Xfer(QC):  2


Toilet Transfer (QC):  2


Car Transfer (QC):  2


Does the Patient Walk:  No and Walking Goal NOT indicated


Walk 10 feet (QC):  1


Walk 50ft with 2 Turns (QC):  88


Walk 150 ft (QC):  88


Walking 10ft on Uneven Surface:  88


1 Step (curb) (QC):  88


4 Steps (QC):  88


12 Steps (QC):  88


Picking up an Object (QC):  88


Does the Pt use WC or Scooter?:  Yes


Wheel 50 feet with 2 turns (QC:  5 (Patient able to use (L) LE and (B) LE's for 

w/c propulsion)


Type:  Manual


Wheel 150 feet:  5


Type:  Manual





PT Plan


Problem List


Problem List:  Activity Tolerance, Functional Strength





Treatment/Plan


Treatment Plan:  Continue Plan of Care


Treatment Plan:  Bed Mobility, Concurrent Therapy, Education, Functional 

Activity Russ, Functional Strength, Group Therapy, Safety, Therapeutic 

Exercise, Transfers, Other (W/C mobility)


Treatment Duration:  May 8, 2023


Frequency:  At least 5 of 7 days/Wk (IRF)


Estimated Hrs Per Day:  1.5 hours per day


Patient and/or Family Agrees t:  Yes





Safety Risks/Education


Patient Education:  Correct Positioning


Teaching Recipient:  Patient


Teaching Methods:  Discussion


Response to Teaching:  Return Demonstration





Time


Time In:  1035


Time Out:  1105


DATE:  Mar 25, 2023


Total Billed Treatment Time:  30


Total Billed Treatment


1,  EX x2











DARLINEMARIN PTA                Mar 25, 2023 14:11

## 2023-03-26 VITALS — DIASTOLIC BLOOD PRESSURE: 68 MMHG | SYSTOLIC BLOOD PRESSURE: 123 MMHG

## 2023-03-26 VITALS — SYSTOLIC BLOOD PRESSURE: 101 MMHG | DIASTOLIC BLOOD PRESSURE: 49 MMHG

## 2023-03-26 RX ADMIN — Medication SCH EACH: at 09:17

## 2023-03-26 RX ADMIN — Medication SCH EACH: at 20:51

## 2023-03-26 RX ADMIN — MICONAZOLE NITRATE SCH APPLIC: 20 POWDER TOPICAL at 20:53

## 2023-03-26 RX ADMIN — PREGABALIN SCH MG: 75 CAPSULE ORAL at 09:17

## 2023-03-26 RX ADMIN — POTASSIUM CHLORIDE SCH MEQ: 750 TABLET, FILM COATED, EXTENDED RELEASE ORAL at 09:17

## 2023-03-26 RX ADMIN — NYSTATIN SCH GM: 100000 CREAM TOPICAL at 20:53

## 2023-03-26 RX ADMIN — MELATONIN 3 MG ORAL TABLET SCH MG: 3 TABLET ORAL at 20:52

## 2023-03-26 RX ADMIN — BETHANECHOL CHLORIDE SCH MG: 25 TABLET ORAL at 17:36

## 2023-03-26 RX ADMIN — Medication SCH MCG: at 06:53

## 2023-03-26 RX ADMIN — DOCUSATE SODIUM SCH MG: 100 CAPSULE ORAL at 21:09

## 2023-03-26 RX ADMIN — SUCRALFATE SCH GM: 1 TABLET ORAL at 11:51

## 2023-03-26 RX ADMIN — PHENAZOPYRIDINE HYDROCHLORIDE SCH MG: 100 TABLET ORAL at 17:36

## 2023-03-26 RX ADMIN — POLYETHYLENE GLYCOL (3350) SCH GM: 17 POWDER, FOR SOLUTION ORAL at 21:09

## 2023-03-26 RX ADMIN — INSULIN ASPART SCH UNIT: 100 INJECTION, SOLUTION INTRAVENOUS; SUBCUTANEOUS at 05:39

## 2023-03-26 RX ADMIN — SUCRALFATE SCH GM: 1 TABLET ORAL at 20:51

## 2023-03-26 RX ADMIN — AMITRIPTYLINE HYDROCHLORIDE SCH MG: 25 TABLET, FILM COATED ORAL at 20:52

## 2023-03-26 RX ADMIN — ENOXAPARIN SODIUM SCH MG: 100 INJECTION SUBCUTANEOUS at 17:37

## 2023-03-26 RX ADMIN — BETHANECHOL CHLORIDE SCH MG: 25 TABLET ORAL at 11:51

## 2023-03-26 RX ADMIN — INSULIN ASPART SCH UNIT: 100 INJECTION, SOLUTION INTRAVENOUS; SUBCUTANEOUS at 16:21

## 2023-03-26 RX ADMIN — LIDOCAINE SCH EA: 50 PATCH CUTANEOUS at 09:17

## 2023-03-26 RX ADMIN — POLYETHYLENE GLYCOL (3350) SCH GM: 17 POWDER, FOR SOLUTION ORAL at 09:17

## 2023-03-26 RX ADMIN — FOLIC ACID SCH MG: 1 TABLET ORAL at 09:17

## 2023-03-26 RX ADMIN — NYSTATIN SCH GM: 100000 CREAM TOPICAL at 13:58

## 2023-03-26 RX ADMIN — FAMOTIDINE SCH MG: 20 TABLET, FILM COATED ORAL at 09:17

## 2023-03-26 RX ADMIN — INSULIN ASPART SCH UNIT: 100 INJECTION, SOLUTION INTRAVENOUS; SUBCUTANEOUS at 21:09

## 2023-03-26 RX ADMIN — VANCOMYCIN HYDROCHLORIDE SCH MLS/HR: 500 INJECTION, POWDER, LYOPHILIZED, FOR SOLUTION INTRAVENOUS at 13:57

## 2023-03-26 RX ADMIN — FAMOTIDINE SCH MG: 20 TABLET, FILM COATED ORAL at 20:52

## 2023-03-26 RX ADMIN — PHENAZOPYRIDINE HYDROCHLORIDE SCH MG: 100 TABLET ORAL at 13:57

## 2023-03-26 RX ADMIN — PREGABALIN SCH MG: 75 CAPSULE ORAL at 20:51

## 2023-03-26 RX ADMIN — MICONAZOLE NITRATE SCH APPLIC: 20 POWDER TOPICAL at 09:18

## 2023-03-26 RX ADMIN — NYSTATIN SCH GM: 100000 CREAM TOPICAL at 09:18

## 2023-03-26 RX ADMIN — DOCUSATE SODIUM AND SENNOSIDES SCH EA: 8.6; 5 TABLET, FILM COATED ORAL at 09:17

## 2023-03-26 RX ADMIN — SUCRALFATE SCH GM: 1 TABLET ORAL at 06:53

## 2023-03-26 RX ADMIN — POTASSIUM CHLORIDE SCH MEQ: 750 TABLET, FILM COATED, EXTENDED RELEASE ORAL at 17:36

## 2023-03-26 RX ADMIN — INSULIN ASPART SCH UNIT: 100 INJECTION, SOLUTION INTRAVENOUS; SUBCUTANEOUS at 11:52

## 2023-03-26 RX ADMIN — LEVOTHYROXINE SODIUM SCH MCG: 100 TABLET ORAL at 06:53

## 2023-03-26 RX ADMIN — DOCUSATE SODIUM AND SENNOSIDES SCH EA: 8.6; 5 TABLET, FILM COATED ORAL at 21:09

## 2023-03-26 RX ADMIN — BETHANECHOL CHLORIDE SCH MG: 25 TABLET ORAL at 20:52

## 2023-03-26 RX ADMIN — PHENAZOPYRIDINE HYDROCHLORIDE SCH MG: 100 TABLET ORAL at 09:16

## 2023-03-26 RX ADMIN — BETHANECHOL CHLORIDE SCH MG: 25 TABLET ORAL at 06:53

## 2023-03-26 RX ADMIN — DOCUSATE SODIUM SCH MG: 100 CAPSULE ORAL at 09:17

## 2023-03-26 RX ADMIN — SUCRALFATE SCH GM: 1 TABLET ORAL at 17:36

## 2023-03-26 NOTE — PM&R PROGRESS NOTE
Subjective


HPI/CC On Admission


Date Seen by Provider:  Mar 26, 2023


Time Seen by Provider:  08:30


Subjective/Events-last exam


3/26/2023:


No major issues


Changed catheter and now not leaking and not causing spasms


No other issues


Labs will be checked tomorrow








3/25/2023:


Improved today


Adjusted robles catheter


Pain controlled


Eating well








3/24/2023:


Doing well


 at bedside


No neuro deficits last night


Pain comes and goes with chronic neuropathy


HLIVF








3/23/2023:


Improved status


No pain reported


Had an episode last evening of confusion and vision changes but vitals remained 

stable


Eating some but fluids minimal and UOP decreased so will initiate IVF gently








3/22/2023:


Much improved status 


Robles cath still in place


Urecholine maintained


Infection risk with in-dwelling cath


Very debilitated so unsure she could perform in/out caths at this time








3/21/2023:


No major events


Pain controlled


Dr Walker called me to update me on the MRI findings


No otseo noted under the wound








3/20/2023:


No major events


Improved transfers


Catheter removed but later could not void so replaced robles


Sugars monitored


Dr Walker consulted for wound








3/19/2023:


Much improved


Resting today


Family at bedside


No pain reported


Will DC catheter tomorrow








3/18/2023:


No major issues


DC tube feeding to prevent overfeeding


Dysarthria improved


Reviewed meds








3/17/2023:


Much improved


Participation is good


No falls


Pain is chronic neuropathy


Reinserted Robles catheter due to retention last night








3/16/2023:


Improved dramatically


Stood for 35 seconds with parallel bars


No pain reported except wound


Family at bedside








3/15/2023:


Improved overall


Family at bedside


Wound vac on hold due to bacteria in the wound culture


Dr Zamorano managing the IV abx 








3/14/2023:


Doing much better


Reviewed back history on her pre-existing debility:


10/5/22 gastric ulcer perforation but used wheelchair periodically prior to that

due to neuropathy


11/4/22 Decubitus ulcer admit stage III wound vac placed


1/29/23 used walker but wheelchair for long distances


Speech will see her today





Review of Systems


General:  Fatigue, Malaise


Neurological:  Weakness





Objective


Exam


Vital Signs





Vital Signs








  Date Time  Temp Pulse Resp B/P (MAP) Pulse Ox O2 Delivery O2 Flow Rate FiO2


 


3/26/23 07:30 36.2 93 18 101/49 (66) 92 Room Air  


 


3/25/23 09:00       0.00 





Capillary Refill :


General Appearance:  No Apparent Distress, WD/WN, Anxious, Chronically ill


HEENT:  PERRL/EOMI, Normal ENT Inspection, Pharynx Normal


Neck:  Full Range of Motion, Normal Inspection, Non Tender, Supple, Carotid 

Bruit


Respiratory:  Chest Non Tender, Lungs Clear, Normal Breath Sounds, No Accessory 

Muscle Use, No Respiratory Distress


Cardiovascular:  Regular Rate, Rhythm, No Edema, No Gallop, No JVD, No Murmur, 

Normal Peripheral Pulses


Gastrointestinal:  Normal Bowel Sounds, No Organomegaly, No Pulsatile Mass, Non 

Tender, Soft


Back:  Normal Inspection, No CVA Tenderness, No Vertebral Tenderness


Extremity:  Normal Capillary Refill, Normal Inspection, Normal Range of Motion, 

Non Tender, No Calf Tenderness, No Pedal Edema


Neurologic/Psychiatric:  Alert, Oriented x3, CNs II-XII Norm as Tested, Abnormal

CNs II-XII, Depressed Affect, Facial Droop, Motor Weakness


Skin:  Normal Color, Warm/Dry


Lymphatic:  No Adenopathy





Results/Procedures


Lab


Patient resulted labs reviewed.





FIM


Transfers


Therapy Code Descriptions/Definitions 





Functional [place] Measure:


0=Not Assessed/NA        4=Minimal Assistance


1=Total Assistance        5=Supervision or Setup


2=Maximal Assistance  6=Modified [place]


3=Moderate Assistance 7=Complete IndependenceSCALE: Activities may be completed 

with or without assistive devices.





6-Indepedent-patient completes the activity by him/herself with no assistance 

from a helper.


5-Set-up or Clean-up Assistance-helper sets up or cleans up; patient completes 

activity. [place] assists only prior to or  


    following the activity.


4-Supervision or Touching Assistance-helper provides verbal cues and/or 

touching/steadying and/or contact guard assistance as patient completes 

activity. Assistance may be provided   


    throughout the activity or intermittently.


3-Partial/Moderate Assistance-helper does LESS THAN HALF the effort. [place] 

lifts, holds or supports trunk or limbs, but provides less than half the effort.


2-Substantial/Maximal Assistance-helper does MORE THAN HALF the effort. [place] 

lifts or holds trunk or limbs and provides more than half the effort.


3-[place]onku-waerar does ALL the effort. Patient does none of the effort to 

complete the activity. Or, the assistance of 2 or more helpers is required for 

the patient to complete the  


    activity.


If activity was not attempted, code reason:


7-Patient Refused.


9-Not Applicable-not attempted and the patient did not perform the activity 

before the current illness, exacerbation or injury.


10-Not Attempted due to Environmental Limitations-(lack of equipment, weather 

restraints, etc.).


88-Not Attempted due to Medical Conditions or Safety Concerns.


Roll Left to Right (QC):  2


Sit to Lying (QC):  1


Sit to Stand (QC):  1


Chair/Bed-to-Chair Xfer(QC):  1


Car Transfer (QC):  88





Gait Training


Does the Patient Walk?:  No and Walking Goal NOT indicated


Walk 10 feet (QC):  88


Walk 50 ft with 2 Turns(QC):  88


Walk 150 ft (QC):  88


Walking 10ft/uneven surface-QC:  88





Wheelchair Training


Does the Pt Use a Wheelchair?:  Yes


Wheel 50 ft with 2 turns (QC):  3


Wheel 150 ft (QC):  4


Type of Wheelchair:  Manual





Stair Training


1 Step (curb) (QC):  88


4 Steps (QC):  88


12 Steps (QC):  88





Balance


Picking up an Object (QC):  88





ADL-Treatment


Eating (QC):  6


Oral Hygiene (QC):  6


Shower/Bathe Self (QC):  3


Upper Body Dressing (QC):  3


Lower Body Dressing (QC):  1


On/Off Footwear (QC):  1


Toileting Hygiene (QC):  1


Toilet Transfer (QC):  1





Assessment/Plan


Assessment and Plan


Assess & Plan/Chief Complaint


Assessment:


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


Infective Endocarditis of Atrial Valve


Dysarthria/expressive aphasia


Dysphagia with PEG tube


Right Knee Effusion 


Sacral Decubitus Ulcer with MRSA so started Vanc


Anemia-iron def so ordered Venofer


T2DM - ID


Hypothyroidism


Constipation


Hyperlipidemia


GERD


Chronic debility: (10/5/22 gastric ulcer perforation but used wheelchair 

periodically prior to that due to neuropathy, 11/4/22 Decubitus ulcer admit 

stage III wound vac placed, 1/29/23 used walker but wheelchair for long 

distances)


Urinary retention attempted to DC catheter 3/16/23 and required replacement of 

cath 3/17/23





Plan:


Monitor closely


Change Synthroid to PO


ST consult for dysphagia


Utilize PEG


PT OT





3/14/2023:


Monitor closely


Speech therapy to advanced diet if able





3/15/2023:


Monitor closely


Wound care





3/16/2023:


Dramatically improved


Wound care


IV abx





3/17/2023:


Urecholine


Monitor closely





3/18/2023:


DC tube feeding





3/19/2023:


DC catheter tomorrow








3/20/2023:


Wound care


Robles cath replaced





3/21/2023:


Monitor pain


IV abx





3/22/2023:


Maintain cath


Monitor closely





3/23/2023:


IVF for dehydration





3/24/2023:


HLIVF





3/25/2023:


Adjusted catheter


Azo





3/26/2023:


Monitor closely


Fall risk





(1) CVA (cerebral vascular accident)











COOKIE CHRISTIE DO                Mar 26, 2023 05:42

## 2023-03-27 VITALS — DIASTOLIC BLOOD PRESSURE: 90 MMHG | SYSTOLIC BLOOD PRESSURE: 129 MMHG

## 2023-03-27 VITALS — DIASTOLIC BLOOD PRESSURE: 67 MMHG | SYSTOLIC BLOOD PRESSURE: 121 MMHG

## 2023-03-27 LAB
ALBUMIN SERPL-MCNC: 2.7 GM/DL (ref 3.2–4.5)
ALP SERPL-CCNC: 82 U/L (ref 40–136)
ALT SERPL-CCNC: 11 U/L (ref 0–55)
BASOPHILS # BLD AUTO: 0 10^3/UL (ref 0–0.1)
BASOPHILS NFR BLD AUTO: 1 % (ref 0–10)
BILIRUB SERPL-MCNC: 0.3 MG/DL (ref 0.1–1)
BUN/CREAT SERPL: 18
CALCIUM SERPL-MCNC: 8.8 MG/DL (ref 8.5–10.1)
CHLORIDE SERPL-SCNC: 109 MMOL/L (ref 98–107)
CO2 SERPL-SCNC: 22 MMOL/L (ref 21–32)
CREAT SERPL-MCNC: 0.55 MG/DL (ref 0.6–1.3)
EOSINOPHIL # BLD AUTO: 0.2 10^3/UL (ref 0–0.3)
EOSINOPHIL NFR BLD AUTO: 4 % (ref 0–10)
GFR SERPLBLD BASED ON 1.73 SQ M-ARVRAT: 106 ML/MIN
GLUCOSE SERPL-MCNC: 117 MG/DL (ref 70–105)
HCT VFR BLD CALC: 33 % (ref 35–52)
HGB BLD-MCNC: 9.8 G/DL (ref 11.5–16)
LYMPHOCYTES # BLD AUTO: 0.9 10^3/UL (ref 1–4)
LYMPHOCYTES NFR BLD AUTO: 21 % (ref 12–44)
MANUAL DIFFERENTIAL PERFORMED BLD QL: NO
MCH RBC QN AUTO: 24 PG (ref 25–34)
MCHC RBC AUTO-ENTMCNC: 30 G/DL (ref 32–36)
MCV RBC AUTO: 81 FL (ref 80–99)
MONOCYTES # BLD AUTO: 0.3 10^3/UL (ref 0–1)
MONOCYTES NFR BLD AUTO: 7 % (ref 0–12)
NEUTROPHILS # BLD AUTO: 2.9 10^3/UL (ref 1.8–7.8)
NEUTROPHILS NFR BLD AUTO: 67 % (ref 42–75)
PLATELET # BLD: 260 10^3/UL (ref 130–400)
PMV BLD AUTO: 8.9 FL (ref 9–12.2)
POTASSIUM SERPL-SCNC: 3.9 MMOL/L (ref 3.6–5)
PROT SERPL-MCNC: 6.1 GM/DL (ref 6.4–8.2)
SODIUM SERPL-SCNC: 141 MMOL/L (ref 135–145)
WBC # BLD AUTO: 4.3 10^3/UL (ref 4.3–11)

## 2023-03-27 RX ADMIN — FAMOTIDINE SCH MG: 20 TABLET, FILM COATED ORAL at 20:38

## 2023-03-27 RX ADMIN — DOCUSATE SODIUM SCH MG: 100 CAPSULE ORAL at 19:39

## 2023-03-27 RX ADMIN — SUCRALFATE SCH GM: 1 TABLET ORAL at 06:51

## 2023-03-27 RX ADMIN — MICONAZOLE NITRATE SCH APPLIC: 20 POWDER TOPICAL at 20:39

## 2023-03-27 RX ADMIN — FAMOTIDINE SCH MG: 20 TABLET, FILM COATED ORAL at 08:16

## 2023-03-27 RX ADMIN — INSULIN ASPART SCH UNIT: 100 INJECTION, SOLUTION INTRAVENOUS; SUBCUTANEOUS at 11:12

## 2023-03-27 RX ADMIN — SUCRALFATE SCH GM: 1 TABLET ORAL at 16:43

## 2023-03-27 RX ADMIN — VANCOMYCIN HYDROCHLORIDE SCH MLS/HR: 500 INJECTION, POWDER, LYOPHILIZED, FOR SOLUTION INTRAVENOUS at 13:22

## 2023-03-27 RX ADMIN — DOCUSATE SODIUM AND SENNOSIDES SCH EA: 8.6; 5 TABLET, FILM COATED ORAL at 19:39

## 2023-03-27 RX ADMIN — NYSTATIN SCH GM: 100000 CREAM TOPICAL at 20:39

## 2023-03-27 RX ADMIN — INSULIN ASPART SCH UNIT: 100 INJECTION, SOLUTION INTRAVENOUS; SUBCUTANEOUS at 20:38

## 2023-03-27 RX ADMIN — BETHANECHOL CHLORIDE SCH MG: 25 TABLET ORAL at 20:38

## 2023-03-27 RX ADMIN — AMITRIPTYLINE HYDROCHLORIDE SCH MG: 25 TABLET, FILM COATED ORAL at 20:38

## 2023-03-27 RX ADMIN — POLYETHYLENE GLYCOL (3350) SCH GM: 17 POWDER, FOR SOLUTION ORAL at 19:39

## 2023-03-27 RX ADMIN — Medication SCH EACH: at 20:38

## 2023-03-27 RX ADMIN — MELATONIN 3 MG ORAL TABLET SCH MG: 3 TABLET ORAL at 20:38

## 2023-03-27 RX ADMIN — LIDOCAINE SCH EA: 50 PATCH CUTANEOUS at 08:11

## 2023-03-27 RX ADMIN — DOCUSATE SODIUM SCH MG: 100 CAPSULE ORAL at 08:10

## 2023-03-27 RX ADMIN — ENOXAPARIN SODIUM SCH MG: 100 INJECTION SUBCUTANEOUS at 16:43

## 2023-03-27 RX ADMIN — PHENAZOPYRIDINE HYDROCHLORIDE SCH MG: 100 TABLET ORAL at 08:10

## 2023-03-27 RX ADMIN — BETHANECHOL CHLORIDE SCH MG: 25 TABLET ORAL at 11:17

## 2023-03-27 RX ADMIN — POTASSIUM CHLORIDE SCH MEQ: 750 TABLET, FILM COATED, EXTENDED RELEASE ORAL at 16:43

## 2023-03-27 RX ADMIN — NYSTATIN SCH GM: 100000 CREAM TOPICAL at 13:23

## 2023-03-27 RX ADMIN — BETHANECHOL CHLORIDE SCH MG: 25 TABLET ORAL at 16:43

## 2023-03-27 RX ADMIN — SUCRALFATE SCH GM: 1 TABLET ORAL at 11:17

## 2023-03-27 RX ADMIN — FOLIC ACID SCH MG: 1 TABLET ORAL at 08:10

## 2023-03-27 RX ADMIN — Medication SCH EACH: at 08:10

## 2023-03-27 RX ADMIN — POTASSIUM CHLORIDE SCH MEQ: 750 TABLET, FILM COATED, EXTENDED RELEASE ORAL at 08:10

## 2023-03-27 RX ADMIN — INSULIN ASPART SCH UNIT: 100 INJECTION, SOLUTION INTRAVENOUS; SUBCUTANEOUS at 16:26

## 2023-03-27 RX ADMIN — PREGABALIN SCH MG: 75 CAPSULE ORAL at 08:11

## 2023-03-27 RX ADMIN — PREGABALIN SCH MG: 75 CAPSULE ORAL at 20:37

## 2023-03-27 RX ADMIN — Medication SCH MCG: at 06:52

## 2023-03-27 RX ADMIN — DOCUSATE SODIUM AND SENNOSIDES SCH EA: 8.6; 5 TABLET, FILM COATED ORAL at 08:10

## 2023-03-27 RX ADMIN — SUCRALFATE SCH GM: 1 TABLET ORAL at 20:37

## 2023-03-27 RX ADMIN — MICONAZOLE NITRATE SCH APPLIC: 20 POWDER TOPICAL at 08:12

## 2023-03-27 RX ADMIN — INSULIN ASPART SCH UNIT: 100 INJECTION, SOLUTION INTRAVENOUS; SUBCUTANEOUS at 06:52

## 2023-03-27 RX ADMIN — NYSTATIN SCH GM: 100000 CREAM TOPICAL at 08:13

## 2023-03-27 RX ADMIN — LEVOTHYROXINE SODIUM SCH MCG: 100 TABLET ORAL at 06:52

## 2023-03-27 RX ADMIN — BETHANECHOL CHLORIDE SCH MG: 25 TABLET ORAL at 06:52

## 2023-03-27 RX ADMIN — POLYETHYLENE GLYCOL (3350) SCH GM: 17 POWDER, FOR SOLUTION ORAL at 08:11

## 2023-03-27 NOTE — PHYSICAL THERAPY DAILY NOTE
PT Daily Note-Current


Subjective


No new c/o. Patient in good spirits and willing to work with PT.  Patient's 

mother present for treatment.  Patient has just finished ST.





Pain


Section J - Health Conditions


1. Rarely or not at all


2. Occasionally


3. Frequently


4. Almost constantly


8. Unable to answer


Pain Effect on Sleep:  1


Pain Interference with Therapy:  2


Pain Interference w/Day-to-Day:  2





Transfers


SCALE: Activities may be completed with or without assistive devices.





6-Indepedent-patient completes the activity by him/herself with no assistance 

from a helper.


5-Set-up or Clean-up Assistance-helper sets up or cleans up; patient completes 

activity. [place] assists only prior to or  


    following the activity.


4-Supervision or Touching Assistance-helper provides verbal cues and/or 

touching/steadying and/or contact guard assistance as patient completes 

activity. Assistance may be provided   


    throughout the activity or intermittently.


3-Partial/Moderate Assistance-helper does LESS THAN HALF the effort. [place] 

lifts, holds or supports trunk or limbs, but provides less than half the effort.


2-Substantial/Maximal Assistance-helper does MORE THAN HALF the effort. [place] 

lifts or holds trunk or limbs and provides more than half the effort.


9-Nrlohgdkt-rozgyp does ALL the effort. Patient does none of the effort to 

complete the activity. Or, the assistance of 2 or more helpers is required for 

the patient to complete the  


    activity.


If activity was not attempted, code reason:


7-Patient Refused.


9-Not Applicable-not attempted and the patient did not perform the activity 

before the current illness, exacerbation or injury.


10-Not Attempted due to Environmental Limitations-(lack of equipment, weather 

restraints, etc.).


88-Not Attempted due to Medical Conditions or Safety Concerns.


Sit to Lying (QC):  3 (Mod (A) for LE's)


Lying to Sitting/Side of Bed(Q:  3 (Patient able to move both legs over to EOB, 

then required mod (A) of 1 to bring trunk to upright position.)


Sit to Stand (QC):  1 (Performed with standing lift.  Patient stood for 5 

minutes with shoes on feet while mother performed lisy hygiene (BM).  Patient 

became dizzy and was sat back to EOB with standing lift /p linens replaced and 

straightened.  )





Weight Bearing


Weight Bearing/Tolerated


Weight Bearing/Tolerated





Gait Training


Does the Patient Walk?:  No and Walking Goal NOT indicated





Exercises


Supine ex (B) LE's


AP (R) AROM x 20 with PF into resistance.


SAQ (R) x 15 with partial range each rep, improved since last week.


AAROM heel slides (R) x 15


AAROM hip abd/add (L) x 15


AP (L) x 20


Hip ER/IR with legs extended x 15 (B)


SAQ (L) x 20 


(L) heel slides with AAROM x 15


(L) hip abd/add x 15 (heel elevated to decrease shear)


(B) legs assisted to hookling position while patient attempted bridge x 5 reps. 





Sitting EOB:


Lateral weight shifts down to (R)/(L) elbow then coming to upight sitting -- 

patient had more difficulty coming to upright sitting from leaning on (L) elbow 

than (R).  No c/o (R) shoulder pain.


A-P weight shifts in sitting against resistance of therapist's hands x 15 for 

core strengthening


Trunk disassociation with PT hold patient's arms in cross arm pattern for 

scapular protraction/retraction on opposite side x 15 reps (B).


Cradled (R) arm with (L)- trunk rotation "rock the baby" x 10 (B) with patient 

performing without LOB.


Cradled (R) arm with (L) - trunk extension "lift the baby" x 10 with patient 

performing without LOB





Assessment


Tolerated sitting core and balance exercise well this date.  Improved LE 

strength noted (B) this date with less motor planning difficulties with 

exercise.  Did become dizzy at 5' magdalene in standing with lift -- resolved once 

sitting.  Will attempt standing frame with OT co-treatment as soon as wound vac 

is discharged.





PT Long Term Goals


Long Term Goals


PT Long Term Goals Time Frame:  May 8, 2023


Roll Left & Right (QC):  4


Sit to Lying (QC):  4


Lying-Sitting on Side/Bed(QC):  4


Sit to Stand (QC):  2


Chair/Bed-to-Chair Xfer(QC):  2


Toilet Transfer (QC):  2


Car Transfer (QC):  2


Does the Patient Walk:  No and Walking Goal NOT indicated


Walk 10 feet (QC):  1


Walk 50ft with 2 Turns (QC):  88


Walk 150 ft (QC):  88


Walking 10ft on Uneven Surface:  88


1 Step (curb) (QC):  88


4 Steps (QC):  88


12 Steps (QC):  88


Picking up an Object (QC):  88


Does the Pt use WC or Scooter?:  Yes


Wheel 50 feet with 2 turns (QC:  5 (Patient able to use (L) LE and (B) LE's for 

w/c propulsion)


Type:  Manual


Wheel 150 feet:  5


Type:  Manual





PT Plan


Problem List


Problem List:  Activity Tolerance, Functional Strength, Safety, Balance, 

Transfer, Bed Mobility





Treatment/Plan


Treatment Plan:  Continue Plan of Care


Treatment Plan:  Bed Mobility, Concurrent Therapy, Education, Functional Activit

y Russ, Functional Strength, Group Therapy, Safety, Therapeutic Exercise, 

Transfers, Other (W/C mobility)


Treatment Duration:  May 8, 2023


Frequency:  At least 5 of 7 days/Wk (IRF)


Estimated Hrs Per Day:  1.5 hours per day


Patient and/or Family Agrees t:  Yes





Time


Time In:  1005


Time Out:  1105


DATE:  Mar 27, 2023


Total Billed Treatment Time:  60


Total Billed Treatment


37 EX, 23 FA











Laurel Heredia PT               Mar 27, 2023 12:31

## 2023-03-27 NOTE — WOUND CARE ASSESSMENT
Wound Care Assessment


Date Seen by Provider:  Mar 27, 2023


Time Seen by Provider:  09:30


Chief Complaint


Stage 4 pressure ulcer with h/o osteomyelitis (acute)


HPI


This pleasant 57 year old patient was admitted to inpatient rehab with a very 

complex medical history. She has suffered a severe left sided CVA (embolic) with

history also for bacterial endocarditis with suspected source of sacral pressure

ulcer. She has had sacral debridement (including karly debridement) at [facility] in [place] in the past. She has had prolonged course of antibiotics as 

recommended by ID during that stay (6 weeks of IV Rocephen). Her wound healing 

will also be complicated by immobility, abnormal weight loss (50#), PEM, and 

anemia. She was receiving tube feeds but is now taking oral and PEG tube has 

been out for a week and a half with no complication. She has a h/o well 

controlled DM2 as well.  She is drinking glucerna shakes for increased protein 

and glucose control. During her wound care course, she has struggled with 

soiling of her wound bed (fecal contamination). She went through the weekend 

with no concerns of fecal contamination of wound vac. Today, upon changing vac 

there was some fecal contamination, but pt had a shower this morning, which 

likely caused this. Wound vac was reapplied with plan to do WTD if fecal 

contamination becomes an issue. She is originally from [place] and plans to return to

[place] once she is d/c'd from inpatient rehab. Pt would benefit from outpatient 

wound care upon d/c, this can be done closer to home if she desires.


Past Medical History:  Admits Diabetes Type II


Smoking Status:  Unknown if Ever Smoked


Recreational Drug Use:  No


Alcohol Use:  Denies Use


Review of Systems


Pulmonary:  No Dyspnea, No Cough


Cardiovascular:  No: Chest Pain, Palpitations


Gastrointestinal:  No: Nausea, Vomiting, Diarrhea, Constipation


Genitourinary:  Other ([place] catheter placed)


Musculoskeletal:  other (sacral pain)


Neurological:  Weakness





Exam





Vital Signs








  Date Time  Temp Pulse Resp B/P (MAP) Pulse Ox O2 Delivery O2 Flow Rate FiO2


 


3/27/23 09:00      Room Air  


 


3/27/23 08:00 36.1 92 16 129/90 (103) 93   


 


3/26/23 08:00       0.00 





Capillary Refill :


General Appearance:  WD/WN, no apparent distress


HEENT:  PERRL/EOMI


Neck:  full range of motion


Respiratory:  no respiratory distress, no accessory muscle use


Gastrointestinal:  soft


Extremities:  other (limited range of motion in bilateral lower extremities)


Neurologic/Psychiatric:  alert, oriented x 3, depressed affect


Skin:  other (gluteal cleft stage 4 ulceration epitheliazation is moderate, 

there is 1.2cm tunneling at 9 o clock, no undermining, granulation is large and 

pink, drainage is large and serosanguinous, necrosis is minimal)


Skin Problem Location:  other (gluteal cleft)


Skin Character:  erythema (surrounding gluteal cleft ulcer)





Results


Laboratory Tests


3/26/23 16:01: Glucometer 135H


3/26/23 20:33: Glucometer 135H


3/27/23 06:12: 


White Blood Count 4.3, Red Blood Count 4.06, Hemoglobin 9.8L, Hematocrit 33L, 

Mean Corpuscular Volume 81, Mean Corpuscular Hemoglobin 24L, Mean Corpuscular 

Hemoglobin Concent 30L, Red Cell Distribution Width 21.9H, Platelet Count 260, 

Mean Platelet Volume 8.9L, Immature Granulocyte % (Auto) 1, Neutrophils (%) (Aut

o) 67, Lymphocytes (%) (Auto) 21, Monocytes (%) (Auto) 7, Eosinophils (%) (Auto)

4, Basophils (%) (Auto) 1, Neutrophils # (Auto) 2.9, Lymphocytes # (Auto) 0.9L, 

Monocytes # (Auto) 0.3, Eosinophils # (Auto) 0.2, Basophils # (Auto) 0.0, 

Immature Granulocyte # (Auto) 0.0, Sodium Level 141, Potassium Level 3.9, 

Chloride Level 109H, Carbon Dioxide Level 22, Anion Gap 10, Blood Urea Nitrogen 

10, Creatinine 0.55L, Estimat Glomerular Filtration Rate 106, BUN/Creatinine 

Ratio 18, Glucose Level 117H, Calcium Level 8.8, Corrected Calcium 9.8, Total 

Bilirubin 0.3, Aspartate Amino Transf (AST/SGOT) 16, Alanine Aminotransferase 

(ALT/SGPT) 11, Alkaline Phosphatase 82, Total Protein 6.1L, Albumin 2.7L


3/27/23 11:08: Glucometer 128H





Microbiology


3/13/23 Gram Stain - Final, Complete


          


3/13/23 Wound Culture - Final, Complete


          Staphylococcus aureus


          Gram Pos Mixed Bacterial Teressa





Assessment/Plan/Dx


Assessment:





1. S4 Sacral pressure ulcer


2. H/o acute osteomyelitis of sacrum with exposed bone on exam. No evidence of 

osteo in area of current ulceration on recent MRI. 


3. Immobility syndrome secondary to recent acute embolic CVA


4. Recent bacterial endocarditis (due to sacral osteo)


5. Severe PEM with recent 50# weight loss due to dysphagia


6. Anemia





Plan: 





1. Continue wound vac: WF to base wiht granulogoam atop at 125. Change twice 

weekly. If fecal contamination becomes an issue, switch to WTD bid. She may be 

able to transition to more conservative dressing upon d/c home as she has made 

good progress iwth wound vac during her stay with us.


2. Abnormalities in areas not contiguous with current ulceration. She would 

benefit from outpatient ID consultation upon d/c home to see if this is 

infectious vs. inflammatory in nature. (tawnya. with complex infection history)


3. Defer to primary team


4. Defer to primary team


5. Vashe WTD bid dressings to former PEG site. Glucerna shakes tid po.


6. Defer to primary team





Supervisory-Addendum Brief


Verification & Attestation


Participated in pt care:  history, MDM


Personally performed:  history, MDM, supervision of care


Care discussed with:  Medical Student


Procedures:  n/a


Results interpretation:  Verified all documentation


SEB Keenan MD MED STUDENT    Mar 27, 2023 14:26


BROCK WANG MD            Mar 27, 2023 15:02

## 2023-03-27 NOTE — OCCUPATIONAL THER DAILY NOTE
OT Current Status-Daily Note


Subjective


Pt alert, lying in bed.  No c/o pain.  Pt agrees to therapy.





Mental Status/Objective


Patient Orientation:  Person, Place, Time, Situation


Attachments:  Drains, Clayton Catheter, IV





ADL-Treatment


Pt agrees to shower.  Pt is able to open containers/packages with increased time

and uses regular utensils to eat.  Mod A for supine <--> EOB, sitting EOB 

independent.  Sit to stand lift for all transfers.  Pt sat 100% of the time to 

complete shower on rolling shower chair using grabbars and hand held shower to 

bathe all areas except buttocks. Pt is now using LH sponge to complete lower 

legs and feet. Set up by positioning clothing to thread R UE then pt completed 

rest of UBD sequence.  Dependent for lower body and footwear.  Independent 

sitting at sink for oral care.  Dependent for toileting.  After session, pt 

lying in bed eating breakfast.  Call light/phone in reach.  All needs met in 

room.


Therapy Code Descriptions/Definitions 





Functional [place] Measure:


0=Not Assessed/NA        4=Minimal Assistance


1=Total Assistance        5=Supervision or Setup


2=Maximal Assistance  6=Modified [place]


3=Moderate Assistance 7=Complete IndependenceSCALE: Activities may be completed 

with or without assistive devices.





6-Indepedent-patient completes the activity by him/herself with no assistance f

rom a helper.


5-Set-up or Clean-up Assistance-helper sets up or cleans up; patient completes 

activity. [place] assists only prior to or  


    following the activity.


4-Supervision or Touching Assistance-helper provides verbal cues and/or 

touching/steadying and/or contact guard assistance as patient completes 

activity. Assistance may be provided   


    throughout the activity or intermittently.


3-Partial/Moderate Assistance-helper does LESS THAN HALF the effort. [place] 

lifts, holds or supports trunk or limbs, but provides less than half the effort.


2-Substantial/Maximal Assistance-helper does MORE THAN HALF the effort. [place] 

lifts or holds trunk or limbs and provides more than half the effort.


8-Cwzbaflpt-uxnkwu does ALL the effort. Patient does none of the effort to 

complete the activity. Or, the assistance of 2 or more helpers is required for 

the patient to complete the  


    activity.


If activity was not attempted, code reason:


7-Patient Refused.


9-Not Applicable-not attempted and the patient did not perform the activity 

before the current illness, exacerbation or injury.


10-Not Attempted due to Environmental Limitations-(lack of equipment, weather 

restraints, etc.).


88-Not Attempted due to Medical Conditions or Safety Concerns.


Eating (QC):  6


Oral Hygiene (QC):  6


Shower/Bathe Self (QC):  3


Upper Body Dressing (QC):  5


Lower Body Dressing (QC):  1


On/Off Footwear:  1


Toileting Hygiene (QC):  1


Toilet Transfer (QC):  1





OT Short Term Goals


Short Term Goals


Time Frame:  Mar 28, 2023


Eating:  3


Toileting hygiene:  3


Shower/bathe self:  3


Lower body dressing:  3


Putting on/taking off footwear:  3





OT Long Term Goals


Long Term Goals


Time Frame:  2023


Acute change in mental status:  1


Inattention:  0


Disorganized thinkin


Altered level of consciousness:  0


Eating (QC):  5


Oral Hygiene (QC):  5


Toileting Hygiene (QC):  4


Shower/Bathe Self (QC):  4


Upper Body Dressing (QC):  5


Lower Body Dressing (QC):  4


On/Off Footwear (QC):  4


Additional Goals:  1-Demonstrate ADL Tasks, 2-Verbalize Understanding, 3-

ImproveStrength/Russ


1=Demonstrate adherence to instructed precautions during ADL tasks.


2=Patient will verbalize/demonstrate understanding of assistive hattie

francisco/modifications for ADL.


3=Patient will improve strength/tolerance for activity to enable patient to 

perform ADL's.





OT Education/Plan


Problem List/Assessment


Assessment:  Decreased Activ Tolerance, Decreased UE Strength, Dependent 

Transfers, Impaired Bed Mobility, Impaired Coordination, Impaired Self-Care 

Skills, Restricted Funct UE ROM





Discharge Recommendations


Plan/Recommendations:  Continue POC





Treatment Plan/Plan of Care


Patient would benefit from OT for education, treatment and training to promote 

independence in ADL's, mobility, safety and/or upper extremity function for 

ADL's.


Plan of Care:  ADL Retraining, Functional Mobility, Group Exercise/Act as Ind, 

UE Funct Exercise/Act, UE Neuromus Re-Ed/Coord, Visual/Perceptual Retrain, W/C 

Management Training


Treatment Duration:  2023


Frequency:  At least 5 of 7 days/Wk (IRF)


Estimated Hrs Per Day:  1.5 hours per day


Agreement:  Yes


Rehab Potential:  Guarded





Time


Start Time:  06:45


Stop Time:  08:00


DATE:  Mar 27, 2023


Total Time Billed (hr/min):  75


Billed Treatment Time


1 visit-ADL 5 (75 min)











ARTIE MC               Mar 27, 2023 07:44

## 2023-03-27 NOTE — PHYSICAL THERAPY DAILY NOTE
PT Daily Note-Current


Subjective


Patient with no new c/o.  Agreeable to work with PT.  She states she is 

concerned about her swollen (R) hand more than anything this p.m.





Pain


Section J - Health Conditions


1. Rarely or not at all


2. Occasionally


3. Frequently


4. Almost constantly


8. Unable to answer


Pain Effect on Sleep:  1


Pain Interference with Therapy:  2


Pain Interference w/Day-to-Day:  2





Transfers


SCALE: Activities may be completed with or without assistive devices.





6-Indepedent-patient completes the activity by him/herself with no assistance 

from a helper.


5-Set-up or Clean-up Assistance-helper sets up or cleans up; patient completes 

activity. [place] assists only prior to or  


    following the activity.


4-Supervision or Touching Assistance-helper provides verbal cues and/or 

touching/steadying and/or contact guard assistance as patient completes activity

. Assistance may be provided   


    throughout the activity or intermittently.


3-Partial/Moderate Assistance-helper does LESS THAN HALF the effort. [place] 

lifts, holds or supports trunk or limbs, but provides less than half the effort.


2-Substantial/Maximal Assistance-helper does MORE THAN HALF the effort. [place] 

lifts or holds trunk or limbs and provides more than half the effort.


4-Ughhmycxk-gwteej does ALL the effort. Patient does none of the effort to 

complete the activity. Or, the assistance of 2 or more helpers is required for 

the patient to complete the  


    activity.


If activity was not attempted, code reason:


7-Patient Refused.


9-Not Applicable-not attempted and the patient did not perform the activity 

before the current illness, exacerbation or injury.


10-Not Attempted due to Environmental Limitations-(lack of equipment, weather 

restraints, etc.).


88-Not Attempted due to Medical Conditions or Safety Concerns.





Weight Bearing


Weight Bearing/Tolerated


Weight Bearing/Tolerated





Treatments


MLD to (R) UE starting in axillary LN, and antecubital LN, progressing down arm 

to hand, then retrograde from fingertips to axilla on (R).


Patient then able to demonstrate opposition of thumb to each finger, which she 

was not able to do prior to MLD.


MLD and hand exercise to improve  (R) hand in preparation for upright 

mobility activities in // bars or in standing frame tomorrow.





PT Long Term Goals


Long Term Goals


PT Long Term Goals Time Frame:  May 8, 2023


Roll Left & Right (QC):  4


Sit to Lying (QC):  4


Lying-Sitting on Side/Bed(QC):  4


Sit to Stand (QC):  2


Chair/Bed-to-Chair Xfer(QC):  2


Toilet Transfer (QC):  2


Car Transfer (QC):  2


Does the Patient Walk:  No and Walking Goal NOT indicated


Walk 10 feet (QC):  1


Walk 50ft with 2 Turns (QC):  88


Walk 150 ft (QC):  88


Walking 10ft on Uneven Surface:  88


1 Step (curb) (QC):  88


4 Steps (QC):  88


12 Steps (QC):  88


Picking up an Object (QC):  88


Does the Pt use WC or Scooter?:  Yes


Wheel 50 feet with 2 turns (QC:  5 (Patient able to use (L) LE and (B) LE's for 

w/c propulsion)


Type:  Manual


Wheel 150 feet:  5


Type:  Manual





PT Plan


Treatment/Plan


Treatment Plan:  Continue Plan of Care, Modify Plan, see comments


Treatment Plan:  Bed Mobility, Concurrent Therapy, Education, Functional 

Activity Russ, Functional Strength, Group Therapy, Safety, Therapeutic 

Exercise, Transfers, Other (W/C mobility)


Manual therapy added to POC to combat (R) UE edema and facility (R)  with 

upright activities with stander/lift, standing activities in // bars


Treatment Duration:  May 8, 2023


Frequency:  At least 5 of 7 days/Wk (IRF)


Estimated Hrs Per Day:  1.5 hours per day


Patient and/or Family Agrees t:  Yes





Time


Time In:  1357


Time Out:  1413


DATE:  Mar 27, 2023


Total Billed Treatment Time:  16


Total Billed Treatment


16' Ex











Laurel Heredia PT               Mar 27, 2023 15:33

## 2023-03-27 NOTE — PM&R PROGRESS NOTE
Subjective


HPI/CC On Admission


Date Seen by Provider:  Mar 27, 2023


Time Seen by Provider:  09:30


Subjective/Events-last exam


3/27/2023:


Much improved


Overall having no new issues


Robles catheter is working very well








3/26/2023:


No major issues


Changed catheter and now not leaking and not causing spasms


No other issues


Labs will be checked tomorrow








3/25/2023:


Improved today


Adjusted robles catheter


Pain controlled


Eating well








3/24/2023:


Doing well


 at bedside


No neuro deficits last night


Pain comes and goes with chronic neuropathy


HLIVF








3/23/2023:


Improved status


No pain reported


Had an episode last evening of confusion and vision changes but vitals remained 

stable


Eating some but fluids minimal and UOP decreased so will initiate IVF gently








3/22/2023:


Much improved status 


Robles cath still in place


Urecholine maintained


Infection risk with in-dwelling cath


Very debilitated so unsure she could perform in/out caths at this time








3/21/2023:


No major events


Pain controlled


Dr Walker called me to update me on the MRI findings


No otseo noted under the wound








3/20/2023:


No major events


Improved transfers


Catheter removed but later could not void so replaced robles


Sugars monitored


Dr Walker consulted for wound








3/19/2023:


Much improved


Resting today


Family at bedside


No pain reported


Will DC catheter tomorrow








3/18/2023:


No major issues


DC tube feeding to prevent overfeeding


Dysarthria improved


Reviewed meds








3/17/2023:


Much improved


Participation is good


No falls


Pain is chronic neuropathy


Reinserted Robles catheter due to retention last night








3/16/2023:


Improved dramatically


Stood for 35 seconds with parallel bars


No pain reported except wound


Family at bedside








3/15/2023:


Improved overall


Family at bedside


Wound vac on hold due to bacteria in the wound culture


Dr Zamorano managing the IV abx 








3/14/2023:


Doing much better


Reviewed back history on her pre-existing debility:


10/5/22 gastric ulcer perforation but used wheelchair periodically prior to that

due to neuropathy


11/4/22 Decubitus ulcer admit stage III wound vac placed


1/29/23 used walker but wheelchair for long distances


Speech will see her today





Review of Systems


General:  Fatigue, Malaise





Objective


Exam


Vital Signs





Vital Signs








  Date Time  Temp Pulse Resp B/P (MAP) Pulse Ox O2 Delivery O2 Flow Rate FiO2


 


3/27/23 20:50 37.1 93 18 121/67 (85) 91 Room Air  


 


3/26/23 08:00       0.00 





Capillary Refill :


General Appearance:  No Apparent Distress, WD/WN, Anxious, Chronically ill


HEENT:  PERRL/EOMI, Normal ENT Inspection, Pharynx Normal


Neck:  Full Range of Motion, Normal Inspection, Non Tender, Supple, Carotid 

Bruit


Respiratory:  Chest Non Tender, Lungs Clear, Normal Breath Sounds, No Accessory 

Muscle Use, No Respiratory Distress


Cardiovascular:  Regular Rate, Rhythm, No Edema, No Gallop, No JVD, No Murmur, 

Normal Peripheral Pulses


Gastrointestinal:  Normal Bowel Sounds, No Organomegaly, No Pulsatile Mass, Non 

Tender, Soft


Back:  Normal Inspection, No CVA Tenderness, No Vertebral Tenderness


Extremity:  Normal Capillary Refill, Normal Inspection, Normal Range of Motion, 

Non Tender, No Calf Tenderness, No Pedal Edema


Neurologic/Psychiatric:  Alert, Oriented x3, CNs II-XII Norm as Tested, Abnormal

CNs II-XII, Depressed Affect, Facial Droop, Motor Weakness


Skin:  Normal Color, Warm/Dry


Lymphatic:  No Adenopathy





Results/Procedures


Lab


Laboratory Tests


3/27/23 06:12








Patient resulted labs reviewed.





FIM


Transfers


Therapy Code Descriptions/Definitions 





Functional [place] Measure:


0=Not Assessed/NA        4=Minimal Assistance


1=Total Assistance        5=Supervision or Setup


2=Maximal Assistance  6=Modified [place]


3=Moderate Assistance 7=Complete IndependenceSCALE: Activities may be completed 

with or without assistive devices.





6-Indepedent-patient completes the activity by him/herself with no assistance 

from a helper.


5-Set-up or Clean-up Assistance-helper sets up or cleans up; patient completes 

activity. [place] assists only prior to or  


    following the activity.


4-Supervision or Touching Assistance-helper provides verbal cues and/or 

touching/steadying and/or contact guard assistance as patient completes 

activity. Assistance may be provided   


    throughout the activity or intermittently.


3-Partial/Moderate Assistance-helper does LESS THAN HALF the effort. [place] 

lifts, holds or supports trunk or limbs, but provides less than half the effort.


2-Substantial/Maximal Assistance-helper does MORE THAN HALF the effort. [place] 

lifts or holds trunk or limbs and provides more than half the effort.


0-Xzlpupwri-vchjeu does ALL the effort. Patient does none of the effort to 

complete the activity. Or, the assistance of 2 or more helpers is required for 

the patient to complete the  


    activity.


If activity was not attempted, code reason:


7-Patient Refused.


9-Not Applicable-not attempted and the patient did not perform the activity 

before the current illness, exacerbation or injury.


10-Not Attempted due to Environmental Limitations-(lack of equipment, weather 

restraints, etc.).


88-Not Attempted due to Medical Conditions or Safety Concerns.


Roll Left to Right (QC):  2


Sit to Lying (QC):  1


Sit to Stand (QC):  1


Chair/Bed-to-Chair Xfer(QC):  1


Car Transfer (QC):  88





Gait Training


Does the Patient Walk?:  No and Walking Goal NOT indicated


Walk 10 feet (QC):  88


Walk 50 ft with 2 Turns(QC):  88


Walk 150 ft (QC):  88


Walking 10ft/uneven surface-QC:  88





Wheelchair Training


Does the Pt Use a Wheelchair?:  Yes


Wheel 50 ft with 2 turns (QC):  3


Wheel 150 ft (QC):  4


Type of Wheelchair:  Manual





Stair Training


1 Step (curb) (QC):  88


4 Steps (QC):  88


12 Steps (QC):  88





Balance


Picking up an Object (QC):  88





ADL-Treatment


Eating (QC):  6


Oral Hygiene (QC):  6


Shower/Bathe Self (QC):  3


Upper Body Dressing (QC):  3


Lower Body Dressing (QC):  1


On/Off Footwear (QC):  1


Toileting Hygiene (QC):  1


Toilet Transfer (QC):  1





Assessment/Plan


Assessment and Plan


Assess & Plan/Chief Complaint


Assessment:


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


Infective Endocarditis of Atrial Valve


Dysarthria/expressive aphasia


Dysphagia with PEG tube


Right Knee Effusion 


Sacral Decubitus Ulcer with MRSA so started Vanc


Anemia-iron def so ordered Venofer


T2DM - ID


Hypothyroidism


Constipation


Hyperlipidemia


GERD


Chronic debility: (10/5/22 gastric ulcer perforation but used wheelchair 

periodically prior to that due to neuropathy, 11/4/22 Decubitus ulcer admit 

stage III wound vac placed, 1/29/23 used walker but wheelchair for long 

distances)


Urinary retention attempted to DC catheter 3/16/23 and required replacement of 

cath 3/17/23





Plan:


Monitor closely


Change Synthroid to PO


ST consult for dysphagia


Utilize PEG


PT OT





3/14/2023:


Monitor closely


Speech therapy to advanced diet if able





3/15/2023:


Monitor closely


Wound care





3/16/2023:


Dramatically improved


Wound care


IV abx





3/17/2023:


Urecholine


Monitor closely





3/18/2023:


DC tube feeding





3/19/2023:


DC catheter tomorrow








3/20/2023:


Wound care


Robles cath replaced





3/21/2023:


Monitor pain


IV abx





3/22/2023:


Maintain cath


Monitor closely





3/23/2023:


IVF for dehydration





3/24/2023:


HLIVF





3/25/2023:


Adjusted catheter


Azo





3/26/2023:


Monitor closely


Fall risk





3/27/2023:


Change Pyridium to prn





(1) CVA (cerebral vascular accident)











COOKIE CHRISTIE DO                Mar 27, 2023 05:25

## 2023-03-28 VITALS — SYSTOLIC BLOOD PRESSURE: 118 MMHG | DIASTOLIC BLOOD PRESSURE: 67 MMHG

## 2023-03-28 VITALS — SYSTOLIC BLOOD PRESSURE: 126 MMHG | DIASTOLIC BLOOD PRESSURE: 72 MMHG

## 2023-03-28 RX ADMIN — DOCUSATE SODIUM SCH MG: 100 CAPSULE ORAL at 20:30

## 2023-03-28 RX ADMIN — FAMOTIDINE SCH MG: 20 TABLET, FILM COATED ORAL at 20:34

## 2023-03-28 RX ADMIN — DOCUSATE SODIUM SCH MG: 100 CAPSULE ORAL at 09:12

## 2023-03-28 RX ADMIN — NYSTATIN SCH GM: 100000 CREAM TOPICAL at 13:25

## 2023-03-28 RX ADMIN — ENOXAPARIN SODIUM SCH MG: 100 INJECTION SUBCUTANEOUS at 17:32

## 2023-03-28 RX ADMIN — MICONAZOLE NITRATE SCH APPLIC: 20 POWDER TOPICAL at 10:14

## 2023-03-28 RX ADMIN — NYSTATIN SCH GM: 100000 CREAM TOPICAL at 10:14

## 2023-03-28 RX ADMIN — LIDOCAINE SCH EA: 50 PATCH CUTANEOUS at 09:10

## 2023-03-28 RX ADMIN — SUCRALFATE SCH GM: 1 TABLET ORAL at 20:34

## 2023-03-28 RX ADMIN — DOCUSATE SODIUM AND SENNOSIDES SCH EA: 8.6; 5 TABLET, FILM COATED ORAL at 20:30

## 2023-03-28 RX ADMIN — POTASSIUM CHLORIDE SCH MEQ: 750 TABLET, FILM COATED, EXTENDED RELEASE ORAL at 10:09

## 2023-03-28 RX ADMIN — LEVOTHYROXINE SODIUM SCH MCG: 100 TABLET ORAL at 06:26

## 2023-03-28 RX ADMIN — MELATONIN 3 MG ORAL TABLET SCH MG: 3 TABLET ORAL at 20:34

## 2023-03-28 RX ADMIN — SUCRALFATE SCH GM: 1 TABLET ORAL at 11:25

## 2023-03-28 RX ADMIN — FAMOTIDINE SCH MG: 20 TABLET, FILM COATED ORAL at 10:09

## 2023-03-28 RX ADMIN — AMITRIPTYLINE HYDROCHLORIDE SCH MG: 25 TABLET, FILM COATED ORAL at 20:34

## 2023-03-28 RX ADMIN — INSULIN ASPART SCH UNIT: 100 INJECTION, SOLUTION INTRAVENOUS; SUBCUTANEOUS at 11:09

## 2023-03-28 RX ADMIN — SUCRALFATE SCH GM: 1 TABLET ORAL at 06:26

## 2023-03-28 RX ADMIN — BETHANECHOL CHLORIDE SCH MG: 25 TABLET ORAL at 11:25

## 2023-03-28 RX ADMIN — Medication SCH EACH: at 20:34

## 2023-03-28 RX ADMIN — NYSTATIN SCH GM: 100000 CREAM TOPICAL at 20:30

## 2023-03-28 RX ADMIN — DOCUSATE SODIUM AND SENNOSIDES SCH EA: 8.6; 5 TABLET, FILM COATED ORAL at 09:12

## 2023-03-28 RX ADMIN — Medication SCH MCG: at 06:26

## 2023-03-28 RX ADMIN — BETHANECHOL CHLORIDE SCH MG: 25 TABLET ORAL at 06:26

## 2023-03-28 RX ADMIN — MICONAZOLE NITRATE SCH APPLIC: 20 POWDER TOPICAL at 20:35

## 2023-03-28 RX ADMIN — PREGABALIN SCH MG: 75 CAPSULE ORAL at 20:34

## 2023-03-28 RX ADMIN — Medication SCH EACH: at 10:08

## 2023-03-28 RX ADMIN — POLYETHYLENE GLYCOL (3350) SCH GM: 17 POWDER, FOR SOLUTION ORAL at 09:12

## 2023-03-28 RX ADMIN — INSULIN ASPART SCH UNIT: 100 INJECTION, SOLUTION INTRAVENOUS; SUBCUTANEOUS at 17:30

## 2023-03-28 RX ADMIN — PREGABALIN SCH MG: 75 CAPSULE ORAL at 10:09

## 2023-03-28 RX ADMIN — POTASSIUM CHLORIDE SCH MEQ: 750 TABLET, FILM COATED, EXTENDED RELEASE ORAL at 17:29

## 2023-03-28 RX ADMIN — FOLIC ACID SCH MG: 1 TABLET ORAL at 10:09

## 2023-03-28 RX ADMIN — BETHANECHOL CHLORIDE SCH MG: 25 TABLET ORAL at 20:34

## 2023-03-28 RX ADMIN — INSULIN ASPART SCH UNIT: 100 INJECTION, SOLUTION INTRAVENOUS; SUBCUTANEOUS at 20:33

## 2023-03-28 RX ADMIN — BETHANECHOL CHLORIDE SCH MG: 25 TABLET ORAL at 17:29

## 2023-03-28 RX ADMIN — INSULIN ASPART SCH UNIT: 100 INJECTION, SOLUTION INTRAVENOUS; SUBCUTANEOUS at 06:26

## 2023-03-28 RX ADMIN — Medication SCH ML: at 13:26

## 2023-03-28 RX ADMIN — VANCOMYCIN HYDROCHLORIDE SCH MLS/HR: 500 INJECTION, POWDER, LYOPHILIZED, FOR SOLUTION INTRAVENOUS at 13:25

## 2023-03-28 RX ADMIN — POLYETHYLENE GLYCOL (3350) SCH GM: 17 POWDER, FOR SOLUTION ORAL at 20:30

## 2023-03-28 RX ADMIN — SUCRALFATE SCH GM: 1 TABLET ORAL at 17:30

## 2023-03-28 RX ADMIN — Medication SCH ML: at 20:35

## 2023-03-28 NOTE — OCCUPATIONAL THER DAILY NOTE
OT Current Status-Daily Note


Subjective


Pt alert, sitting in w/c finishing up with SLP.  Pt agrees to therapy. Pt is 

tearful about what she is struggling to do.  PT/OT co-treat ([id number]), skills 

of 2 clinicians required to decrease fall risk, increase motor coordination b/t 

UE/LE during mobility tasks.  PT focusing on transfers, B LE's and standing 

while OT focusing on functional movement of B UE's during tasks.





Mental Status/Objective


Patient Orientation:  Person, Place, Time, Situation


Attachments:  Drains, Clayton Catheter, IV





ADL-Treatment


Therapy Code Descriptions/Definitions 





Functional [place] Measure:


0=Not Assessed/NA        4=Minimal Assistance


1=Total Assistance        5=Supervision or Setup


2=Maximal Assistance  6=Modified [place]


3=Moderate Assistance 7=Complete IndependenceSCALE: Activities may be completed 

with or without assistive devices.





6-Indepedent-patient completes the activity by him/herself with no assistance 

from a helper.


5-Set-up or Clean-up Assistance-helper sets up or cleans up; patient completes 

activity. [place] assists only prior to or  


    following the activity.


4-Supervision or Touching Assistance-helper provides verbal cues and/or 

touching/steadying and/or contact guard assistance as patient completes 

activity. Assistance may be provided   


    throughout the activity or intermittently.


3-Partial/Moderate Assistance-helper does LESS THAN HALF the effort. [place] 

lifts, holds or supports trunk or limbs, but provides less than half the effort.


2-Substantial/Maximal Assistance-helper does MORE THAN HALF the effort. [place] 

lifts or holds trunk or limbs and provides more than half the effort.


9-Teukdjbyp-iisymz does ALL the effort. Patient does none of the effort to com

plete the activity. Or, the assistance of 2 or more helpers is required for the 

patient to complete the  


    activity.


If activity was not attempted, code reason:


7-Patient Refused.


9-Not Applicable-not attempted and the patient did not perform the activity 

before the current illness, exacerbation or injury.


10-Not Attempted due to Environmental Limitations-(lack of equipment, weather 

restraints, etc.).


88-Not Attempted due to Medical Conditions or Safety Concerns.


On/Off Footwear:  2


Toileting Hygiene (QC):  1


Toilet Transfer (QC):  1





Other Treatment


Pt assisted with propelling w/c, assist to steer.  Pt then working on 

coordinated movement with B UE then B LE.  Pt requires concentration on 1 

movement at a time, difficulty with motor planning.  Pt is demonstrating AROM 

throughout R UE though requires reminders to use R UE for helper during B UE 

tasks.  See PT notes for pt's standing progress.  After session, pt lying in bed

on R side with call light/phone in reach.  All needs met in room.





OT Short Term Goals


Short Term Goals


Time Frame:  Mar 28, 2023


Eating:  3


Toileting hygiene:  3


Shower/bathe self:  3


Lower body dressing:  3


Putting on/taking off footwear:  3





OT Long Term Goals


Long Term Goals


Time Frame:  2023


Acute change in mental status:  1


Inattention:  0


Disorganized thinkin


Altered level of consciousness:  0


Eating (QC):  5


Oral Hygiene (QC):  5


Toileting Hygiene (QC):  4


Shower/Bathe Self (QC):  4


Upper Body Dressing (QC):  5


Lower Body Dressing (QC):  4


On/Off Footwear (QC):  4


Additional Goals:  1-Demonstrate ADL Tasks, 2-Verbalize Understanding, 3-

ImproveStrength/Russ


1=Demonstrate adherence to instructed precautions during ADL tasks.


2=Patient will verbalize/demonstrate understanding of assistive devices/modifica

tions for ADL.


3=Patient will improve strength/tolerance for activity to enable patient to 

perform ADL's.





OT Education/Plan


Problem List/Assessment


Assessment:  Decreased Activ Tolerance, Dependent Transfers, Impaired Self-Care 

Skills, Restricted Funct UE ROM





Discharge Recommendations


Plan/Recommendations:  Continue POC





Treatment Plan/Plan of Care


Patient would benefit from OT for education, treatment and training to promote 

independence in ADL's, mobility, safety and/or upper extremity function for 

ADL's.


Plan of Care:  ADL Retraining, Functional Mobility, Group Exercise/Act as Ind, 

UE Funct Exercise/Act, UE Neuromus Re-Ed/Coord, Visual/Perceptual Retrain, W/C 

Management Training


Treatment Duration:  2023


Frequency:  At least 5 of 7 days/Wk (IRF)


Estimated Hrs Per Day:  1.5 hours per day


Agreement:  Yes


Rehab Potential:  Guarded





Time


Start Time:  09:00


Stop Time:  10:00


DATE:  Mar 28, 2023


Total Time Billed (hr/min):  60


Billed Treatment Time


1 visit-ADL 1 (20 min)  NM 3 (40 min)  co-treat with PT [id number]











ARTIE MC               Mar 28, 2023 12:34

## 2023-03-28 NOTE — PROGRESS NOTE
GRACIELA PORTER 3/28/23 1218:


Progress Note


I have reviewed all therapy notes of this patient who was admitted to the [facility] rehab unit 3/15 for recovery following a CVA with right sided weakness 

for which she was originally treated at [facility] in [place], [place]. Upon 

arrival she began working with PT/OT/ST.





Initially working with PT she required moderate to total assistance with 

transfers and substantial assistance with wheelchair use. Patient does not walk 

and it was not a goal of therapy. Throughout her stay she experienced minimal 

improvement in transfers and required a sit-to-stand lift to transfer safely. 

She did improve in her ability to use a wheel chair on her own, although does 

have residual weakness in her RUE that can cause her to drift to the right. 





OT worked with patient on ADL's. Initially she was independent whith oral hy

geine, requiring supervision while eating, and requiring partial to complete 

assistance with bathing, dressing, and toileting. By 3/28 patient was able to 

eat and perform oral hygeine with independence, dress her upper body with setup,

and bath with moderate assistance. She was still entirely dependent for LE 

dressing & footwear, and toileting. 





ST performed swallow evalutation where pt was found to have difficulties and 

worked with patient on word finding techniques as she was having residual 

dysphagia from CVA. Pt's ability to properly protect airway while eating was a 

concern during her stay. She required thin liquids and her pills needed to be 

crushed for the majority of her stay. By 3/28she was able to tolerate medication

whole. Her dysphagia did improve during her stay with the ability to display 

100% accuracy on word-finding exercises with ST.





JANIE CHRISTIE DO 3/29/23 0514:


Supervisory-Addendum Brief


Verification & Attestation


Participated in pt care:  history, MDM, physical


Personally performed:  exam, history, MDM, supervision of care


Care discussed with:  Medical Student


Procedures:  n/a


Results interpretation:  Verified all documentation


Verification and Attestation of Medical Student E/M Service





A medical student performed and documented this service in my presence. I 

reviewed and verified all information documented by the medical student and made

modifications to such information, when appropriate. I personally performed the 

physical exam and medical decision making. 





 Janie Christie Mar 29, 2023,05:14











GRACIELA PORTER                Mar 28, 2023 12:18


JANIE CHRISTIE DO                Mar 29, 2023 05:14

## 2023-03-28 NOTE — PM&R PROGRESS NOTE
Subjective


HPI/CC On Admission


Date Seen by Provider:  Mar 28, 2023


Time Seen by Provider:  09:00


Subjective/Events-last exam


3/28/2023:


Doing well


Improved transfers


No pain reported


Robles cath is working well








3/27/2023:


Much improved


Overall having no new issues


Robles catheter is working very well








3/26/2023:


No major issues


Changed catheter and now not leaking and not causing spasms


No other issues


Labs will be checked tomorrow








3/25/2023:


Improved today


Adjusted robles catheter


Pain controlled


Eating well








3/24/2023:


Doing well


 at bedside


No neuro deficits last night


Pain comes and goes with chronic neuropathy


HLIVF








3/23/2023:


Improved status


No pain reported


Had an episode last evening of confusion and vision changes but vitals remained 

stable


Eating some but fluids minimal and UOP decreased so will initiate IVF gently








3/22/2023:


Much improved status 


Robles cath still in place


Urecholine maintained


Infection risk with in-dwelling cath


Very debilitated so unsure she could perform in/out caths at this time








3/21/2023:


No major events


Pain controlled


Dr Walker called me to update me on the MRI findings


No otseo noted under the wound








3/20/2023:


No major events


Improved transfers


Catheter removed but later could not void so replaced robles


Sugars monitored


Dr Walker consulted for wound








3/19/2023:


Much improved


Resting today


Family at bedside


No pain reported


Will DC catheter tomorrow








3/18/2023:


No major issues


DC tube feeding to prevent overfeeding


Dysarthria improved


Reviewed meds








3/17/2023:


Much improved


Participation is good


No falls


Pain is chronic neuropathy


Reinserted Robles catheter due to retention last night








3/16/2023:


Improved dramatically


Stood for 35 seconds with parallel bars


No pain reported except wound


Family at bedside








3/15/2023:


Improved overall


Family at bedside


Wound vac on hold due to bacteria in the wound culture


Dr Zamorano managing the IV abx 








3/14/2023:


Doing much better


Reviewed back history on her pre-existing debility:


10/5/22 gastric ulcer perforation but used wheelchair periodically prior to that

due to neuropathy


11/4/22 Decubitus ulcer admit stage III wound vac placed


1/29/23 used walker but wheelchair for long distances


Speech will see her today





Review of Systems


General:  Fatigue, Malaise





Objective


Exam


Vital Signs





Vital Signs








  Date Time  Temp Pulse Resp B/P (MAP) Pulse Ox O2 Delivery O2 Flow Rate FiO2


 


3/28/23 20:30     94 Room Air  


 


3/28/23 20:18 36.4 91 24 118/67 (84)    


 


3/26/23 08:00       0.00 





Capillary Refill :


General Appearance:  No Apparent Distress, WD/WN, Anxious, Chronically ill


HEENT:  PERRL/EOMI, Normal ENT Inspection, Pharynx Normal


Neck:  Full Range of Motion, Normal Inspection, Non Tender, Supple, Carotid 

Bruit


Respiratory:  Chest Non Tender, Lungs Clear, Normal Breath Sounds, No Accessory 

Muscle Use, No Respiratory Distress


Cardiovascular:  Regular Rate, Rhythm, No Edema, No Gallop, No JVD, No Murmur, 

Normal Peripheral Pulses


Gastrointestinal:  Normal Bowel Sounds, No Organomegaly, No Pulsatile Mass, Non 

Tender, Soft


Back:  Normal Inspection, No CVA Tenderness, No Vertebral Tenderness


Extremity:  Normal Capillary Refill, Normal Inspection, Normal Range of Motion, 

Non Tender, No Calf Tenderness, No Pedal Edema


Neurologic/Psychiatric:  Alert, Oriented x3, CNs II-XII Norm as Tested, Abnormal

CNs II-XII, Depressed Affect, Facial Droop, Motor Weakness


Skin:  Normal Color, Warm/Dry


Lymphatic:  No Adenopathy





Results/Procedures


Lab


Patient resulted labs reviewed.





FIM


Transfers


Therapy Code Descriptions/Definitions 





Functional [place] Measure:


0=Not Assessed/NA        4=Minimal Assistance


1=Total Assistance        5=Supervision or Setup


2=Maximal Assistance  6=Modified [place]


3=Moderate Assistance 7=Complete IndependenceSCALE: Activities may be completed 

with or without assistive devices.





6-Indepedent-patient completes the activity by him/herself with no assistance f

rom a helper.


5-Set-up or Clean-up Assistance-helper sets up or cleans up; patient completes 

activity. [place] assists only prior to or  


    following the activity.


4-Supervision or Touching Assistance-helper provides verbal cues and/or 

touching/steadying and/or contact guard assistance as patient completes 

activity. Assistance may be provided   


    throughout the activity or intermittently.


3-Partial/Moderate Assistance-helper does LESS THAN HALF the effort. [place] 

lifts, holds or supports trunk or limbs, but provides less than half the effort.


2-Substantial/Maximal Assistance-helper does MORE THAN HALF the effort. [place] 

lifts or holds trunk or limbs and provides more than half the effort.


9-Sccvsfmml-rxbdse does ALL the effort. Patient does none of the effort to 

complete the activity. Or, the assistance of 2 or more helpers is required for 

the patient to complete the  


    activity.


If activity was not attempted, code reason:


7-Patient Refused.


9-Not Applicable-not attempted and the patient did not perform the activity 

before the current illness, exacerbation or injury.


10-Not Attempted due to Environmental Limitations-(lack of equipment, weather 

restraints, etc.).


88-Not Attempted due to Medical Conditions or Safety Concerns.


Roll Left to Right (QC):  2


Sit to Lying (QC):  3 (Mod (A) for LE's)


Sit to Stand (QC):  1 (Performed with standing lift.  Patient stood for 5 

minutes with shoes on feet while mother performed lisy hygiene (BM).  Patient b

ecame dizzy and was sat back to EOB with standing lift /p linens replaced and 

straightened.  )


Chair/Bed-to-Chair Xfer(QC):  1


Car Transfer (QC):  88





Gait Training


Does the Patient Walk?:  No and Walking Goal NOT indicated


Walk 10 feet (QC):  88


Walk 50 ft with 2 Turns(QC):  88


Walk 150 ft (QC):  88


Walking 10ft/uneven surface-QC:  88





Wheelchair Training


Does the Pt Use a Wheelchair?:  Yes


Wheel 50 ft with 2 turns (QC):  3


Wheel 150 ft (QC):  4


Type of Wheelchair:  Manual





Stair Training


1 Step (curb) (QC):  88


4 Steps (QC):  88


12 Steps (QC):  88





Balance


Picking up an Object (QC):  88





ADL-Treatment


Eating (QC):  6


Oral Hygiene (QC):  6


Shower/Bathe Self (QC):  3


Upper Body Dressing (QC):  5


Lower Body Dressing (QC):  1


On/Off Footwear (QC):  1


Toileting Hygiene (QC):  1


Toilet Transfer (QC):  1





Assessment/Plan


Assessment and Plan


Assess & Plan/Chief Complaint


Assessment:


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


Infective Endocarditis of Atrial Valve


Dysarthria/expressive aphasia


Dysphagia with PEG tube


Right Knee Effusion 


Sacral Decubitus Ulcer with MRSA so started Vanc


Anemia-iron def so ordered Venofer


T2DM - ID


Hypothyroidism


Constipation


Hyperlipidemia


GERD


Chronic debility: (10/5/22 gastric ulcer perforation but used wheelchair 

periodically prior to that due to neuropathy, 11/4/22 Decubitus ulcer admit 

stage III wound vac placed, 1/29/23 used walker but wheelchair for long 

distances)


Urinary retention attempted to DC catheter 3/16/23 and required replacement of 

cath 3/17/23





Plan:


Monitor closely


Change Synthroid to PO


ST consult for dysphagia


Utilize PEG


PT OT





3/14/2023:


Monitor closely


Speech therapy to advanced diet if able





3/15/2023:


Monitor closely


Wound care





3/16/2023:


Dramatically improved


Wound care


IV abx





3/17/2023:


Urecholine


Monitor closely





3/18/2023:


DC tube feeding





3/19/2023:


DC catheter tomorrow








3/20/2023:


Wound care


Robles cath replaced





3/21/2023:


Monitor pain


IV abx





3/22/2023:


Maintain cath


Monitor closely





3/23/2023:


IVF for dehydration





3/24/2023:


HLIVF





3/25/2023:


Adjusted catheter


Azo





3/26/2023:


Monitor closely


Fall risk





3/27/2023:


Change Pyridium to prn





3/28/2023:


Monitoring closely





(1) CVA (cerebral vascular accident)











COOKIE CHRISTIE 28, 2023 06:44

## 2023-03-28 NOTE — OCCUPATIONAL THER DAILY NOTE
OT Current Status-Daily Note


Subjective


Pt alert, lying in bed.  Pt agrees to therapy.  Pt c/o pain, R knee and lower 

back, Completed moving/slow stretch to decrease pain.





Mental Status/Objective


Patient Orientation:  Person, Place, Time, Situation


Attachments:  Drains, [place] Catheter, IV





ADL-Treatment


Mod A for supine to EOB.  Transfers with sit to stand.  Independent with oral 

care and grooming.  Dependent with toileting, bowel incontinence.  After 

session, pt sitting in w/c at sink completing grooming.  Nrsg aware of pt's 

position.  Call light within reach.  All needs met.


Therapy Code Descriptions/Definitions 





Functional [place] Measure:


0=Not Assessed/NA        4=Minimal Assistance


1=Total Assistance        5=Supervision or Setup


2=Maximal Assistance  6=Modified [place]


3=Moderate Assistance 7=Complete IndependenceSCALE: Activities may be completed 

with or without assistive devices.





6-Indepedent-patient completes the activity by him/herself with no assistance 

from a helper.


5-Set-up or Clean-up Assistance-helper sets up or cleans up; patient completes 

activity. [place] assists only prior to or  


    following the activity.


4-Supervision or Touching Assistance-helper provides verbal cues and/or 

touching/steadying and/or contact guard assistance as patient completes 

activity. Assistance may be provided   


    throughout the activity or intermittently.


3-Partial/Moderate Assistance-helper does LESS THAN HALF the effort. [place] 

lifts, holds or supports trunk or limbs, but provides less than half the effort.


2-Substantial/Maximal Assistance-helper does MORE THAN HALF the effort. [place] 

lifts or holds trunk or limbs and provides more than half the effort.


6-Qlvbfbxeq-vvdizq does ALL the effort. Patient does none of the effort to 

complete the activity. Or, the assistance of 2 or more helpers is required for 

the patient to complete the  


    activity.


If activity was not attempted, code reason:


7-Patient Refused.


9-Not Applicable-not attempted and the patient did not perform the activity 

before the current illness, exacerbation or injury.


10-Not Attempted due to Environmental Limitations-(lack of equipment, weather 

restraints, etc.).


88-Not Attempted due to Medical Conditions or Safety Concerns.


Oral Hygiene (QC):  6


Toileting Hygiene (QC):  1


Toilet Transfer (QC):  1





OT Short Term Goals


Short Term Goals


Time Frame:  Mar 28, 2023


Eating:  3


Toileting hygiene:  3


Shower/bathe self:  3


Lower body dressing:  3


Putting on/taking off footwear:  3





OT Long Term Goals


Long Term Goals


Time Frame:  2023


Acute change in mental status:  1


Inattention:  0


Disorganized thinkin


Altered level of consciousness:  0


Eating (QC):  5


Oral Hygiene (QC):  5


Toileting Hygiene (QC):  4


Shower/Bathe Self (QC):  4


Upper Body Dressing (QC):  5


Lower Body Dressing (QC):  4


On/Off Footwear (QC):  4


Additional Goals:  1-Demonstrate ADL Tasks, 2-Verbalize Understanding, 3-

ImproveStrength/Russ


1=Demonstrate adherence to instructed precautions during ADL tasks.


2=Patient will verbalize/demonstrate understanding of assistive 

devices/modifications for ADL.


3=Patient will improve strength/tolerance for activity to enable patient to 

perform ADL's.





OT Education/Plan


Problem List/Assessment


Assessment:  Decreased Activ Tolerance, Decreased UE Strength, Dependent 

Transfers, Impaired Bed Mobility, Impaired Self-Care Skills, Restricted Funct UE

ROM





Discharge Recommendations


Plan/Recommendations:  Continue POC





Treatment Plan/Plan of Care


Patient would benefit from OT for education, treatment and training to promote 

independence in ADL's, mobility, safety and/or upper extremity function for 

ADL's.


Plan of Care:  ADL Retraining, Functional Mobility, Group Exercise/Act as Ind, 

UE Funct Exercise/Act, UE Neuromus Re-Ed/Coord, Visual/Perceptual Retrain, W/C 

Management Training


Treatment Duration:  2023


Frequency:  At least 5 of 7 days/Wk (IRF)


Estimated Hrs Per Day:  1.5 hours per day


Agreement:  Yes


Rehab Potential:  Guarded





Time


Start Time:  08:00


Stop Time:  08:30


DATE:  Mar 28, 2023


Total Time Billed (hr/min):  30


Billed Treatment Time


1 visit-ADL 2 (30 min)











ARTIE MC               Mar 28, 2023 12:29

## 2023-03-28 NOTE — PHYSICAL THERAPY DAILY NOTE
PT Daily Note-Current


Subjective


Pt found seated in WC with nurse present. Agreed to PT. No pain reported pre-

treatment. Co-treat with OT for energy conservation.





Pain


Section J - Health Conditions


1. Rarely or not at all


2. Occasionally


3. Frequently


4. Almost constantly


8. Unable to answer


Pain Effect on Sleep:  1


Pain Interference with Therapy:  2


Pain Interference w/Day-to-Day:  2





Mental Status


Patient Orientation:  Person


Attachments:  Drains, [place] Catheter





Transfers


SCALE: Activities may be completed with or without assistive devices.





6-Indepedent-patient completes the activity by him/herself with no assistance 

from a helper.


5-Set-up or Clean-up Assistance-helper sets up or cleans up; patient completes 

activity. [place] assists only prior to or  


    following the activity.


4-Supervision or Touching Assistance-helper provides verbal cues and/or 

touching/steadying and/or contact guard assistance as patient completes 

activity. Assistance may be provided   


    throughout the activity or intermittently.


3-Partial/Moderate Assistance-helper does LESS THAN HALF the effort. [place] 

lifts, holds or supports trunk or limbs, but provides less than half the effort.


2-Substantial/Maximal Assistance-helper does MORE THAN HALF the effort. [place] 

lifts or holds trunk or limbs and provides more than half the effort.


8-Cyulvnlzl-audxfx does ALL the effort. Patient does none of the effort to 

complete the activity. Or, the assistance of 2 or more helpers is required for 

the patient to complete the  


    activity.


If activity was not attempted, code reason:


7-Patient Refused.


9-Not Applicable-not attempted and the patient did not perform the activity 

before the current illness, exacerbation or injury.


10-Not Attempted due to Environmental Limitations-(lack of equipment, weather 

restraints, etc.).


88-Not Attempted due to Medical Conditions or Safety Concerns.


Sit to Lying (QC):  3


Sit to Stand (QC):  1


Pt 2 person assist with sit to stand machine. MOD assist with LEs with sit to 

lying transfer.





Weight Bearing


Weight Bearing/Tolerated


Weight Bearing/Tolerated





Gait Training


Does the Patient Walk?:  No and Walking Goal IS indicated





Wheelchair Training


Does the Pt Use a Wheelchair?:  Yes


Wheel 50 ft with 2 turns (QC):  3


Type of Wheelchair:  Manual


MIN assist for steering and avoiding objects on affected side.





Exercises


Partial sit to stands 5x /c 2 person assist





Seated exercise: Hip abd/add /c ball, modified quad set /c AirX, heel/toe 

raises, heel slides x 10 each.





Assessment


Current Status:  Fair Progress


Pt displays limited muscle strength and endurance throughout visit. Dependent 

with sit to stand transfers using sit to stand machine. Pt completes partial sit

to stand transfers with 2 person assist but is unable to come to upright 

position. Continue to progress pt as tolerated per POC to increase strength, 

endurance, and functional ability.





PT Long Term Goals


Long Term Goals


PT Long Term Goals Time Frame:  May 8, 2023


Roll Left & Right (QC):  4


Sit to Lying (QC):  4


Lying-Sitting on Side/Bed(QC):  4


Sit to Stand (QC):  2


Chair/Bed-to-Chair Xfer(QC):  2


Toilet Transfer (QC):  2


Car Transfer (QC):  2


Does the Patient Walk:  No and Walking Goal NOT indicated


Walk 10 feet (QC):  1


Walk 50ft with 2 Turns (QC):  88


Walk 150 ft (QC):  88


Walking 10ft on Uneven Surface:  88


1 Step (curb) (QC):  88


4 Steps (QC):  88


12 Steps (QC):  88


Picking up an Object (QC):  88


Does the Pt use WC or Scooter?:  Yes


Wheel 50 feet with 2 turns (QC:  5 (Patient able to use (L) LE and (B) LE's for 

w/c propulsion)


Type:  Manual


Wheel 150 feet:  5


Type:  Manual





PT Plan


Treatment/Plan


Treatment Plan:  Continue Plan of Care


Treatment Plan:  Bed Mobility, Concurrent Therapy, Education, Functional 

Activity Russ, Functional Strength, Group Therapy, Safety, Therapeutic 

Exercise, Transfers, Other (W/C mobility)


Treatment Duration:  May 8, 2023


Frequency:  At least 5 of 7 days/Wk (IRF)


Estimated Hrs Per Day:  1.5 hours per day


Patient and/or Family Agrees t:  Yes





Time


Time In:  0900


Time Out:  1000


DATE:  Mar 28, 2023


Total Billed Treatment Time:  60


Total Billed Treatment


1 visit


FA x 2


EX x 2





Co-treatment time: 60 minutes


Total treatment time: 60 minutes











PADDY SINGH PTA                Mar 28, 2023 12:37

## 2023-03-29 VITALS — SYSTOLIC BLOOD PRESSURE: 135 MMHG | DIASTOLIC BLOOD PRESSURE: 59 MMHG

## 2023-03-29 VITALS — DIASTOLIC BLOOD PRESSURE: 56 MMHG | SYSTOLIC BLOOD PRESSURE: 117 MMHG

## 2023-03-29 RX ADMIN — POLYETHYLENE GLYCOL (3350) SCH GM: 17 POWDER, FOR SOLUTION ORAL at 08:07

## 2023-03-29 RX ADMIN — Medication SCH EACH: at 21:45

## 2023-03-29 RX ADMIN — PREGABALIN SCH MG: 75 CAPSULE ORAL at 21:47

## 2023-03-29 RX ADMIN — SUCRALFATE SCH GM: 1 TABLET ORAL at 17:04

## 2023-03-29 RX ADMIN — FOLIC ACID SCH MG: 1 TABLET ORAL at 08:03

## 2023-03-29 RX ADMIN — ENOXAPARIN SODIUM SCH MG: 100 INJECTION SUBCUTANEOUS at 17:08

## 2023-03-29 RX ADMIN — BETHANECHOL CHLORIDE SCH MG: 25 TABLET ORAL at 21:46

## 2023-03-29 RX ADMIN — Medication SCH MCG: at 06:07

## 2023-03-29 RX ADMIN — PREGABALIN SCH MG: 75 CAPSULE ORAL at 08:03

## 2023-03-29 RX ADMIN — SUCRALFATE SCH GM: 1 TABLET ORAL at 06:07

## 2023-03-29 RX ADMIN — NYSTATIN SCH GM: 100000 CREAM TOPICAL at 13:22

## 2023-03-29 RX ADMIN — Medication SCH EACH: at 08:03

## 2023-03-29 RX ADMIN — MELATONIN 3 MG ORAL TABLET SCH MG: 3 TABLET ORAL at 21:46

## 2023-03-29 RX ADMIN — NYSTATIN SCH GM: 100000 CREAM TOPICAL at 21:50

## 2023-03-29 RX ADMIN — DOCUSATE SODIUM SCH MG: 100 CAPSULE ORAL at 21:49

## 2023-03-29 RX ADMIN — INSULIN ASPART SCH UNIT: 100 INJECTION, SOLUTION INTRAVENOUS; SUBCUTANEOUS at 06:08

## 2023-03-29 RX ADMIN — DOCUSATE SODIUM AND SENNOSIDES SCH EA: 8.6; 5 TABLET, FILM COATED ORAL at 08:07

## 2023-03-29 RX ADMIN — INSULIN ASPART SCH UNIT: 100 INJECTION, SOLUTION INTRAVENOUS; SUBCUTANEOUS at 12:38

## 2023-03-29 RX ADMIN — DOCUSATE SODIUM AND SENNOSIDES SCH EA: 8.6; 5 TABLET, FILM COATED ORAL at 21:49

## 2023-03-29 RX ADMIN — Medication SCH ML: at 06:07

## 2023-03-29 RX ADMIN — BETHANECHOL CHLORIDE SCH MG: 25 TABLET ORAL at 17:04

## 2023-03-29 RX ADMIN — LIDOCAINE SCH EA: 50 PATCH CUTANEOUS at 08:03

## 2023-03-29 RX ADMIN — POTASSIUM CHLORIDE SCH MEQ: 750 TABLET, FILM COATED, EXTENDED RELEASE ORAL at 17:05

## 2023-03-29 RX ADMIN — METFORMIN HYDROCHLORIDE SCH MG: 500 TABLET, FILM COATED ORAL at 17:05

## 2023-03-29 RX ADMIN — METFORMIN HYDROCHLORIDE SCH MG: 500 TABLET, FILM COATED ORAL at 12:36

## 2023-03-29 RX ADMIN — FAMOTIDINE SCH MG: 20 TABLET, FILM COATED ORAL at 21:45

## 2023-03-29 RX ADMIN — Medication SCH ML: at 21:50

## 2023-03-29 RX ADMIN — INSULIN ASPART SCH UNIT: 100 INJECTION, SOLUTION INTRAVENOUS; SUBCUTANEOUS at 21:49

## 2023-03-29 RX ADMIN — MICONAZOLE NITRATE SCH APPLIC: 20 POWDER TOPICAL at 08:07

## 2023-03-29 RX ADMIN — SUCRALFATE SCH GM: 1 TABLET ORAL at 21:48

## 2023-03-29 RX ADMIN — BETHANECHOL CHLORIDE SCH MG: 25 TABLET ORAL at 12:36

## 2023-03-29 RX ADMIN — Medication SCH ML: at 13:22

## 2023-03-29 RX ADMIN — POTASSIUM CHLORIDE SCH MEQ: 750 TABLET, FILM COATED, EXTENDED RELEASE ORAL at 08:02

## 2023-03-29 RX ADMIN — SUCRALFATE SCH GM: 1 TABLET ORAL at 12:37

## 2023-03-29 RX ADMIN — LEVOTHYROXINE SODIUM SCH MCG: 100 TABLET ORAL at 06:08

## 2023-03-29 RX ADMIN — INSULIN ASPART SCH UNIT: 100 INJECTION, SOLUTION INTRAVENOUS; SUBCUTANEOUS at 17:08

## 2023-03-29 RX ADMIN — NYSTATIN SCH GM: 100000 CREAM TOPICAL at 08:07

## 2023-03-29 RX ADMIN — BETHANECHOL CHLORIDE SCH MG: 25 TABLET ORAL at 06:07

## 2023-03-29 RX ADMIN — POLYETHYLENE GLYCOL (3350) SCH GM: 17 POWDER, FOR SOLUTION ORAL at 21:49

## 2023-03-29 RX ADMIN — MICONAZOLE NITRATE SCH APPLIC: 20 POWDER TOPICAL at 21:49

## 2023-03-29 RX ADMIN — FAMOTIDINE SCH MG: 20 TABLET, FILM COATED ORAL at 08:02

## 2023-03-29 RX ADMIN — VANCOMYCIN HYDROCHLORIDE SCH MLS/HR: 500 INJECTION, POWDER, LYOPHILIZED, FOR SOLUTION INTRAVENOUS at 13:22

## 2023-03-29 RX ADMIN — DOCUSATE SODIUM SCH MG: 100 CAPSULE ORAL at 08:08

## 2023-03-29 RX ADMIN — AMITRIPTYLINE HYDROCHLORIDE SCH MG: 25 TABLET, FILM COATED ORAL at 21:47

## 2023-03-29 NOTE — OCCUPATIONAL THER DAILY NOTE
OT Current Status-Daily Note


Subjective


Pt alert, lying in bed.  Pt agrees to therapy.  No c/o pain.  PT/OT co-treat 

([id number]), skills of 2 clinicians required to decrease fall risk, increase 

motor coordination b/t UE/LE during mobility tasks.  PT focusing on transfers, B

LE's and standing while OT focusing on functional movement of B UE's during 

tasks.





Mental Status/Objective


Patient Orientation:  Person, Place, Time, Situation


Attachments:  Drains (wound vac), [place] Catheter, IV





ADL-Treatment


Therapy Code Descriptions/Definitions 





Functional [place] Measure:


0=Not Assessed/NA        4=Minimal Assistance


1=Total Assistance        5=Supervision or Setup


2=Maximal Assistance  6=Modified [place]


3=Moderate Assistance 7=Complete IndependenceSCALE: Activities may be completed 

with or without assistive devices.





6-Indepedent-patient completes the activity by him/herself with no assistance 

from a helper.


5-Set-up or Clean-up Assistance-helper sets up or cleans up; patient completes 

activity. [place] assists only prior to or  


    following the activity.


4-Supervision or Touching Assistance-helper provides verbal cues and/or 

touching/steadying and/or contact guard assistance as patient completes activit

y. Assistance may be provided   


    throughout the activity or intermittently.


3-Partial/Moderate Assistance-helper does LESS THAN HALF the effort. [place] 

lifts, holds or supports trunk or limbs, but provides less than half the effort.


2-Substantial/Maximal Assistance-helper does MORE THAN HALF the effort. [place] 

lifts or holds trunk or limbs and provides more than half the effort.


8-Osikzxxln-altoih does ALL the effort. Patient does none of the effort to 

complete the activity. Or, the assistance of 2 or more helpers is required for 

the patient to complete the  


    activity.


If activity was not attempted, code reason:


7-Patient Refused.


9-Not Applicable-not attempted and the patient did not perform the activity 

before the current illness, exacerbation or injury.


10-Not Attempted due to Environmental Limitations-(lack of equipment, weather 

restraints, etc.).


88-Not Attempted due to Medical Conditions or Safety Concerns.





Other Treatment


Pt assisted with propelling w/c, assist to steer.  Pt then working on 

coordinated movement with B UE then B LE.  Pt requires concentration on 1 

movement at a time, difficulty with motor planning.  Pt is demonstrating AROM 

throughout R UE though requires reminders to use R UE for helper during B UE 

tasks.  See PT notes for pt's standing progress.  Stood at //bar 4x's, max A x2 

and standing for 30 secs to 1 min.  Sit to stand lift for all transfers.  Pt 

does assist with bed mobility.  After session, pt lying in bed on R side with 

call light/phone in reach.  All needs met in room.





OT Short Term Goals


Short Term Goals


Time Frame:  Mar 28, 2023


Eating:  3


Toileting hygiene:  3


Shower/bathe self:  3


Lower body dressing:  3


Putting on/taking off footwear:  3





OT Long Term Goals


Long Term Goals


Time Frame:  2023


Acute change in mental status:  1


Inattention:  0


Disorganized thinkin


Altered level of consciousness:  0


Eating (QC):  5


Oral Hygiene (QC):  5


Toileting Hygiene (QC):  4


Shower/Bathe Self (QC):  4


Upper Body Dressing (QC):  5


Lower Body Dressing (QC):  4


On/Off Footwear (QC):  4


Additional Goals:  1-Demonstrate ADL Tasks, 2-Verbalize Understanding, 3-

ImproveStrength/Russ


1=Demonstrate adherence to instructed precautions during ADL tasks.


2=Patient will verbalize/demonstrate understanding of assistive 

devices/modifications for ADL.


3=Patient will improve strength/tolerance for activity to enable patient to 

perform ADL's.





OT Education/Plan


Problem List/Assessment


Assessment:  Decreased Activ Tolerance, Decreased UE Strength, Dependent 

Transfers, Impaired Bed Mobility, Impaired I ADL's, Impaired Self-Care Skills, 

Restricted Funct UE ROM





Discharge Recommendations


Plan/Recommendations:  Continue POC





Treatment Plan/Plan of Care


Patient would benefit from OT for education, treatment and training to promote 

independence in ADL's, mobility, safety and/or upper extremity function for 

ADL's.


Plan of Care:  ADL Retraining, Functional Mobility, Group Exercise/Act as Ind, 

UE Funct Exercise/Act, UE Neuromus Re-Ed/Coord, Visual/Perceptual Retrain, W/C 

Management Training


Treatment Duration:  2023


Frequency:  At least 5 of 7 days/Wk (IRF)


Estimated Hrs Per Day:  1.5 hours per day


Agreement:  Yes


Rehab Potential:  Guarded





Time


Start Time:  09:00


Stop Time:  10:00


DATE:  Mar 29, 2023


Total Time Billed (hr/min):  60


Billed Treatment Time


1 visit-FA 1 (15 min)  NM 3 (30 min)  co-treat with PT [id number]











ARTIE MC               Mar 29, 2023 10:00

## 2023-03-29 NOTE — OCCUPATIONAL THER DAILY NOTE
OT Current Status-Daily Note


Subjective


Pt alert, lying in bed.  Pt agrees to therapy.  No c/o pain at this time.  

States that she feel better today.





Mental Status/Objective


Patient Orientation:  Person, Place, Time, Situation


Attachments:  Drains (wound vac), Clayton Catheter, IV





ADL-Treatment


After set up, pt able to wash face prior to breakfast.  Pt is able to assist 

with positioning in bed for breakfast tray.  Max A for AURELIANO hose.  ALLEN placed 

edema glove on R hand, edema has decreased since placing yesterday.  Pt 

independent with eating today, opens own packages and lids on cups.  After 

session, pt sitting up in bed eating breakfast.  Call light/phone in reach.  All

needs met in room.


Therapy Code Descriptions/Definitions 





Functional [place] Measure:


0=Not Assessed/NA        4=Minimal Assistance


1=Total Assistance        5=Supervision or Setup


2=Maximal Assistance  6=Modified [place]


3=Moderate Assistance 7=Complete IndependenceSCALE: Activities may be completed 

with or without assistive devices.





6-Indepedent-patient completes the activity by him/herself with no assistance 

from a helper.


5-Set-up or Clean-up Assistance-helper sets up or cleans up; patient completes 

activity. [place] assists only prior to or  


    following the activity.


4-Supervision or Touching Assistance-helper provides verbal cues and/or kimi

kia/steadying and/or contact guard assistance as patient completes activity. 

Assistance may be provided   


    throughout the activity or intermittently.


3-Partial/Moderate Assistance-helper does LESS THAN HALF the effort. [place] 

lifts, holds or supports trunk or limbs, but provides less than half the effort.


2-Substantial/Maximal Assistance-helper does MORE THAN HALF the effort. [place] 

lifts or holds trunk or limbs and provides more than half the effort.


1-Riqaparwl-ljzziq does ALL the effort. Patient does none of the effort to 

complete the activity. Or, the assistance of 2 or more helpers is required for 

the patient to complete the  


    activity.


If activity was not attempted, code reason:


7-Patient Refused.


9-Not Applicable-not attempted and the patient did not perform the activity 

before the current illness, exacerbation or injury.


10-Not Attempted due to Environmental Limitations-(lack of equipment, weather 

restraints, etc.).


88-Not Attempted due to Medical Conditions or Safety Concerns.


Eating (QC):  6


On/Off Footwear:  2





OT Short Term Goals


Short Term Goals


Time Frame:  Mar 28, 2023


Eating:  3


Toileting hygiene:  3


Shower/bathe self:  3


Lower body dressing:  3


Putting on/taking off footwear:  3





OT Long Term Goals


Long Term Goals


Time Frame:  2023


Acute change in mental status:  1


Inattention:  0


Disorganized thinkin


Altered level of consciousness:  0


Eating (QC):  5


Oral Hygiene (QC):  5


Toileting Hygiene (QC):  4


Shower/Bathe Self (QC):  4


Upper Body Dressing (QC):  5


Lower Body Dressing (QC):  4


On/Off Footwear (QC):  4


Additional Goals:  1-Demonstrate ADL Tasks, 2-Verbalize Understanding, 3-

ImproveStrength/Russ


1=Demonstrate adherence to instructed precautions during ADL tasks.


2=Patient will verbalize/demonstrate understanding of assistive 

devices/modifications for ADL.


3=Patient will improve strength/tolerance for activity to enable patient to 

perform ADL's.





OT Education/Plan


Problem List/Assessment


Assessment:  Decreased Activ Tolerance, Decreased UE Strength, Impaired Bed 

Mobility, Impaired Self-Care Skills, Restricted Funct UE ROM





Discharge Recommendations


Plan/Recommendations:  Continue POC





Treatment Plan/Plan of Care


Patient would benefit from OT for education, treatment and training to promote 

independence in ADL's, mobility, safety and/or upper extremity function for 

ADL's.


Plan of Care:  ADL Retraining, Functional Mobility, Group Exercise/Act as Ind, 

UE Funct Exercise/Act, UE Neuromus Re-Ed/Coord, Visual/Perceptual Retrain, W/C 

Management Training


Treatment Duration:  2023


Frequency:  At least 5 of 7 days/Wk (IRF)


Estimated Hrs Per Day:  1.5 hours per day


Agreement:  Yes


Rehab Potential:  Guarded





Time


Start Time:  07:00


Stop Time:  07:15


DATE:  Mar 29, 2023


Total Time Billed (hr/min):  15


Billed Treatment Time


1 visit-ADL 1 (15 min)











ARTIE MC               Mar 29, 2023 07:23

## 2023-03-29 NOTE — PHYSICAL THERAPY DAILY NOTE
PT Daily Note-Current


Subjective


Pt found lying in bed upon entry. Agreed to PT. Reports that she is having some 

pain in her L knee and hip. Does not rate pain. PT/OT co-treatment for energy 

conservation.





Pain


Section J - Health Conditions


1. Rarely or not at all


2. Occasionally


3. Frequently


4. Almost constantly


8. Unable to answer


Pain Effect on Sleep:  1


Pain Interference with Therapy:  2


Pain Interference w/Day-to-Day:  2





Mental Status


Patient Orientation:  Person


Attachments:  Drains, Clayton Catheter





Transfers


SCALE: Activities may be completed with or without assistive devices.





6-Indepedent-patient completes the activity by him/herself with no assistance 

from a helper.


5-Set-up or Clean-up Assistance-helper sets up or cleans up; patient completes 

activity. [place] assists only prior to or  


    following the activity.


4-Supervision or Touching Assistance-helper provides verbal cues and/or 

touching/steadying and/or contact guard assistance as patient completes 

activity. Assistance may be provided   


    throughout the activity or intermittently.


3-Partial/Moderate Assistance-helper does LESS THAN HALF the effort. [place] 

lifts, holds or supports trunk or limbs, but provides less than half the effort.


2-Substantial/Maximal Assistance-helper does MORE THAN HALF the effort. [place] 

lifts or holds trunk or limbs and provides more than half the effort.


0-Rehomklgo-gypaft does ALL the effort. Patient does none of the effort to 

complete the activity. Or, the assistance of 2 or more helpers is required for 

the patient to complete the  


    activity.


If activity was not attempted, code reason:


7-Patient Refused.


9-Not Applicable-not attempted and the patient did not perform the activity 

before the current illness, exacerbation or injury.


10-Not Attempted due to Environmental Limitations-(lack of equipment, weather 

restraints, etc.).


88-Not Attempted due to Medical Conditions or Safety Concerns.


Roll Left & Right (QC):  3


Sit to Lying (QC):  3


Lying to Sitting/Side of Bed(Q:  3


Sit to Stand (QC):  1


Chair/Bed-to-Chair Xfer(QC):  1


Pt MOD assist with rolling. MIN assist with LE lifting and lowering with sit to 

ly and ly to sit transfers. Pt MAX two person assist with sit to stand 

transfers. Bed to WC and WC to bed transfer performed with sit to stand machine.





Weight Bearing


Weight Bearing/Tolerated


Weight Bearing/Tolerated





Gait Training


Does the Patient Walk?:  No and Walking Goal IS indicated





Wheelchair Training


Does the Pt Use a Wheelchair?:  Yes


Wheel 50 ft with 2 turns (QC):  3


Wheel 150 ft (QC):  3


Type of Wheelchair:  Manual


Pt MIN assist with propelling WC and with turns to L side. L armrest removed to 

allow for increased mobility. Wheeled 150 feet total.





Treatments


Sit to stands at // x 4 with RUE assist, tolerates standing for 15-30 minutes


Wheelchair mobility training





Assessment


Current Status:  Fair Progress


Pt displays poor standing tolerance likely due to muscle fatigue and weakness. 

MOD/MAX two person assist with sit to stands completed x 4. Sit to stand failed 

x 2 and pt was seated back in wheelchair. Pt given verbal cues for proper 

standing posture by helpers. Able to stand up to 30 seconds with assistance at 

parallel bars. L armrest removed from wheelchair for more LUE space during 

wheelchair mobility. Continue to progress pt as tolerated per POC to increase 

strength, endurance, and functional ability.





PT Long Term Goals


Long Term Goals


PT Long Term Goals Time Frame:  May 8, 2023


Roll Left & Right (QC):  4


Sit to Lying (QC):  4


Lying-Sitting on Side/Bed(QC):  4


Sit to Stand (QC):  2


Chair/Bed-to-Chair Xfer(QC):  2


Toilet Transfer (QC):  2


Car Transfer (QC):  2


Does the Patient Walk:  No and Walking Goal NOT indicated


Walk 10 feet (QC):  1


Walk 50ft with 2 Turns (QC):  88


Walk 150 ft (QC):  88


Walking 10ft on Uneven Surface:  88


1 Step (curb) (QC):  88


4 Steps (QC):  88


12 Steps (QC):  88


Picking up an Object (QC):  88


Does the Pt use WC or Scooter?:  Yes


Wheel 50 feet with 2 turns (QC:  5 (Patient able to use (L) LE and (B) LE's for 

w/c propulsion)


Type:  Manual


Wheel 150 feet:  5


Type:  Manual





PT Plan


Treatment/Plan


Treatment Plan:  Continue Plan of Care


Treatment Plan:  Bed Mobility, Concurrent Therapy, Education, Functional 

Activity Russ, Functional Strength, Group Therapy, Safety, Therapeutic 

Exercise, Transfers, Other (W/C mobility)


Treatment Duration:  May 8, 2023


Frequency:  At least 5 of 7 days/Wk (IRF)


Estimated Hrs Per Day:  1.5 hours per day


Patient and/or Family Agrees t:  Yes





Time


Time In:  0900


Time Out:  1000


DATE:  Mar 29, 2023


Total Billed Treatment Time:  60


Total Billed Treatment


1 visit


FA x 4





Co-treatment time: 60 minutes


Total treatment time: 60 minutes











PADDY SINGH PTA                Mar 29, 2023 11:11

## 2023-03-29 NOTE — PM&R PROGRESS NOTE
Subjective


HPI/CC On Admission


Date Seen by Provider:  Mar 29, 2023


Time Seen by Provider:  09:00


Subjective/Events-last exam


3/29/2023:


Doing well


Working hard with therapy


Robles catheter will be maintained and Urology f/u will be arranged


No pain reported








3/28/2023:


Doing well


Improved transfers


No pain reported


Robles cath is working well








3/27/2023:


Much improved


Overall having no new issues


Robles catheter is working very well








3/26/2023:


No major issues


Changed catheter and now not leaking and not causing spasms


No other issues


Labs will be checked tomorrow








3/25/2023:


Improved today


Adjusted robles catheter


Pain controlled


Eating well








3/24/2023:


Doing well


 at bedside


No neuro deficits last night


Pain comes and goes with chronic neuropathy


HLIVF








3/23/2023:


Improved status


No pain reported


Had an episode last evening of confusion and vision changes but vitals remained 

stable


Eating some but fluids minimal and UOP decreased so will initiate IVF gently








3/22/2023:


Much improved status 


Robles cath still in place


Urecholine maintained


Infection risk with in-dwelling cath


Very debilitated so unsure she could perform in/out caths at this time








3/21/2023:


No major events


Pain controlled


Dr Walker called me to update me on the MRI findings


No otseo noted under the wound








3/20/2023:


No major events


Improved transfers


Catheter removed but later could not void so replaced robles


Sugars monitored


Dr Walker consulted for wound








3/19/2023:


Much improved


Resting today


Family at bedside


No pain reported


Will DC catheter tomorrow








3/18/2023:


No major issues


DC tube feeding to prevent overfeeding


Dysarthria improved


Reviewed meds








3/17/2023:


Much improved


Participation is good


No falls


Pain is chronic neuropathy


Reinserted Robles catheter due to retention last night








3/16/2023:


Improved dramatically


Stood for 35 seconds with parallel bars


No pain reported except wound


Family at bedside








3/15/2023:


Improved overall


Family at bedside


Wound vac on hold due to bacteria in the wound culture


Dr Zamorano managing the IV abx 








3/14/2023:


Doing much better


Reviewed back history on her pre-existing debility:


10/5/22 gastric ulcer perforation but used wheelchair periodically prior to that

due to neuropathy


11/4/22 Decubitus ulcer admit stage III wound vac placed


1/29/23 used walker but wheelchair for long distances


Speech will see her today





Review of Systems


General:  Fatigue, Malaise


Neurological:  Weakness, Incoordination





Objective


Exam


Vital Signs





Vital Signs








  Date Time  Temp Pulse Resp B/P (MAP) Pulse Ox O2 Delivery O2 Flow Rate FiO2


 


3/29/23 21:30     95 Room Air  


 


3/29/23 19:12 36.2 91 22 117/56 (76)    


 


3/26/23 08:00       0.00 





Capillary Refill :


General Appearance:  No Apparent Distress, WD/WN, Anxious, Chronically ill


HEENT:  PERRL/EOMI, Normal ENT Inspection, Pharynx Normal


Neck:  Full Range of Motion, Normal Inspection, Non Tender, Supple, Carotid 

Bruit


Respiratory:  Chest Non Tender, Lungs Clear, Normal Breath Sounds, No Accessory 

Muscle Use, No Respiratory Distress


Cardiovascular:  Regular Rate, Rhythm, No Edema, No Gallop, No JVD, No Murmur, 

Normal Peripheral Pulses


Gastrointestinal:  Normal Bowel Sounds, No Organomegaly, No Pulsatile Mass, Non 

Tender, Soft


Back:  Normal Inspection, No CVA Tenderness, No Vertebral Tenderness


Extremity:  Normal Capillary Refill, Normal Inspection, Normal Range of Motion, 

Non Tender, No Calf Tenderness, No Pedal Edema


Neurologic/Psychiatric:  Alert, Oriented x3, CNs II-XII Norm as Tested, Abnormal

CNs II-XII, Depressed Affect, Facial Droop, Motor Weakness


Skin:  Normal Color, Warm/Dry


Lymphatic:  No Adenopathy





Results/Procedures


Lab


Patient resulted labs reviewed.





FIM


Transfers


Therapy Code Descriptions/Definitions 





Functional [place] Measure:


0=Not Assessed/NA        4=Minimal Assistance


1=Total Assistance        5=Supervision or Setup


2=Maximal Assistance  6=Modified [place]


3=Moderate Assistance 7=Complete IndependenceSCALE: Activities may be completed 

with or without assistive devices.





6-Indepedent-patient completes the activity by him/herself with no assistance 

from a helper.


5-Set-up or Clean-up Assistance-helper sets up or cleans up; patient completes 

activity. [place] assists only prior to or  


    following the activity.


4-Supervision or Touching Assistance-helper provides verbal cues and/or 

touching/steadying and/or contact guard assistance as patient completes activity

. Assistance may be provided   


    throughout the activity or intermittently.


3-Partial/Moderate Assistance-helper does LESS THAN HALF the effort. [place] 

lifts, holds or supports trunk or limbs, but provides less than half the effort.


2-Substantial/Maximal Assistance-helper does MORE THAN HALF the effort. [place] 

lifts or holds trunk or limbs and provides more than half the effort.


2-Qbnuytzxa-qrccxc does ALL the effort. Patient does none of the effort to 

complete the activity. Or, the assistance of 2 or more helpers is required for 

the patient to complete the  


    activity.


If activity was not attempted, code reason:


7-Patient Refused.


9-Not Applicable-not attempted and the patient did not perform the activity 

before the current illness, exacerbation or injury.


10-Not Attempted due to Environmental Limitations-(lack of equipment, weather 

restraints, etc.).


88-Not Attempted due to Medical Conditions or Safety Concerns.


Roll Left to Right (QC):  2


Sit to Lying (QC):  3


Sit to Stand (QC):  1


Chair/Bed-to-Chair Xfer(QC):  1


Car Transfer (QC):  88





Gait Training


Does the Patient Walk?:  No and Walking Goal IS indicated


Walk 10 feet (QC):  88


Walk 50 ft with 2 Turns(QC):  88


Walk 150 ft (QC):  88


Walking 10ft/uneven surface-QC:  88





Wheelchair Training


Does the Pt Use a Wheelchair?:  Yes


Wheel 50 ft with 2 turns (QC):  3


Wheel 150 ft (QC):  4


Type of Wheelchair:  Manual





Stair Training


1 Step (curb) (QC):  88


4 Steps (QC):  88


12 Steps (QC):  88





Balance


Picking up an Object (QC):  88





ADL-Treatment


Eating (QC):  6


Oral Hygiene (QC):  6


Shower/Bathe Self (QC):  3


Upper Body Dressing (QC):  5


Lower Body Dressing (QC):  1


On/Off Footwear (QC):  2


Toileting Hygiene (QC):  1


Toilet Transfer (QC):  1





Assessment/Plan


Assessment and Plan


Assess & Plan/Chief Complaint


Assessment:


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


Infective Endocarditis of Atrial Valve


Dysarthria/expressive aphasia


Dysphagia with PEG tube


Right Knee Effusion 


Sacral Decubitus Ulcer with MRSA so started Vanc


Anemia-iron def so ordered Venofer


T2DM - ID


Hypothyroidism


Constipation


Hyperlipidemia


GERD


Chronic debility: (10/5/22 gastric ulcer perforation but used wheelchair 

periodically prior to that due to neuropathy, 11/4/22 Decubitus ulcer admit 

stage III wound vac placed, 1/29/23 used walker but wheelchair for long 

distances)


Urinary retention attempted to DC catheter 3/16/23 and required replacement of 

cath 3/17/23





Plan:


Monitor closely


Change Synthroid to PO


ST consult for dysphagia


Utilize PEG


PT OT





3/14/2023:


Monitor closely


Speech therapy to advanced diet if able





3/15/2023:


Monitor closely


Wound care





3/16/2023:


Dramatically improved


Wound care


IV abx





3/17/2023:


Urecholine


Monitor closely





3/18/2023:


DC tube feeding





3/19/2023:


DC catheter tomorrow








3/20/2023:


Wound care


Robles cath replaced





3/21/2023:


Monitor pain


IV abx





3/22/2023:


Maintain cath


Monitor closely





3/23/2023:


IVF for dehydration





3/24/2023:


HLIVF





3/25/2023:


Adjusted catheter


Azo





3/26/2023:


Monitor closely


Fall risk





3/27/2023:


Change Pyridium to prn





3/28/2023:


Monitoring closely





3/29/2023:


Monitor closely


Maintain robles I suspect neurogenic bladder





(1) CVA (cerebral vascular accident)











COOKIE CHRISTIE DO                Mar 29, 2023 06:54

## 2023-03-29 NOTE — PHYSICAL THERAPY DAILY NOTE
PT Daily Note-Current


Subjective


Pt found lying in bed upon entry. Agreed to PT. States that she is still having 

some L hip and knee pain but she is doing well.





Pain


Section J - Health Conditions


1. Rarely or not at all


2. Occasionally


3. Frequently


4. Almost constantly


8. Unable to answer


Pain Effect on Sleep:  1


Pain Interference with Therapy:  2


Pain Interference w/Day-to-Day:  2





Mental Status


Patient Orientation:  Person


Attachments:  Drains, [place] Catheter





Transfers


SCALE: Activities may be completed with or without assistive devices.





6-Indepedent-patient completes the activity by him/herself with no assistance 

from a helper.


5-Set-up or Clean-up Assistance-helper sets up or cleans up; patient completes 

activity. [place] assists only prior to or  


    following the activity.


4-Supervision or Touching Assistance-helper provides verbal cues and/or 

touching/steadying and/or contact guard assistance as patient completes 

activity. Assistance may be provided   


    throughout the activity or intermittently.


3-Partial/Moderate Assistance-helper does LESS THAN HALF the effort. [place] 

lifts, holds or supports trunk or limbs, but provides less than half the effort.


2-Substantial/Maximal Assistance-helper does MORE THAN HALF the effort. [place] 

lifts or holds trunk or limbs and provides more than half the effort.


9-Logqxzqik-nmbinw does ALL the effort. Patient does none of the effort to 

complete the activity. Or, the assistance of 2 or more helpers is required for 

the patient to complete the  


    activity.


If activity was not attempted, code reason:


7-Patient Refused.


9-Not Applicable-not attempted and the patient did not perform the activity 

before the current illness, exacerbation or injury.


10-Not Attempted due to Environmental Limitations-(lack of equipment, weather 

restraints, etc.).


88-Not Attempted due to Medical Conditions or Safety Concerns.





Weight Bearing


Weight Bearing/Tolerated


Weight Bearing/Tolerated





Gait Training


Does the Patient Walk?:  No and Walking Goal IS indicated





Treatments


Supine exercise AAROM: SLRs, SAQs, quad sets, ankle pumps, glute sets, heel 

slides x 20 each





Assessment


Current Status:  Fair Progress


Pt demonstrates limited muscle endurance and strength with therapeutic 

exercises. Required LLE assistance to complete full ROM. Continue to progress pt

as tolerated per POC to increase strength, endurance, and functional ability.





PT Long Term Goals


Long Term Goals


PT Long Term Goals Time Frame:  May 8, 2023


Roll Left & Right (QC):  4


Sit to Lying (QC):  4


Lying-Sitting on Side/Bed(QC):  4


Sit to Stand (QC):  2


Chair/Bed-to-Chair Xfer(QC):  2


Toilet Transfer (QC):  2


Car Transfer (QC):  2


Does the Patient Walk:  No and Walking Goal NOT indicated


Walk 10 feet (QC):  1


Walk 50ft with 2 Turns (QC):  88


Walk 150 ft (QC):  88


Walking 10ft on Uneven Surface:  88


1 Step (curb) (QC):  88


4 Steps (QC):  88


12 Steps (QC):  88


Picking up an Object (QC):  88


Does the Pt use WC or Scooter?:  Yes


Wheel 50 feet with 2 turns (QC:  5 (Patient able to use (L) LE and (B) LE's for 

w/c propulsion)


Type:  Manual


Wheel 150 feet:  5


Type:  Manual





PT Plan


Treatment/Plan


Treatment Plan:  Continue Plan of Care


Treatment Plan:  Bed Mobility, Concurrent Therapy, Education, Functional 

Activity Russ, Functional Strength, Group Therapy, Safety, Therapeutic 

Exercise, Transfers, Other (W/C mobility)


Treatment Duration:  May 8, 2023


Frequency:  At least 5 of 7 days/Wk (IRF)


Estimated Hrs Per Day:  1.5 hours per day


Patient and/or Family Agrees t:  Yes





Time


Time In:  1115


Time Out:  1130


DATE:  Mar 29, 2023


Total Billed Treatment Time:  15


Total Billed Treatment


1 visit


EX x 1











PADDY SINGH PTA                Mar 29, 2023 13:30

## 2023-03-30 VITALS — DIASTOLIC BLOOD PRESSURE: 54 MMHG | SYSTOLIC BLOOD PRESSURE: 121 MMHG

## 2023-03-30 VITALS — DIASTOLIC BLOOD PRESSURE: 48 MMHG | SYSTOLIC BLOOD PRESSURE: 115 MMHG

## 2023-03-30 RX ADMIN — BETHANECHOL CHLORIDE SCH MG: 25 TABLET ORAL at 20:55

## 2023-03-30 RX ADMIN — Medication SCH EACH: at 08:00

## 2023-03-30 RX ADMIN — AMITRIPTYLINE HYDROCHLORIDE SCH MG: 25 TABLET, FILM COATED ORAL at 20:56

## 2023-03-30 RX ADMIN — ENOXAPARIN SODIUM SCH MG: 100 INJECTION SUBCUTANEOUS at 16:59

## 2023-03-30 RX ADMIN — PREGABALIN SCH MG: 75 CAPSULE ORAL at 20:56

## 2023-03-30 RX ADMIN — NYSTATIN SCH GM: 100000 CREAM TOPICAL at 08:24

## 2023-03-30 RX ADMIN — Medication SCH ML: at 21:02

## 2023-03-30 RX ADMIN — DOCUSATE SODIUM AND SENNOSIDES SCH EA: 8.6; 5 TABLET, FILM COATED ORAL at 20:57

## 2023-03-30 RX ADMIN — BETHANECHOL CHLORIDE SCH MG: 25 TABLET ORAL at 11:05

## 2023-03-30 RX ADMIN — POLYETHYLENE GLYCOL (3350) SCH GM: 17 POWDER, FOR SOLUTION ORAL at 08:24

## 2023-03-30 RX ADMIN — FAMOTIDINE SCH MG: 20 TABLET, FILM COATED ORAL at 20:57

## 2023-03-30 RX ADMIN — SUCRALFATE SCH GM: 1 TABLET ORAL at 16:59

## 2023-03-30 RX ADMIN — MELATONIN 3 MG ORAL TABLET SCH MG: 3 TABLET ORAL at 20:58

## 2023-03-30 RX ADMIN — Medication SCH EACH: at 20:59

## 2023-03-30 RX ADMIN — INSULIN ASPART SCH UNIT: 100 INJECTION, SOLUTION INTRAVENOUS; SUBCUTANEOUS at 11:05

## 2023-03-30 RX ADMIN — Medication SCH MCG: at 06:55

## 2023-03-30 RX ADMIN — POTASSIUM CHLORIDE SCH MEQ: 750 TABLET, FILM COATED, EXTENDED RELEASE ORAL at 17:01

## 2023-03-30 RX ADMIN — INSULIN ASPART SCH UNIT: 100 INJECTION, SOLUTION INTRAVENOUS; SUBCUTANEOUS at 15:36

## 2023-03-30 RX ADMIN — LEVOTHYROXINE SODIUM SCH MCG: 100 TABLET ORAL at 05:54

## 2023-03-30 RX ADMIN — PREGABALIN SCH MG: 75 CAPSULE ORAL at 08:00

## 2023-03-30 RX ADMIN — METFORMIN HYDROCHLORIDE SCH MG: 500 TABLET, FILM COATED ORAL at 06:55

## 2023-03-30 RX ADMIN — DOCUSATE SODIUM SCH MG: 100 CAPSULE ORAL at 08:24

## 2023-03-30 RX ADMIN — Medication SCH ML: at 05:57

## 2023-03-30 RX ADMIN — BETHANECHOL CHLORIDE SCH MG: 25 TABLET ORAL at 16:58

## 2023-03-30 RX ADMIN — NYSTATIN SCH GM: 100000 CREAM TOPICAL at 21:01

## 2023-03-30 RX ADMIN — NYSTATIN SCH GM: 100000 CREAM TOPICAL at 13:33

## 2023-03-30 RX ADMIN — VANCOMYCIN HYDROCHLORIDE SCH MLS/HR: 500 INJECTION, POWDER, LYOPHILIZED, FOR SOLUTION INTRAVENOUS at 13:33

## 2023-03-30 RX ADMIN — MICONAZOLE NITRATE SCH APPLIC: 20 POWDER TOPICAL at 21:01

## 2023-03-30 RX ADMIN — FOLIC ACID SCH MG: 1 TABLET ORAL at 08:00

## 2023-03-30 RX ADMIN — SUCRALFATE SCH GM: 1 TABLET ORAL at 20:59

## 2023-03-30 RX ADMIN — METFORMIN HYDROCHLORIDE SCH MG: 500 TABLET, FILM COATED ORAL at 17:01

## 2023-03-30 RX ADMIN — Medication SCH ML: at 13:34

## 2023-03-30 RX ADMIN — INSULIN ASPART SCH UNIT: 100 INJECTION, SOLUTION INTRAVENOUS; SUBCUTANEOUS at 05:57

## 2023-03-30 RX ADMIN — SUCRALFATE SCH GM: 1 TABLET ORAL at 05:54

## 2023-03-30 RX ADMIN — LIDOCAINE SCH EA: 50 PATCH CUTANEOUS at 08:00

## 2023-03-30 RX ADMIN — BETHANECHOL CHLORIDE SCH MG: 25 TABLET ORAL at 05:54

## 2023-03-30 RX ADMIN — SUCRALFATE SCH GM: 1 TABLET ORAL at 11:05

## 2023-03-30 RX ADMIN — MICONAZOLE NITRATE SCH APPLIC: 20 POWDER TOPICAL at 08:00

## 2023-03-30 RX ADMIN — POTASSIUM CHLORIDE SCH MEQ: 750 TABLET, FILM COATED, EXTENDED RELEASE ORAL at 07:59

## 2023-03-30 RX ADMIN — DOCUSATE SODIUM AND SENNOSIDES SCH EA: 8.6; 5 TABLET, FILM COATED ORAL at 08:24

## 2023-03-30 RX ADMIN — DOCUSATE SODIUM SCH MG: 100 CAPSULE ORAL at 20:55

## 2023-03-30 RX ADMIN — FAMOTIDINE SCH MG: 20 TABLET, FILM COATED ORAL at 08:00

## 2023-03-30 RX ADMIN — INSULIN ASPART SCH UNIT: 100 INJECTION, SOLUTION INTRAVENOUS; SUBCUTANEOUS at 20:59

## 2023-03-30 RX ADMIN — POLYETHYLENE GLYCOL (3350) SCH GM: 17 POWDER, FOR SOLUTION ORAL at 21:03

## 2023-03-30 NOTE — PHYSICAL THERAPY DAILY NOTE
PT Daily Note-Current


Subjective


No c/o pain.  Patient pleasant and agreeable to work with therapy.





Pain


Section J - Health Conditions


1. Rarely or not at all


2. Occasionally


3. Frequently


4. Almost constantly


8. Unable to answer


Pain Effect on Sleep:  1


Pain Interference with Therapy:  2


Pain Interference w/Day-to-Day:  2





Appearance


Patient up in w/c with waffle cushion underneath.  Has been up in w/c since 7am.





Transfers


SCALE: Activities may be completed with or without assistive devices.





6-Indepedent-patient completes the activity by him/herself with no assistance 

from a helper.


5-Set-up or Clean-up Assistance-helper sets up or cleans up; patient completes 

activity. [place] assists only prior to or  


    following the activity.


4-Supervision or Touching Assistance-helper provides verbal cues and/or 

touching/steadying and/or contact guard assistance as patient completes 

activity. Assistance may be provided   


    throughout the activity or intermittently.


3-Partial/Moderate Assistance-helper does LESS THAN HALF the effort. [place] 

lifts, holds or supports trunk or limbs, but provides less than half the effort.


2-Substantial/Maximal Assistance-helper does MORE THAN HALF the effort. [place] 

lifts or holds trunk or limbs and provides more than half the effort.


0-Dvwijtmgk-ccbzcu does ALL the effort. Patient does none of the effort to 

complete the activity. Or, the assistance of 2 or more helpers is required for 

the patient to complete the  


    activity.


If activity was not attempted, code reason:


7-Patient Refused.


9-Not Applicable-not attempted and the patient did not perform the activity bef

ore the current illness, exacerbation or injury.


10-Not Attempted due to Environmental Limitations-(lack of equipment, weather r

estraints, etc.).


88-Not Attempted due to Medical Conditions or Safety Concerns.


Roll Left & Right (QC):  6 (can roll (R)/(L) (I) with bed rail, but requires max

(A) to scoot hips (R)/(L) in bed prior to rolling.)


Sit to Lying (QC):  3 (mod (A) to lift LE's onto mattress)


Sit to Stand (QC):  1 (Attempted in // bars x 4 attempts - unable to come to 

upright position x 3 with max (A) of 1, unable to come to standing x 1 with max 

(A) of 2 for ~ 10 seconds.  )


Chair/Bed-to-Chair Xfer(QC):  1 (sit>stand lift)





Weight Bearing


Weight Bearing/Tolerated


Weight Bearing/Tolerated





Wheelchair Training


Does the Pt Use a Wheelchair?:  Yes


Wheel 50 ft with 2 turns (QC):  5 (Patient propelled from // bars in gym into 

her room and around foot of bed to opposite side of room SBA only with prn cues 

(2-3) to use feet more for for avoiding obstacles)


Type of Wheelchair:  Manual


Patient able to lock/unlock (B) brakes using (L) UE once ALLEN placed brake 

extender on (R) side of w/c.





Exercises


(B) LE exercise with theraband in sitting:


Hip extension x 15 AROM


Knee flexion x 15 AROM


Hip abduction x 15 AROM


PF x 15 AROM(with leg supported on P.T.'s knee)





(B) LE exercise in sitting:


LAQ to target x 15, extension lag (R), but all AROM


Hip flexion to target AROM x 10 (limited elevation (B))


DF x 10 with endrange assist 


Hip adduction ball squeeze x 10





Assessment


Current Status:  Fair Progress


Improvements noted with W/C propulsion this date.  Sit>stand lift is still most 

appropriate mode for sit<>stand and transfers for patient and caregiver safety.





PT Long Term Goals


Long Term Goals


PT Long Term Goals Time Frame:  May 8, 2023


Roll Left & Right (QC):  4


Sit to Lying (QC):  4


Lying-Sitting on Side/Bed(QC):  4


Sit to Stand (QC):  2


Chair/Bed-to-Chair Xfer(QC):  2


Toilet Transfer (QC):  2


Car Transfer (QC):  2


Does the Patient Walk:  No and Walking Goal NOT indicated


Walk 10 feet (QC):  1


Walk 50ft with 2 Turns (QC):  88


Walk 150 ft (QC):  88


Walking 10ft on Uneven Surface:  88


1 Step (curb) (QC):  88


4 Steps (QC):  88


12 Steps (QC):  88


Picking up an Object (QC):  88


Does the Pt use WC or Scooter?:  Yes


Wheel 50 feet with 2 turns (QC:  5 (Patient able to use (L) LE and (B) LE's for 

w/c propulsion)


Type:  Manual


Wheel 150 feet:  5


Type:  Manual





PT Plan


Treatment/Plan


Treatment Plan:  Continue Plan of Care


Treatment Plan:  Bed Mobility, Concurrent Therapy, Education, Functional 

Activity Russ, Functional Strength, Group Therapy, Safety, Therapeutic 

Exercise, Transfers, Other (W/C mobility)


Treatment Duration:  May 8, 2023


Frequency:  At least 5 of 7 days/Wk (IRF)


Estimated Hrs Per Day:  1.5 hours per day


Patient and/or Family Agrees t:  Yes





Time


Time In:  905


Time Out:  1005


DATE:  Mar 30, 2023


Total Billed Treatment Time:  60


Total Billed Treatment


FA 25 (co-treatment due to patient acuity and required assist of 2 skilled 

therapists for patient safety and to address multiple patient deficits.  


Ex 20,  W/C 15 (individual minutes)


= 60 total











Laurel Heredia PT               Mar 30, 2023 12:05

## 2023-03-30 NOTE — PHYSICAL THERAPY DAILY NOTE
PT Daily Note-Current


Subjective


Patient ready to return to bed - bottom "a little sore".





Pain


Section J - Health Conditions


1. Rarely or not at all


2. Occasionally


3. Frequently


4. Almost constantly


8. Unable to answer


Pain Effect on Sleep:  1


Pain Interference with Therapy:  2


Pain Interference w/Day-to-Day:  2





Transfers


SCALE: Activities may be completed with or without assistive devices.





6-Indepedent-patient completes the activity by him/herself with no assistance 

from a helper.


5-Set-up or Clean-up Assistance-helper sets up or cleans up; patient completes 

activity. [place] assists only prior to or  


    following the activity.


4-Supervision or Touching Assistance-helper provides verbal cues and/or 

touching/steadying and/or contact guard assistance as patient completes 

activity. Assistance may be provided   


    throughout the activity or intermittently.


3-Partial/Moderate Assistance-helper does LESS THAN HALF the effort. [place] 

lifts, holds or supports trunk or limbs, but provides less than half the effort.


2-Substantial/Maximal Assistance-helper does MORE THAN HALF the effort. [place] 

lifts or holds trunk or limbs and provides more than half the effort.


4-Gshldeqcw-ajjifd does ALL the effort. Patient does none of the effort to compl

ete the activity. Or, the assistance of 2 or more helpers is required for the 

patient to complete the  


    activity.


If activity was not attempted, code reason:


7-Patient Refused.


9-Not Applicable-not attempted and the patient did not perform the activity 

before the current illness, exacerbation or injury.


10-Not Attempted due to Environmental Limitations-(lack of equipment, weather 

restraints, etc.).


88-Not Attempted due to Medical Conditions or Safety Concerns.


Roll Left & Right (QC):  6 (using bed rail, but requires max (A) to position 

hips/scoot (R)/(L) prior to rolling)


Lying to Sitting/Side of Bed(Q:  3 (mod (A) to lift legs to bed)


Chair/Bed-to-Chair Xfer(QC):  1 (using sit>stand lift.)





Weight Bearing


Weight Bearing/Tolerated


Weight Bearing/Tolerated





Assessment


Current Status:  Fair Progress


Will continue to require sit>stand lift for transfer safety at home /p D/C.





PT Long Term Goals


Long Term Goals


PT Long Term Goals Time Frame:  May 8, 2023


Roll Left & Right (QC):  4


Sit to Lying (QC):  4


Lying-Sitting on Side/Bed(QC):  4


Sit to Stand (QC):  2


Chair/Bed-to-Chair Xfer(QC):  2


Toilet Transfer (QC):  2


Car Transfer (QC):  2


Does the Patient Walk:  No and Walking Goal NOT indicated


Walk 10 feet (QC):  1


Walk 50ft with 2 Turns (QC):  88


Walk 150 ft (QC):  88


Walking 10ft on Uneven Surface:  88


1 Step (curb) (QC):  88


4 Steps (QC):  88


12 Steps (QC):  88


Picking up an Object (QC):  88


Does the Pt use WC or Scooter?:  Yes


Wheel 50 feet with 2 turns (QC:  5 (Patient able to use (L) LE and (B) LE's for 

w/c propulsion)


Type:  Manual


Wheel 150 feet:  5


Type:  Manual





PT Plan


Treatment/Plan


Treatment Plan:  Continue Plan of Care


Treatment Plan:  Bed Mobility, Concurrent Therapy, Education, Functional 

Activity Russ, Functional Strength, Group Therapy, Safety, Therapeutic 

Exercise, Transfers, Other (W/C mobility)


Treatment Duration:  May 8, 2023


Frequency:  At least 5 of 7 days/Wk (IRF)


Estimated Hrs Per Day:  1.5 hours per day


Patient and/or Family Agrees t:  Yes





Time


Time In:  1120


Time Out:  1135


DATE:  Mar 30, 2023


Total Billed Treatment Time:  15


Total Billed Treatment


15 FA











Laurel Heredia PT               Mar 30, 2023 12:16

## 2023-03-30 NOTE — OCCUPATIONAL THER DAILY NOTE
OT Current Status-Daily Note


Subjective


Pt alert, sitting in w/c.  Co-treat with PT([id number]), skills of 2 clinicians 

required to decrease fall risk, increase functional standing and motor planning 

for functional tasks.  Pt focusing on standing and B LE strengthening while OT 

focusing on R UE placement during mobility and assisting with standing during 

//bars exercises.





Mental Status/Objective


Patient Orientation:  Person, Place, Time, Situation


Attachments:  Drains (wound vac), [place] Catheter, IV





ADL-Treatment


Therapy Code Descriptions/Definitions 





Functional [place] Measure:


0=Not Assessed/NA        4=Minimal Assistance


1=Total Assistance        5=Supervision or Setup


2=Maximal Assistance  6=Modified [place]


3=Moderate Assistance 7=Complete IndependenceSCALE: Activities may be completed 

with or without assistive devices.





6-Indepedent-patient completes the activity by him/herself with no assistance 

from a helper.


5-Set-up or Clean-up Assistance-helper sets up or cleans up; patient completes 

activity. [place] assists only prior to or  


    following the activity.


4-Supervision or Touching Assistance-helper provides verbal cues and/or 

touching/steadying and/or contact guard assistance as patient completes 

activity. Assistance may be provided   


    throughout the activity or intermittently.


3-Partial/Moderate Assistance-helper does LESS THAN HALF the effort. [place] 

lifts, holds or supports trunk or limbs, but provides less than half the effort.


2-Substantial/Maximal Assistance-helper does MORE THAN HALF the effort. [place] 

lifts or holds trunk or limbs and provides more than half the effort.


2-Yatpmmnik-eapvcx does ALL the effort. Patient does none of the effort to 

complete the activity. Or, the assistance of 2 or more helpers is required for 

the patient to complete the  


    activity.


If activity was not attempted, code reason:


7-Patient Refused.


9-Not Applicable-not attempted and the patient did not perform the activity 

before the current illness, exacerbation or injury.


10-Not Attempted due to Environmental Limitations-(lack of equipment, weather 

restraints, etc.).


88-Not Attempted due to Medical Conditions or Safety Concerns.





Other Treatment


See PT notes for standing progress.  Pt required max A x2 for sit to stand and 

to maintain standing.  Pt has difficulty with motor planning multiple movements 

at once to work on standing.  Pt does demonstrate active movement throughout R 

UE and is able to use as a functional assist with B UE.  After session, pt left 

in care of PT.  All needs met.





OT Short Term Goals


Short Term Goals


Time Frame:  Mar 28, 2023


Eating:  3


Toileting hygiene:  3


Shower/bathe self:  3


Lower body dressing:  3


Putting on/taking off footwear:  3





OT Long Term Goals


Long Term Goals


Time Frame:  2023


Acute change in mental status:  1


Inattention:  0


Disorganized thinkin


Altered level of consciousness:  0


Eating (QC):  5


Oral Hygiene (QC):  5


Toileting Hygiene (QC):  4


Shower/Bathe Self (QC):  4


Upper Body Dressing (QC):  5


Lower Body Dressing (QC):  4


On/Off Footwear (QC):  4


Additional Goals:  1-Demonstrate ADL Tasks, 2-Verbalize Understanding, 3-

ImproveStrength/Russ


1=Demonstrate adherence to instructed precautions during ADL tasks.


2=Patient will verbalize/demonstrate understanding of assistive 

devices/modifications for ADL.


3=Patient will improve strength/tolerance for activity to enable patient to 

perform ADL's.





OT Education/Plan


Problem List/Assessment


Assessment:  Decreased Activ Tolerance, Dependent Transfers, Impaired Self-Care 

Skills





Discharge Recommendations


Plan/Recommendations:  Continue POC





Treatment Plan/Plan of Care


Patient would benefit from OT for education, treatment and training to promote 

independence in ADL's, mobility, safety and/or upper extremity function for 

ADL's.


Plan of Care:  ADL Retraining, Functional Mobility, Group Exercise/Act as Ind, 

UE Funct Exercise/Act, UE Neuromus Re-Ed/Coord, Visual/Perceptual Retrain, W/C 

Management Training


Treatment Duration:  2023


Frequency:  At least 5 of 7 days/Wk (IRF)


Estimated Hrs Per Day:  1.5 hours per day


Agreement:  Yes


Rehab Potential:  Guarded





Time


Start Time:  09:05


Stop Time:  09:30


DATE:  Mar 30, 2023


Total Time Billed (hr/min):  25


Billed Treatment Time


1 visit-NM 2 (25 min)  co-treat with PT 25 min











ARTIE MC               Mar 30, 2023 11:59

## 2023-03-30 NOTE — OCCUPATIONAL THER DAILY NOTE
OT Current Status-Daily Note


Subjective


Pt alert, lying in bed.  Pt agrees to therapy.  Pt c/o pain (buttocks, B knees),

no rating.





Mental Status/Objective


Patient Orientation:  Person, Place, Time, Situation


Attachments:  Drains (wound vac), [place] Catheter, IV





ADL-Treatment


Pt agrees to sit up for breakfast.  Mod A for supine to EOB.  Sit to stand lift 

for transfers.  Pt sat up in w/c to eat breakfast.  Pt able to open 

containers/packages to set up meal then eats with regular utensils.  After 

session, pt sitting in w/c with call light/phone in reach.  All needs met.  Nrsg

aware of pt's position.


Therapy Code Descriptions/Definitions 





Functional [place] Measure:


0=Not Assessed/NA        4=Minimal Assistance


1=Total Assistance        5=Supervision or Setup


2=Maximal Assistance  6=Modified [place]


3=Moderate Assistance 7=Complete IndependenceSCALE: Activities may be completed 

with or without assistive devices.





6-Indepedent-patient completes the activity by him/herself with no assistance 

from a helper.


5-Set-up or Clean-up Assistance-helper sets up or cleans up; patient completes 

activity. [place] assists only prior to or  


    following the activity.


4-Supervision or Touching Assistance-helper provides verbal cues and/or 

touching/steadying and/or contact guard assistance as patient completes 

activity. Assistance may be provided   


    throughout the activity or intermittently.


3-Partial/Moderate Assistance-helper does LESS THAN HALF the effort. [place] 

lifts, holds or supports trunk or limbs, but provides less than half the effort.


2-Substantial/Maximal Assistance-helper does MORE THAN HALF the effort. [place] 

lifts or holds trunk or limbs and provides more than half the effort.


7-Ncsnvqnus-llqkmh does ALL the effort. Patient does none of the effort to 

complete the activity. Or, the assistance of 2 or more helpers is required for 

the patient to complete the  


    activity.


If activity was not attempted, code reason:


7-Patient Refused.


9-Not Applicable-not attempted and the patient did not perform the activity 

before the current illness, exacerbation or injury.


10-Not Attempted due to Environmental Limitations-(lack of equipment, weather 

restraints, etc.).


88-Not Attempted due to Medical Conditions or Safety Concerns.


Eating (QC):  6





OT Short Term Goals


Short Term Goals


Time Frame:  Mar 28, 2023


Eating:  3


Toileting hygiene:  3


Shower/bathe self:  3


Lower body dressing:  3


Putting on/taking off footwear:  3





OT Long Term Goals


Long Term Goals


Time Frame:  2023


Acute change in mental status:  1


Inattention:  0


Disorganized thinkin


Altered level of consciousness:  0


Eating (QC):  5


Oral Hygiene (QC):  5


Toileting Hygiene (QC):  4


Shower/Bathe Self (QC):  4


Upper Body Dressing (QC):  5


Lower Body Dressing (QC):  4


On/Off Footwear (QC):  4


Additional Goals:  1-Demonstrate ADL Tasks, 2-Verbalize Understanding, 3-

ImproveStrength/[place]


1=Demonstrate adherence to instructed precautions during ADL tasks.


2=Patient will verbalize/demonstrate understanding of assistive 

devices/modifications for ADL.


3=Patient will improve strength/tolerance for activity to enable patient to 

perform ADL's.





OT Education/Plan


Problem List/Assessment


Assessment:  Decreased Activ Tolerance, Decreased UE Strength, Dependent 

Transfers, Impaired Bed Mobility, Impaired Self-Care Skills, Restricted Funct UE

ROM





Discharge Recommendations


Plan/Recommendations:  Continue POC





Treatment Plan/Plan of Care


Patient would benefit from OT for education, treatment and training to promote 

independence in ADL's, mobility, safety and/or upper extremity function for 

ADL's.


Plan of Care:  ADL Retraining, Functional Mobility, Group Exercise/Act as Ind, 

UE Funct Exercise/Act, UE Neuromus Re-Ed/Coord, Visual/Perceptual Retrain, W/C 

Management Training


Treatment Duration:  2023


Frequency:  At least 5 of 7 days/Wk (IRF)


Estimated Hrs Per Day:  1.5 hours per day


Agreement:  Yes


Rehab Potential:  Guarded





Time


Start Time:  07:00


Stop Time:  08:00


DATE:  Mar 30, 2023


Total Time Billed (hr/min):  60


Billed Treatment Time


1 visit-ADL 3 (45 min)  FA 1 (15 min)











ARTIE MC               Mar 30, 2023 07:33

## 2023-03-30 NOTE — PM&R PROGRESS NOTE
Subjective


HPI/CC On Admission


Date Seen by Provider:  Mar 30, 2023


Time Seen by Provider:  11:00


Subjective/Events-last exam


3/30/2023:


Doing well


No pain reported


Participation is good


DC next week








3/29/2023:


Doing well


Working hard with therapy


Robles catheter will be maintained and Urology f/u will be arranged


No pain reported








3/28/2023:


Doing well


Improved transfers


No pain reported


Robles cath is working well








3/27/2023:


Much improved


Overall having no new issues


Robles catheter is working very well








3/26/2023:


No major issues


Changed catheter and now not leaking and not causing spasms


No other issues


Labs will be checked tomorrow








3/25/2023:


Improved today


Adjusted robles catheter


Pain controlled


Eating well








3/24/2023:


Doing well


 at bedside


No neuro deficits last night


Pain comes and goes with chronic neuropathy


HLIVF








3/23/2023:


Improved status


No pain reported


Had an episode last evening of confusion and vision changes but vitals remained 

stable


Eating some but fluids minimal and UOP decreased so will initiate IVF gently








3/22/2023:


Much improved status 


Robles cath still in place


Urecholine maintained


Infection risk with in-dwelling cath


Very debilitated so unsure she could perform in/out caths at this time








3/21/2023:


No major events


Pain controlled


Dr Walker called me to update me on the MRI findings


No otseo noted under the wound








3/20/2023:


No major events


Improved transfers


Catheter removed but later could not void so replaced robles


Sugars monitored


Dr Walker consulted for wound








3/19/2023:


Much improved


Resting today


Family at bedside


No pain reported


Will DC catheter tomorrow








3/18/2023:


No major issues


DC tube feeding to prevent overfeeding


Dysarthria improved


Reviewed meds








3/17/2023:


Much improved


Participation is good


No falls


Pain is chronic neuropathy


Reinserted Robles catheter due to retention last night








3/16/2023:


Improved dramatically


Stood for 35 seconds with parallel bars


No pain reported except wound


Family at bedside








3/15/2023:


Improved overall


Family at bedside


Wound vac on hold due to bacteria in the wound culture


Dr Zamorano managing the IV abx 








3/14/2023:


Doing much better


Reviewed back history on her pre-existing debility:


10/5/22 gastric ulcer perforation but used wheelchair periodically prior to that

due to neuropathy


11/4/22 Decubitus ulcer admit stage III wound vac placed


1/29/23 used walker but wheelchair for long distances


Speech will see her today





Review of Systems


General:  Fatigue, Malaise





Objective


Exam


Vital Signs





Vital Signs








  Date Time  Temp Pulse Resp B/P (MAP) Pulse Ox O2 Delivery O2 Flow Rate FiO2


 


3/30/23 21:00      Room Air  


 


3/30/23 20:40 35.9 92 20 115/48 (70) 92   


 


3/26/23 08:00       0.00 





Capillary Refill :


General Appearance:  No Apparent Distress, WD/WN, Anxious, Chronically ill


HEENT:  PERRL/EOMI, Normal ENT Inspection, Pharynx Normal


Neck:  Full Range of Motion, Normal Inspection, Non Tender, Supple, Carotid 

Bruit


Respiratory:  Chest Non Tender, Lungs Clear, Normal Breath Sounds, No Accessory 

Muscle Use, No Respiratory Distress


Cardiovascular:  Regular Rate, Rhythm, No Edema, No Gallop, No JVD, No Murmur, 

Normal Peripheral Pulses


Gastrointestinal:  Normal Bowel Sounds, No Organomegaly, No Pulsatile Mass, Non 

Tender, Soft


Back:  Normal Inspection, No CVA Tenderness, No Vertebral Tenderness


Extremity:  Normal Capillary Refill, Normal Inspection, Normal Range of Motion, 

Non Tender, No Calf Tenderness, No Pedal Edema


Neurologic/Psychiatric:  Alert, Oriented x3, CNs II-XII Norm as Tested, Abnormal

CNs II-XII, Depressed Affect, Facial Droop, Motor Weakness


Skin:  Normal Color, Warm/Dry


Lymphatic:  No Adenopathy





Results/Procedures


Lab


Patient resulted labs reviewed.





FIM


Transfers


Therapy Code Descriptions/Definitions 





Functional [place] Measure:


0=Not Assessed/NA        4=Minimal Assistance


1=Total Assistance        5=Supervision or Setup


2=Maximal Assistance  6=Modified [place]


3=Moderate Assistance 7=Complete IndependenceSCALE: Activities may be completed 

with or without assistive devices.





6-Indepedent-patient completes the activity by him/herself with no assistance 

from a helper.


5-Set-up or Clean-up Assistance-helper sets up or cleans up; patient completes 

activity. [place] assists only prior to or  


    following the activity.


4-Supervision or Touching Assistance-helper provides verbal cues and/or 

touching/steadying and/or contact guard assistance as patient completes 

activity. Assistance may be provided   


    throughout the activity or intermittently.


3-Partial/Moderate Assistance-helper does LESS THAN HALF the effort. [place] 

lifts, holds or supports trunk or limbs, but provides less than half the effort.


2-Substantial/Maximal Assistance-helper does MORE THAN HALF the effort. [place] 

lifts or holds trunk or limbs and provides more than half the effort.


6-Usrebyhhb-amsitk does ALL the effort. Patient does none of the effort to 

complete the activity. Or, the assistance of 2 or more helpers is required for 

the patient to complete the  


    activity.


If activity was not attempted, code reason:


7-Patient Refused.


9-Not Applicable-not attempted and the patient did not perform the activity 

before the current illness, exacerbation or injury.


10-Not Attempted due to Environmental Limitations-(lack of equipment, weather 

restraints, etc.).


88-Not Attempted due to Medical Conditions or Safety Concerns.


Roll Left to Right (QC):  3


Sit to Lying (QC):  3


Sit to Stand (QC):  1


Chair/Bed-to-Chair Xfer(QC):  1


Car Transfer (QC):  88





Gait Training


Does the Patient Walk?:  No and Walking Goal IS indicated


Walk 10 feet (QC):  88


Walk 50 ft with 2 Turns(QC):  88


Walk 150 ft (QC):  88


Walking 10ft/uneven surface-QC:  88





Wheelchair Training


Does the Pt Use a Wheelchair?:  Yes


Wheel 50 ft with 2 turns (QC):  3


Wheel 150 ft (QC):  3


Type of Wheelchair:  Manual





Stair Training


1 Step (curb) (QC):  88


4 Steps (QC):  88


12 Steps (QC):  88





Balance


Picking up an Object (QC):  88





ADL-Treatment


Eating (QC):  6


Oral Hygiene (QC):  6


Shower/Bathe Self (QC):  3


Upper Body Dressing (QC):  5


Lower Body Dressing (QC):  1


On/Off Footwear (QC):  2


Toileting Hygiene (QC):  1


Toilet Transfer (QC):  1





Assessment/Plan


Assessment and Plan


Assess & Plan/Chief Complaint


Assessment:


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


Infective Endocarditis of Atrial Valve


Dysarthria/expressive aphasia


Dysphagia with PEG tube


Right Knee Effusion 


Sacral Decubitus Ulcer with MRSA so started Vanc


Anemia-iron def so ordered Venofer


T2DM - ID


Hypothyroidism


Constipation


Hyperlipidemia


GERD


Chronic debility: (10/5/22 gastric ulcer perforation but used wheelchair 

periodically prior to that due to neuropathy, 11/4/22 Decubitus ulcer admit 

stage III wound vac placed, 1/29/23 used walker but wheelchair for long 

distances)


Urinary retention attempted to DC catheter 3/16/23 and required replacement of 

cath 3/17/23





Plan:


Monitor closely


Change Synthroid to PO


ST consult for dysphagia


Utilize PEG


PT OT





3/14/2023:


Monitor closely


Speech therapy to advanced diet if able





3/15/2023:


Monitor closely


Wound care





3/16/2023:


Dramatically improved


Wound care


IV abx





3/17/2023:


Urecholine


Monitor closely





3/18/2023:


DC tube feeding





3/19/2023:


DC catheter tomorrow








3/20/2023:


Wound care


Robles cath replaced





3/21/2023:


Monitor pain


IV abx





3/22/2023:


Maintain cath


Monitor closely





3/23/2023:


IVF for dehydration





3/24/2023:


HLIVF





3/25/2023:


Adjusted catheter


Azo





3/26/2023:


Monitor closely


Fall risk





3/27/2023:


Change Pyridium to prn





3/28/2023:


Monitoring closely





3/29/2023:


Monitor closely


Maintain robles I suspect neurogenic bladder





3/30/2023:


Monitor closely





(1) CVA (cerebral vascular accident)











COOKIE CHRISTIE DO                Mar 30, 2023 10:43

## 2023-03-31 VITALS — DIASTOLIC BLOOD PRESSURE: 54 MMHG | SYSTOLIC BLOOD PRESSURE: 112 MMHG

## 2023-03-31 VITALS — DIASTOLIC BLOOD PRESSURE: 67 MMHG | SYSTOLIC BLOOD PRESSURE: 116 MMHG

## 2023-03-31 RX ADMIN — BETHANECHOL CHLORIDE SCH MG: 25 TABLET ORAL at 16:11

## 2023-03-31 RX ADMIN — LIDOCAINE SCH EA: 50 PATCH CUTANEOUS at 08:16

## 2023-03-31 RX ADMIN — POTASSIUM CHLORIDE SCH MEQ: 750 TABLET, FILM COATED, EXTENDED RELEASE ORAL at 18:02

## 2023-03-31 RX ADMIN — INSULIN ASPART SCH UNIT: 100 INJECTION, SOLUTION INTRAVENOUS; SUBCUTANEOUS at 11:18

## 2023-03-31 RX ADMIN — METFORMIN HYDROCHLORIDE SCH MG: 500 TABLET, FILM COATED ORAL at 06:09

## 2023-03-31 RX ADMIN — POTASSIUM CHLORIDE SCH MEQ: 750 TABLET, FILM COATED, EXTENDED RELEASE ORAL at 08:18

## 2023-03-31 RX ADMIN — Medication SCH ML: at 13:32

## 2023-03-31 RX ADMIN — BETHANECHOL CHLORIDE SCH MG: 25 TABLET ORAL at 20:58

## 2023-03-31 RX ADMIN — Medication SCH MCG: at 06:09

## 2023-03-31 RX ADMIN — FAMOTIDINE SCH MG: 20 TABLET, FILM COATED ORAL at 20:58

## 2023-03-31 RX ADMIN — ACETAMINOPHEN PRN MG: 325 TABLET ORAL at 18:03

## 2023-03-31 RX ADMIN — PREGABALIN SCH MG: 75 CAPSULE ORAL at 08:18

## 2023-03-31 RX ADMIN — INSULIN ASPART SCH UNIT: 100 INJECTION, SOLUTION INTRAVENOUS; SUBCUTANEOUS at 17:09

## 2023-03-31 RX ADMIN — Medication SCH ML: at 21:05

## 2023-03-31 RX ADMIN — POLYETHYLENE GLYCOL (3350) SCH GM: 17 POWDER, FOR SOLUTION ORAL at 08:56

## 2023-03-31 RX ADMIN — VANCOMYCIN HYDROCHLORIDE SCH MLS/HR: 500 INJECTION, POWDER, LYOPHILIZED, FOR SOLUTION INTRAVENOUS at 13:27

## 2023-03-31 RX ADMIN — MICONAZOLE NITRATE SCH APPLIC: 20 POWDER TOPICAL at 21:14

## 2023-03-31 RX ADMIN — POLYETHYLENE GLYCOL (3350) SCH GM: 17 POWDER, FOR SOLUTION ORAL at 21:14

## 2023-03-31 RX ADMIN — ENOXAPARIN SODIUM SCH MG: 100 INJECTION SUBCUTANEOUS at 16:11

## 2023-03-31 RX ADMIN — INSULIN ASPART SCH UNIT: 100 INJECTION, SOLUTION INTRAVENOUS; SUBCUTANEOUS at 06:02

## 2023-03-31 RX ADMIN — DOCUSATE SODIUM SCH MG: 100 CAPSULE ORAL at 08:18

## 2023-03-31 RX ADMIN — SUCRALFATE SCH GM: 1 TABLET ORAL at 20:58

## 2023-03-31 RX ADMIN — METFORMIN HYDROCHLORIDE SCH MG: 500 TABLET, FILM COATED ORAL at 16:12

## 2023-03-31 RX ADMIN — DOCUSATE SODIUM AND SENNOSIDES SCH EA: 8.6; 5 TABLET, FILM COATED ORAL at 21:14

## 2023-03-31 RX ADMIN — INSULIN ASPART SCH UNIT: 100 INJECTION, SOLUTION INTRAVENOUS; SUBCUTANEOUS at 20:58

## 2023-03-31 RX ADMIN — SUCRALFATE SCH GM: 1 TABLET ORAL at 16:11

## 2023-03-31 RX ADMIN — SUCRALFATE SCH GM: 1 TABLET ORAL at 06:00

## 2023-03-31 RX ADMIN — LEVOTHYROXINE SODIUM SCH MCG: 100 TABLET ORAL at 06:00

## 2023-03-31 RX ADMIN — BETHANECHOL CHLORIDE SCH MG: 25 TABLET ORAL at 06:00

## 2023-03-31 RX ADMIN — SUCRALFATE SCH GM: 1 TABLET ORAL at 11:14

## 2023-03-31 RX ADMIN — Medication SCH ML: at 06:00

## 2023-03-31 RX ADMIN — DOCUSATE SODIUM SCH MG: 100 CAPSULE ORAL at 21:14

## 2023-03-31 RX ADMIN — Medication SCH EACH: at 08:18

## 2023-03-31 RX ADMIN — BETHANECHOL CHLORIDE SCH MG: 25 TABLET ORAL at 11:14

## 2023-03-31 RX ADMIN — AMITRIPTYLINE HYDROCHLORIDE SCH MG: 25 TABLET, FILM COATED ORAL at 20:58

## 2023-03-31 RX ADMIN — NYSTATIN SCH GM: 100000 CREAM TOPICAL at 21:02

## 2023-03-31 RX ADMIN — NYSTATIN SCH GM: 100000 CREAM TOPICAL at 08:17

## 2023-03-31 RX ADMIN — FAMOTIDINE SCH MG: 20 TABLET, FILM COATED ORAL at 08:18

## 2023-03-31 RX ADMIN — DOCUSATE SODIUM AND SENNOSIDES SCH EA: 8.6; 5 TABLET, FILM COATED ORAL at 11:14

## 2023-03-31 RX ADMIN — MICONAZOLE NITRATE SCH APPLIC: 20 POWDER TOPICAL at 08:17

## 2023-03-31 RX ADMIN — NYSTATIN SCH GM: 100000 CREAM TOPICAL at 13:58

## 2023-03-31 RX ADMIN — FOLIC ACID SCH MG: 1 TABLET ORAL at 08:18

## 2023-03-31 RX ADMIN — MELATONIN 3 MG ORAL TABLET SCH MG: 3 TABLET ORAL at 20:59

## 2023-03-31 RX ADMIN — PREGABALIN SCH MG: 75 CAPSULE ORAL at 20:59

## 2023-03-31 RX ADMIN — Medication SCH EACH: at 20:58

## 2023-03-31 NOTE — SPEECH THERAPY DAILY NOTE
Speech Daily Progress Note


Subjective


Date Seen by Provider:  Mar 15, 2023


Time Seen by Provider:  09:15


The patient was seated upright in her bed, awake and alert, upon entrance to her

room by the clinician. The patient greeted the clinician appropriately and was 

agreeable to participation in the speech and language treatment session. The 

patient's mom and aunt are present for the treatment session on this date.





Objective


The clinician discussed and practiced word-finding strategies with the patient 

throughout the clinical bedside swallowing evaluation on this date. As word-

finding difficulties are displayed, the clinician finds the patient's 

frustration level remains lowest when she is provided additional time to find 

the word on her own, without family members or staff attempting to fill-in the 

word for the patient. The word-finding strategy of additional time was discussed

and demonstrated to the patient and the present family members. Additional word-

finding strategies will be introduced throughout subsequent treatment sessions.





The patient remains accurate with simple yes and no questions and tends to 

communicate with increased ease when yes and no questions are provided. The 

patient will intermittently substitute yes for no, however, does an excellent 

job of self-correcting prior to the close of the question.





A right facial droop remains present, however, the patient stated she believes 

sensation on the inner right buccal region has returned to baseline.





Assessment


Assessment Current Status:  Good Progress





Treatment Plan


Continue Plan of Care





Speech Short Term Goals


Short Term Goals


Short Term Goals


1. The patient will display 90% accuracy with confrontational naming of familiar

objects, independently.


Time Frame-STG:  One Week.





Speech Long Term Goals


Long Term Goals


1. The patient will display improved cognitive linguistic skills for safe 

discharge to the least restrictive environment.


Time Frame:  Three Weeks.





Speech-Plan


Treatment Plan


Speech Therapy Treatment Plan:  Continue Plan of Care


Treatment Duration:  Mar 14, 2023


Frequency:  Modified Program (IRF)


Estimated Hrs Per Day:  1 hour per day


Rehab Potential:  Fair


Pt/Family Agrees to Plan:  Yes





Safety Risks/Education


Teaching Recipient:  Patient, Family


Teaching Methods:  Demonstration, Discussion


Response to Teaching:  Verbalize Understanding, Return Demonstration


Education Topics Provided:  


Recommendations, Word-Finding Strategies





Time


Speech Therapy Time In:  09:15


Speech Therapy Time Out:  09:30


DATE:  Mar 15, 2023


Total Billed Time:  15


Billed Treatment Time


1PAWANTARA











LIUDMILA YADAV               Mar 15, 2023 09:38
Speech Daily Progress Note


Subjective


Date Seen by Provider:  Mar 16, 2023


Time Seen by Provider:  11:45


The patient was seated upright in her recliner, awake and alert, upon entrance 

to her room by the clinician. The patient's aunt and mom were present and 

remained throughout the treatment session. The patient remained pleasant and 

participatory throughout the entirety of therapy on this date.





Objective


The patient, the clinician, and the patient's family members discussed areas 

where word-finding and language could be addressed throughout the patient's 

downtime, weekends, and evenings. The clinician demonstrated generative naming 

tasks, confrontational naming of familiar objects in the patient's room, recall 

and retell of discussions, instructions, or recent television shows, and picture

description. The clinician is currently avoiding reading and visual tasks due to

the patient's visual difficulties (double vision). 





The patient demonstrated the above tasks with high accuracy once direct modeling

and additional response time were provided. The patient's family members offer 

excellent support and encouragement throughout the treatment session. At this 

time, the clinician is practicing word-finding hierarchy strategies, to aid in 

unstructured conversation throughout family members (providing additional time, 

providing the initial phoneme, providing a category cue).





Assessment


Assessment Current Status:  Good Progress





Treatment Plan


Continue Plan of Care





Speech Short Term Goals


Short Term Goals


Short Term Goals


1. The patient will display 90% accuracy with confrontational naming of familiar

objects, independently.





2. The patient will demonstrate safe swallowing precautions with 80% accuracy, 

independently.


Time Frame-STG:  One Week.





Speech Long Term Goals


Long Term Goals


1. The patient will display improved cognitive linguistic skills for safe 

discharge to the least restrictive environment.





1. The patient will tolerate the least restrictive diet consistency without s/s 

of suspected aspiration.


Time Frame:  Three Weeks.





Speech-Plan


Treatment Plan


Speech Therapy Treatment Plan:  Continue Plan of Care


Treatment Duration:  Apr 4, 2023


Frequency:  Modified Program (IRF)


Estimated Hrs Per Day:  1 hour per day


Rehab Potential:  Fair


Pt/Family Agrees to Plan:  Yes





Safety Risks/Education


Teaching Recipient:  Patient, Family


Teaching Methods:  Demonstration, Discussion


Response to Teaching:  Verbalize Understanding, Return Demonstration


Education Topics Provided:  


Word Finding Strategies, Word Finding and Language Activities





Time


Speech Therapy Time In:  11:45


Speech Therapy Time Out:  12:00


DATE:  Mar 16, 2023


Total Billed Time:  15


Billed Treatment Time


1, LIUDMILA MILLER               Mar 16, 2023 13:25
Speech Daily Progress Note


Subjective


Date Seen by Provider:  Mar 20, 2023


Time Seen by Provider:  15:53


The patient was lying in bed, tearful, upon entrance to her room by the 

clinician. The patient has two family members present at bedside. The patient 

greeted the clinician appropriately and was initially agreeable to participation

in the dysphagia treatment session.





Objective


Per patient's RN, the patient is struggling to meet her daily nutritional needs 

with P.O. intake alone and her PEG tube was accidentally dislodged throughout 

the evening. Due to this, the dietician was wondering if the patient may be 

appropriate for a diet consistency upgrade to increase the options of the 

patient's menu and possible improving P.O. intake. The clinician highly agrees 

with this plan of care and attempts to complete the progression on this date.





Unfortunately, at this time, the patient is tearful and states she has had a 

"rough" afternoon. The patient requests to rest at this time and re-visit the 

conversation on the subsequent treatment date. The clinician agrees with this 

option and reviews the plan of care moving forward and recommendations with the 

patient and family members. ST to continue the skilled treatment session on the 

next treatment date.





Assessment


Assessment Current Status:  Fair Progress





Treatment Plan


Continue Plan of Care





Speech Short Term Goals


Short Term Goals


Short Term Goals


1. The patient will display 90% accuracy with confrontational naming of familiar

objects, independently.





2. The patient will demonstrate safe swallowing precautions with 80% accuracy, 

independently.


Time Frame-STG:  One Week.





Speech Long Term Goals


Long Term Goals


1. The patient will display improved cognitive linguistic skills for safe 

discharge to the least restrictive environment.





1. The patient will tolerate the least restrictive diet consistency without s/s 

of suspected aspiration.


Time Frame:  Three Weeks.





Speech-Plan


Treatment Plan


Speech Therapy Treatment Plan:  Continue Plan of Care


Treatment Duration:  Apr 4, 2023


Frequency:  Modified Program (IRF)


Estimated Hrs Per Day:  .5 hour per day


Rehab Potential:  Fair


Pt/Family Agrees to Plan:  Yes





Safety Risks/Education


Teaching Recipient:  Patient, Family


Teaching Methods:  Discussion


Response to Teaching:  Verbalize Understanding


Education Topics Provided:  


Plan of Care





Time


Speech Therapy Time In:  15:53


Speech Therapy Time Out:  16:04


DATE:  Mar 20, 2023


Total Billed Time:  11


Billed Treatment Time


1JYOTI ELIZABETH ST               Mar 20, 2023 16:27
Speech Daily Progress Note


Subjective


Date Seen by Provider:  Mar 21, 2023


Time Seen by Provider:  10:30


The patient was seated upright in bed, awake and alert, upon entrance to her 

room by the clinician. The patient greeted the clinician appropriately and was 

agreeable to participation in the skilled treatment session.





Objective


The RN and dietician requested re-evaluation of the oropharyngeal swallow in 

attempts to upgrade the patient's diet consistency and improve P.O. intake. The 

clinician agrees with this request and completed one on this date. The patient 

consumed small sips of thin liquid via straw, applesauce, and a leticia cracker. 

Overt s/s of suspected aspiration were not demonstrated throughout the 

evaluation. While prolonged mastication was present with the leticia cracker, 

complete bolus formation with absent oral pocketing were appreciated. The 

patient reports a continued reduced appetite and a change in taste of all food 

and liquid consistencies.





Throughout the skilled treatment session, the clinician discussed and 

demonstrated word-finding strategies for the patient. The patient continues to 

increase utterance length daily, with a reduction in word-finding pauses present

on this date. At this time, the patient appears to benefit the most from 

additional time to find the word herself.





Recommendations:


- Initiate a regular consistency diet with thin liquids, as tolerated.


- Fully upright and alert for P.O. intake.


- Small, single bites and sips, only.


- Present food and liquid to the strong, left side.


- Assess for right pocketing throughout P.O. intake.


- Crush medication and place in puree for administration.


- Monitor for s/s of suspected aspiration with P.O. intake. If demonstrated, 

please contact speech pathology.





The results and recommendations were shared with the patient, the patient's 

family, and the patient's RN immediately following completion of the treatment 

session. The patient denied additional questions or concerns for the clinician 

at this time.





Assessment


Assessment Current Status:  Good Progress





Treatment Plan


Continue Plan of Care





Speech Short Term Goals


Short Term Goals


Short Term Goals


1. The patient will display 90% accuracy with confrontational naming of familiar

objects, independently.





2. The patient will demonstrate safe swallowing precautions with 80% accuracy, 

independently.


Time Frame-STG:  One Week.





Speech Long Term Goals


Long Term Goals


1. The patient will display improved cognitive linguistic skills for safe 

discharge to the least restrictive environment.





1. The patient will tolerate the least restrictive diet consistency without s/s 

of suspected aspiration.


Time Frame:  Three Weeks.





Speech-Plan


Treatment Plan


Speech Therapy Treatment Plan:  Continue Plan of Care


Treatment Duration:  Apr 4, 2023


Frequency:  Modified Program (IRF)


Estimated Hrs Per Day:  .5 hour per day


Rehab Potential:  Guarded


Pt/Family Agrees to Plan:  Yes





Safety Risks/Education


Teaching Recipient:  Patient


Teaching Methods:  Discussion


Response to Teaching:  Verbalize Understanding, Return Demonstration





Time


Speech Therapy Time In:  10:30


Speech Therapy Time Out:  11:30


DATE:  Mar 21, 2023


Total Billed Time:  60


Billed Treatment Time


1, JYOTI MELO ELIZABETH ST               Mar 21, 2023 12:42
Speech Daily Progress Note


Subjective


Date Seen by Provider:  Mar 22, 2023


Time Seen by Provider:  09:45


The patient was seated upright in bed, awake and alert, upon entrance to her 

room by the clinician. The patient's mom was present at bedside and remained for

the treatment session. The patient greeted the clinician appropriately and was 

agreeable to participation in the therapy.





Objective


The RN contacted the clinician stated the patient may be continuing to have 

difficulty swallowing water, as she intermittently displays coughing. Due to 

this, the clinician focused the initial portions of the treatment session on re-

evaluation of the oropharyngeal swallow with water. The patient's large mug 

straw was replaced with a small, plastic straw which allowed for increased bolus

control. The patient displayed small sips of thin liquid via straw on multiple 

occasions without s/s of suspected aspiration. The clinician reviewed safe 

swallowing precautions extensively with the patient and the patient's mother. 

Per patient, she was reclined prior in the day when she did experience coughing 

with thin liquids. The clinician discussed the importance of upright 

positioning, a reduction of distractions, and small sips. The patient and mother

verbalized comprehension of the discussion and denied additional questions for 

the clinician.





The patient continues to progress positively with word-finding strategies and 

utterance length (increasing). The clinician provided a word-finding exercise on

this date where the clinician provided a category and a specific letter. The 

patient was asked to provide a specific word which was initiated with the letter

and fit into the category. Initially, the patient struggled prior to 

presentation of the letter written for the patient as a visual cue. With the 

visual cue, the patient was able to consistently provide the word.





Assessment


Assessment Current Status:  Good Progress





Treatment Plan


Continue Plan of Care





Speech Short Term Goals


Short Term Goals


Short Term Goals


1. The patient will display 90% accuracy with confrontational naming of familiar

objects, independently.





2. The patient will demonstrate safe swallowing precautions with 80% accuracy, 

independently.


Time Frame-STG:  One Week.





Speech Long Term Goals


Long Term Goals


1. The patient will display improved cognitive linguistic skills for safe 

discharge to the least restrictive environment.





1. The patient will tolerate the least restrictive diet consistency without s/s 

of suspected aspiration.


Time Frame:  Three Weeks.





Speech-Plan


Treatment Plan


Speech Therapy Treatment Plan:  Continue Plan of Care


Treatment Duration:  Apr 4, 2023


Frequency:  Modified Program (IRF)


Estimated Hrs Per Day:  .5 hour per day


Rehab Potential:  Guarded


Pt/Family Agrees to Plan:  Yes





Safety Risks/Education


Teaching Recipient:  Patient, Family


Teaching Methods:  Demonstration, Handout, Discussion


Response to Teaching:  Verbalize Understanding, Return Demonstration, 

Reinforcement Needed





Time


Speech Therapy Time In:  09:45


Speech Therapy Time Out:  10:00


DATE:  Mar 22, 2023


Total Billed Time:  45


Billed Treatment Time


1, DYST, SLTS   





- Additional time: 1130 to 1200











LIUDMILA YADAV               Mar 22, 2023 13:11
Speech Daily Progress Note


Subjective


Date Seen by Provider:  Mar 23, 2023


Time Seen by Provider:  09:30


The patient was seated upright in her bed, awake and alert, upon entrance to her

room by the clinician. The patient greeted the clinician appropriately and was 

agreeable to participation in the language and dysphagia therapy session.





Objective


Prior to the session, the patient's RN contacted the speech therapist and re

ported the patient was not feeling well. Upon contact with the patient, the 

patient stated, "I'm just feeling woozy. I'm feeling really woozy, just 

different." The RN is aware of the patient's current reports to the clinician. 

The patient is currently receiving 2L supplemental oxygen via nasal cannula. The

clinician completed a chart review to assess for concerns noted with vital s

igns, however, concerns were not present. The clinician continued to check in 

with the patient throughout the session. At the close of the session, the 

patient stated, "No, I feel the same. I just feel out of sorts." The information

was provided to the physical therapist, who would be initiating the patient's 

subsequent treatment session.





The patient completed oral motor exercises with direct modeling on this date. 

The patient does appear to be displaying increased right labial range of motion.

The patient did display difficulty producing the "t" phoneme, however, with 

repetition displayed increasing accuracy.





The patient completed oral trials of thin liquid on this date via straw small 

sip. The patient did not display s/s of suspected aspiration with thin liquids 

and denied the presence of s/s of suspected aspiration throughout the prior 

evening.





Assessment


Assessment Current Status:  Good Progress





Treatment Plan


Continue Plan of Care





Speech Short Term Goals


Short Term Goals


Short Term Goals


1. The patient will display 90% accuracy with confrontational naming of familiar

objects, independently.





2. The patient will demonstrate safe swallowing precautions with 80% accuracy, 

independently.


Time Frame-STG:  One Week.





Speech Long Term Goals


Long Term Goals


1. The patient will display improved cognitive linguistic skills for safe 

discharge to the least restrictive environment.





1. The patient will tolerate the least restrictive diet consistency without s/s 

of suspected aspiration.


Time Frame:  Three Weeks.





Speech-Plan


Treatment Plan


Speech Therapy Treatment Plan:  Continue Plan of Care


Treatment Duration:  Apr 4, 2023


Frequency:  Modified Program (IRF)


Estimated Hrs Per Day:  .5 hour per day


Rehab Potential:  Guarded





Safety Risks/Education


Teaching Recipient:  Patient


Teaching Methods:  Demonstration, Handout, Discussion


Response to Teaching:  Verbalize Understanding, Return Demonstration


Education Topics Provided:  


Oral Motor Exercises, Safe Swallowing Precautions





Time


Speech Therapy Time In:  09:30


Speech Therapy Time Out:  10:00


DATE:  Mar 23, 2023


Total Billed Time:  30


Billed Treatment Time


1, JYOTI, LIUDMILA MILLER               Mar 23, 2023 10:20
Speech Daily Progress Note


Subjective


Date Seen by Provider:  Mar 24, 2023


Time Seen by Provider:  09:30


The patient was lying in bed, awake and alert, upon entrance to her room by the 

clinician. The patient greeted the clinician appropriately and was agreeable to 

participation in the language and dysphagia treatment session. The patient's 

 is at bedside and participates in exercises and activities throughout 

the treatment session.





Objective


The patient denied s/s of suspected aspiration with any consistency she 

currently consumes. Additionally, the patient stated her appetite may be slowly 

increasing, as she feels "hungry" for lunch. Safe swallowing precautions were 

discussed extensively with the patient and the patient's . Additionally, 

dysphagia exercises were introduced which included the Kim and the effortful 

swallow. The patient complete five repetitions of each exercise with direct 

clinician model.





A word-finding exercise was introduced which included association. The patient's

 stated the patient communicate a want to him throughout the evening with

use of a diagram of their house he ene (visual cue). The clinician discussed 

the importance of visual cues and how they have aided the patient's progression.

The clinician provided the patient with a word and asked the patient to provide 

any words associated with the item. The patient completed the exercise with fair

accuracy and maximum clinician verbal and visual cueing.





The patient's anomia continues to improve, as a decrease in word-finding pauses 

are experienced, as well as, an increase in utterance length. The patient's 

 displayed excellent support for the patient throughout the treatment.





Assessment


Assessment Current Status:  Good Progress





Treatment Plan


Continue Plan of Care





Speech Short Term Goals


Short Term Goals


Short Term Goals


1. The patient will display 90% accuracy with confrontational naming of familiar

objects, independently.





2. The patient will demonstrate safe swallowing precautions with 80% accuracy, 

independently.


Time Frame-STG:  One Week.





Speech Long Term Goals


Long Term Goals


1. The patient will display improved cognitive linguistic skills for safe 

discharge to the least restrictive environment.





1. The patient will tolerate the least restrictive diet consistency without s/s 

of suspected aspiration.


Time Frame:  Three Weeks.





Speech-Plan


Treatment Plan


Speech Therapy Treatment Plan:  Continue Plan of Care


Treatment Duration:  Apr 4, 2023


Frequency:  Modified Program (IRF)


Estimated Hrs Per Day:  .5 hour per day


Rehab Potential:  Guarded


Pt/Family Agrees to Plan:  Yes





Safety Risks/Education


Teaching Recipient:  Patient, Significant Other


Teaching Methods:  Demonstration, Discussion


Response to Teaching:  Verbalize Understanding, Return Demonstration


Education Topics Provided:  


Word-Finding Strategies, Safe Swallowing Precautions, Dysphagia Exercises,


Oral Motor Exercises





Time


Speech Therapy Time In:  09:30


Speech Therapy Time Out:  10:00


DATE:  Mar 24, 2023


Total Billed Time:  60


Billed Treatment Time


1, DYST, SLTS





1115 to 1142 (second treatment session)











LIUDMILA YADAV               Mar 24, 2023 10:48
Speech Daily Progress Note


Subjective


Date Seen by Provider:  Mar 27, 2023


Time Seen by Provider:  09:30


The patient was lying in bed, awake and alert, upon entrance to her room by the 

clinician. The patient greeted the clinician appropriately and was agreeable to 

participation in the language and dysphagia treatment session.





Objective


The patient denied s/s of suspected aspiration with P.O. intake or concerns 

regarding her oropharyngeal swallowing function. Safe swallowing precautions 

were discussed and reviewed by the clinician.





The patient and clinician discussed and reviewed the hierarchy of word-finding 

and completed word-finding exercises throughout the treatment session. The 

patient was able to display 100% accuracy with confrontational naming of 

familiar objects and 100% accuracy with black and white images. The patient does

display whole word and phonemic paraphasias intermittently, however, is aware 

and self-repairs. Generative naming exercises were completed following, with a 

visual cue for category. The patient displays increased difficulty with 

generative naming tasks, requiring increased clinician verbal cueing.





Assessment


Assessment Current Status:  Good Progress





Treatment Plan


Continue Plan of Care





Speech Short Term Goals


Short Term Goals


Short Term Goals


1. The patient will display 90% accuracy with confrontational naming of familiar

objects, independently.





2. The patient will demonstrate safe swallowing precautions with 80% accuracy, 

independently.


Time Frame-STG:  One Week.





Speech Long Term Goals


Long Term Goals


1. The patient will display improved cognitive linguistic skills for safe 

discharge to the least restrictive environment.





1. The patient will tolerate the least restrictive diet consistency without s/s 

of suspected aspiration.


Time Frame:  Three Weeks.





Speech-Plan


Treatment Plan


Speech Therapy Treatment Plan:  Continue Plan of Care


Treatment Duration:  Apr 4, 2023


Frequency:  Modified Program (IRF)


Estimated Hrs Per Day:  .5 hour per day


Rehab Potential:  Guarded





Safety Risks/Education


Teaching Recipient:  Patient, Family


Teaching Methods:  Demonstration, Discussion


Response to Teaching:  Verbalize Understanding, Return Demonstration


Education Topics Provided:  


Word-Finding Strategies, Safe Swallowing Precautions





Time


Speech Therapy Time In:  09:30


Speech Therapy Time Out:  10:00


DATE:  Mar 27, 2023


Total Billed Time:  30


Billed Treatment Time


JYOTI Hay PAWANLIUDMILA VERGARA                Mar 27, 2023 10:46
Speech Daily Progress Note


Subjective


Date Seen by Provider:  Mar 28, 2023


Time Seen by Provider:  08:30


The patient was seated upright in her wheelchair, finishing her morning ADL 

routine following occupational therapy. The patient greeted the clinician 

appropriately and was agreeable to  participation in the cognitive, speech, 

language, and dysphagia therapy session. 





The patient does report, "I feel a little "off" this morning." The clinician 

offered multiple items for increased comfort but the patient politely deferred 

at this time. The patient denied pain at this time.





Objective


The patient was able to self-propel her wheelchair from the restroom to the side

of the bed, independently. The clinician assisted the patient with elevating her

right arm throughout the skilled treatment session.





The patient denied s/s of suspected aspiration with P.O. intake. The patient 

reported tolerance and comfort with her current P.O. diet consistency and stated

she attempted medication whole the previous evening with the RN. The patient 

denied difficulties with whole medication and requested to continue medications 

without crushing. At this time, the clinician agrees with the upgrade of 

medication whole and recommends this practice as the patient continues her 

progress. If swallowing difficulty with medication is displayed, the patient 

should return to having medication crushed. The clinician and RN visited about 

the above recommendation following the treatment session. 





The patient and clinician reviewed and practiced orientation information. The 

patient was able to identify the month, day of week, and year. The patient 

became tearful during the discussion of her physical location and the clinician 

provided supportive listening and encouragement. Per patient, "Sometimes I don't

know where I'm at." Due to the patient's vision, the clinician is unable to 

provide visual cues in the patient's room for recall of physical location. The 

clinician agreed to complete orientation information verbally with the patient 

each treatment session. Following, the clinician remained tearful, reporting she

was overwhelmed. The clinician provided additional emotional support for the 

patient until occupational therapy was present and continued encouragement and 

listening. Regardless of the patient's displayed emotions, the patient continues

to demonstrate improving word-finding skills and strategy use.





Assessment


Assessment Current Status:  Good Progress





Treatment Plan


Continue Plan of Care





Speech Short Term Goals


Short Term Goals


Short Term Goals


1. The patient will display 90% accuracy with confrontational naming of familiar

objects, independently.





2. The patient will demonstrate safe swallowing precautions with 80% accuracy, 

independently.


Time Frame-STG:  One Week.





Speech Long Term Goals


Long Term Goals


1. The patient will display improved cognitive linguistic skills for safe 

discharge to the least restrictive environment.





1. The patient will tolerate the least restrictive diet consistency without s/s 

of suspected aspiration.


Time Frame:  Three Weeks.





Speech-Plan


Treatment Plan


Speech Therapy Treatment Plan:  Continue Plan of Care


Treatment Duration:  Apr 4, 2023


Frequency:  Modified Program (IRF)


Estimated Hrs Per Day:  .5 hour per day


Rehab Potential:  Guarded


Pt/Family Agrees to Plan:  Yes





Safety Risks/Education


Teaching Recipient:  Patient


Teaching Methods:  Demonstration, Discussion


Response to Teaching:  Verbalize Understanding, Return Demonstration


Education Topics Provided:  


Orientation Strategies, Safe Swallowing Precautions





Time


Speech Therapy Time In:  08:30


Speech Therapy Time Out:  09:00


DATE:  Mar 28, 2023


Total Billed Time:  30


Billed Treatment Time


1, JYOTI MELO [facility]               Mar 28, 2023 09:36
Speech Daily Progress Note


Subjective


Date Seen by Provider:  Mar 29, 2023


Time Seen by Provider:  08:30


The patient was lying in bed, awake and alert, upon entrance to her room by the 

clinician. The patient has the PCT present, who assisting the patient in 

cleansing and a change of clothes. The patient greeted the clinician 

appropriately and was agreeable to participation in the speech, language, and 

dysphagia treatment session.





Objective


The patient and clinician initiate the treatment session by discussing the 

patient's current P.O. intake and any swallowing difficulties the patient may be

occurring. The patient denied swallowing challenges and stated she continues to 

do well with swallowing medication whole. The patient denied s/s of suspected 

aspiration with any P.O. intake she currently consumes. 





The patient and clinician continue language skills through orientation and 

anomia strategies. The patient is able to accurately state her name, the month, 

and the day of the week. The patient displayed whole word paraphasia throughout 

attempts to name the year, however, identified the year correctly through yes 

and no questions. The patient displayed perseveration with identification of the

city and state, continuing to verbally state "[place], no I know that's not 

right. I can see it." The initial letter of the city and the state were provided

for a visual cue and the patient immediately named each item accurately. Visual 

cues continue to be strong areas of assistance for the patient despite visual 

difficulties.





Assessment


Assessment Current Status:  Good Progress





Treatment Plan


Continue Plan of Care





Speech Short Term Goals


Short Term Goals


Short Term Goals


1. The patient will display 90% accuracy with confrontational naming of familiar

objects, independently.





2. The patient will demonstrate safe swallowing precautions with 80% accuracy, 

independently.


Time Frame-STG:  One Week.





Speech Long Term Goals


Long Term Goals


1. The patient will display improved cognitive linguistic skills for safe 

discharge to the least restrictive environment.





1. The patient will tolerate the least restrictive diet consistency without s/s 

of suspected aspiration.


Time Frame:  Three Weeks.





Speech-Plan


Treatment Plan


Speech Therapy Treatment Plan:  Continue Plan of Care


Treatment Duration:  Apr 4, 2023


Frequency:  Modified Program (IRF) (Three to Five Per Day.)


Estimated Hrs Per Day:  .5 hour per day


Rehab Potential:  Guarded


Pt/Family Agrees to Plan:  Yes





Safety Risks/Education


Teaching Recipient:  Patient


Teaching Methods:  Demonstration, Discussion


Response to Teaching:  Return Demonstration


Education Topics Provided:  


Anomia Findings, Safe Swallowing Precautions





Discharge Recommendations


Post Acute ST





Time


Speech Therapy Time In:  08:30


Speech Therapy Time Out:  09:00


DATE:  Mar 29, 2023


Total Billed Time:  30


Billed Treatment Time


1, JYOTI, LIUDMILA MILLER               Mar 29, 2023 10:05
Speech Daily Progress Note


Subjective


Date Seen by Provider:  Mar 30, 2023


Time Seen by Provider:  08:30


The patient was seated upright in her wheelchair, awake and alert, upon entrance

to her room by the clinician. The patient greeted the clinician appropriately 

and was agreeable to participation in the language, speech, and dysphagia 

treatment session.





Objective


The patient and clinician reviewed and discussed the patient's oropharyngeal 

swallow function and completed oral motor exercises on this date. The patient 

denied challenges or concerns with P.O. intake at this time, stating "I am 

trying to eat more." The patient stated she consumes pills, whole, one at a time

without difficulty. The patient denied s/s of suspected aspiration with any 

consistency she currently consumes. The patient denied questions for the 

clinician regarding the swallowing function at this time. Safe swallowing 

precautions were reviewed.





The patient practiced word-finding skills and strategies through a picture 

description task. The patient was provided a picture and asked to describe the 

picture to the clinician. The patient displays whole word paraphasia throughout 

the task, however, with additional time consistently self-corrects errors. The 

clinician encouraged the patient to practice the exercise following discharge.





The patient was asked to keep a journal of specific speech sounds which were 

difficult. The patient verbalized comprehension and completed diadokinetic 

speech tasks with the clinician (with great improvement).





Assessment


Assessment Current Status:  Good Progress





Treatment Plan


Continue Plan of Care





Speech Short Term Goals


Short Term Goals


Short Term Goals


1. The patient will display 90% accuracy with confrontational naming of familiar

objects, independently.





2. The patient will demonstrate safe swallowing precautions with 80% accuracy, 

independently.


Time Frame-STG:  One Week.





Speech Long Term Goals


Long Term Goals


1. The patient will display improved cognitive linguistic skills for safe 

discharge to the least restrictive environment.





1. The patient will tolerate the least restrictive diet consistency without s/s 

of suspected aspiration.


Time Frame:  Three Weeks.





Speech-Plan


Treatment Plan


Speech Therapy Treatment Plan:  Continue Plan of Care


Treatment Duration:  Apr 4, 2023


Frequency:  Modified Program (IRF) (Three to Five Per Day.)


Estimated Hrs Per Day:  .5 hour per day


Rehab Potential:  Guarded


Pt/Family Agrees to Plan:  Yes





Safety Risks/Education


Teaching Recipient:  Patient


Teaching Methods:  Demonstration, Handout, Discussion


Response to Teaching:  Verbalize Understanding, Return Demonstration


Education Topics Provided:  


Oral Motor Exercises, Diadokinetic Exercises, Safe Swallowing Precautions





Time


Speech Therapy Time In:  08:30


Speech Therapy Time Out:  09:00


DATE:  Mar 30, 2023


Total Billed Time:  30


Billed Treatment Time


1, JYOTI, LIUDMILA MILLER               Mar 30, 2023 11:18
Speech Daily Progress Note


Subjective


Date Seen by Provider:  Mar 31, 2023


Time Seen by Provider:  08:30


The patient was lying in bed, awake and alert, upon entrance to her room by the 

clinician. The patient greeted the clinician appropriately and was agreeable to 

participation in the language, speech, and dysphagia treatment session. The 

patient's  is present for the treatment on this date and aids the patient

appropriately.





Objective


The patient's breakfast tray is present. The patient's spouse elevates the head 

of bed for swallowing safety and provides meal set up assistance. The patient 

and spouse display excellent teamwork towards therapy goal achievement and r

ehabilitation strategies. The patient has pancakes with syrup, scrambled eggs, 

Ensure/Boost, orange juice (thin) and water (thin) present on breakfast tray. 

Once meal set up is provided by spouse (cutting pancakes, opening drinks), the 

patient self-feeds. The patient continues to report a decreased appetite but 

continues to attempt P.O. increase. The patient does not display s/s of 

suspected aspiration with P.O. intake throughout the meal. Safe swallowing 

precautions are reviewed.





The patient participates in unstructured conversation throughout her meal. The 

patient responds appropriately to questions and conversational prompts from the 

patient's spouse and clinician. Paraphasia is not present on this date, however,

word finding halts and pauses remain. With additional time, the patient is 

consistently aware of word-finding errors and does locate the accurate word.





The patient has displayed excellent progress towards dysphagia and language 

goals. Speech pathology is highly recommended following discharge. The clinician

provided duplicate handout and exercises for the patient to take home for 

additional practice.





Assessment


Assessment Current Status:  Excellent Progress





Treatment Plan


Continue Plan of Care





Speech Short Term Goals


Short Term Goals


Short Term Goals


1. The patient will display 90% accuracy with confrontational naming of familiar

objects, independently.





2. The patient will demonstrate safe swallowing precautions with 80% accuracy, 

independently.


Time Frame-STG:  One Week.





Speech Long Term Goals


Long Term Goals


1. The patient will display improved cognitive linguistic skills for safe 

discharge to the least restrictive environment.





1. The patient will tolerate the least restrictive diet consistency without s/s 

of suspected aspiration.


Time Frame:  Three Weeks.





Speech-Plan


Treatment Plan


Speech Therapy Treatment Plan:  Continue Plan of Care


Treatment Duration:  Apr 4, 2023


Frequency:  Modified Program (IRF) (Three to Five Per Day.)


Estimated Hrs Per Day:  .5 hour per day


Rehab Potential:  Guarded


Pt/Family Agrees to Plan:  Yes





Safety Risks/Education


Teaching Recipient:  Patient, Significant Other


Teaching Methods:  Demonstration, Discussion


Response to Teaching:  Verbalize Understanding, Return Demonstration


Education Topics Provided:  


Safe Swallowing Strategies





Time


Speech Therapy Time In:  08:30


Speech Therapy Time Out:  09:00


DATE:  Mar 31, 2023


Total Billed Time:  30


Billed Treatment Time


1, DYST, LIUDMILA MILLER               Mar 31, 2023 08:46
Airway patent, nasal mucosa clear, mouth with normal mucosa. Throat has no vesicles, no oropharyngeal exudates and uvula is midline. Clear tympanic membranes bilaterally.

## 2023-03-31 NOTE — OCCUPATIONAL THER DAILY NOTE
OT Current Status-Daily Note


Subjective


Pt alert, sleeping in bed.  Pt agrees to therapy.   present in room.  No 

c/o pain initially though after shower pt c/o buttocks pain, did not rate.





Mental Status/Objective


Patient Orientation:  Person, Place, Time, Situation


Attachments:  Drains (wound vac), [place] Catheter, IV





ADL-Treatment


Pt agrees to shower.  Pt is able to open containers/packages with increased time

and uses regular utensils to eat.  Mod A for supine <--> EOB, sitting EOB 

independent.  Sit to stand lift for all transfers.  Pt sat 100% of the time to 

complete shower on rolling shower chair using grabbars and hand held shower to 

bathe all areas except buttocks. Pt using LH sponge to complete lower legs and 

feet. Set up by positioning clothing to thread R UE then pt completed rest of 

UBD sequence.  Dependent for lower body and footwear.  Independent sitting at 

sink for oral care.  Dependent for toileting.  After session, pt lying in bed 

eating breakfast.  Call light/phone in reach.  All needs met in room.


Therapy Code Descriptions/Definitions 





Functional [place] Measure:


0=Not Assessed/NA        4=Minimal Assistance


1=Total Assistance        5=Supervision or Setup


2=Maximal Assistance  6=Modified [place]


3=Moderate Assistance 7=Complete IndependenceSCALE: Activities may be completed 

with or without assistive devices.





6-Indepedent-patient completes the activity by him/herself with no assistance 

from a helper.


5-Set-up or Clean-up Assistance-helper sets up or cleans up; patient completes 

activity. [place] assists only prior to or  


    following the activity.


4-Supervision or Touching Assistance-helper provides verbal cues and/or 

touching/steadying and/or contact guard assistance as patient completes 

activity. Assistance may be provided   


    throughout the activity or intermittently.


3-Partial/Moderate Assistance-helper does LESS THAN HALF the effort. [place] 

lifts, holds or supports trunk or limbs, but provides less than half the effort.


2-Substantial/Maximal Assistance-helper does MORE THAN HALF the effort. [place] 

lifts or holds trunk or limbs and provides more than half the effort.


2-Vduvfptxb-nnbynu does ALL the effort. Patient does none of the effort to 

complete the activity. Or, the assistance of 2 or more helpers is required for 

the patient to complete the  


    activity.


If activity was not attempted, code reason:


7-Patient Refused.


9-Not Applicable-not attempted and the patient did not perform the activity 

before the current illness, exacerbation or injury.


10-Not Attempted due to Environmental Limitations-(lack of equipment, weather 

restraints, etc.).


88-Not Attempted due to Medical Conditions or Safety Concerns.


Eating (QC):  6


Oral Hygiene (QC):  6


Shower/Bathe Self (QC):  3


Upper Body Dressing (QC):  5


Lower Body Dressing (QC):  1


On/Off Footwear:  1


Toileting Hygiene (QC):  1


Toilet Transfer (QC):  1





OT Short Term Goals


Short Term Goals


Time Frame:  Mar 28, 2023


Eating:  3


Toileting hygiene:  3


Shower/bathe self:  3


Lower body dressing:  3


Putting on/taking off footwear:  3





OT Long Term Goals


Long Term Goals


Time Frame:  2023


Acute change in mental status:  1


Inattention:  0


Disorganized thinkin


Altered level of consciousness:  0


Eating (QC):  5


Oral Hygiene (QC):  5


Toileting Hygiene (QC):  4


Shower/Bathe Self (QC):  4


Upper Body Dressing (QC):  5


Lower Body Dressing (QC):  4


On/Off Footwear (QC):  4


Additional Goals:  1-Demonstrate ADL Tasks, 2-Verbalize Understanding, 3-

ImproveStrength/Russ


1=Demonstrate adherence to instructed precautions during ADL tasks.


2=Patient will verbalize/demonstrate understanding of assistive 

devices/modifications for ADL.


3=Patient will improve strength/tolerance for activity to enable patient to 

perform ADL's.





OT Education/Plan


Problem List/Assessment


Assessment:  Decreased Activ Tolerance, Decreased UE Strength, Dependent Transfe

rs, Impaired Bed Mobility, Impaired Self-Care Skills, Restricted Funct UE ROM





Discharge Recommendations


Plan/Recommendations:  Continue POC





Treatment Plan/Plan of Care


Patient would benefit from OT for education, treatment and training to promote 

independence in ADL's, mobility, safety and/or upper extremity function for 

ADL's.


Plan of Care:  ADL Retraining, Functional Mobility, Group Exercise/Act as Ind, 

UE Funct Exercise/Act, UE Neuromus Re-Ed/Coord, Visual/Perceptual Retrain, W/C 

Management Training


Treatment Duration:  2023


Frequency:  At least 5 of 7 days/Wk (IRF)


Estimated Hrs Per Day:  1.5 hours per day


Agreement:  Yes


Rehab Potential:  Guarded





Time


Start Time:  06:45


Stop Time:  08:00


DATE:  Mar 31, 2023


Total Time Billed (hr/min):  75


Billed Treatment Time


1 visit-ADL 5 (75 min)











ARTIE MC               Mar 31, 2023 07:28

## 2023-03-31 NOTE — PHYSICAL THERAPY DAILY NOTE
PT Daily Note-Current


Subjective


Pt found lying in bed upon entry. Agreed to PT. Reports she slept okay last 

night and went to sleep around midnight. No report of pain.





Pain


Section J - Health Conditions


1. Rarely or not at all


2. Occasionally


3. Frequently


4. Almost constantly


8. Unable to answer


Pain Effect on Sleep:  1


Pain Interference with Therapy:  2


Pain Interference w/Day-to-Day:  2





Mental Status


Attachments:  Drains, [place] Catheter





Transfers


SCALE: Activities may be completed with or without assistive devices.





6-Indepedent-patient completes the activity by him/herself with no assistance 

from a helper.


5-Set-up or Clean-up Assistance-helper sets up or cleans up; patient completes 

activity. [place] assists only prior to or  


    following the activity.


4-Supervision or Touching Assistance-helper provides verbal cues and/or 

touching/steadying and/or contact guard assistance as patient completes 

activity. Assistance may be provided   


    throughout the activity or intermittently.


3-Partial/Moderate Assistance-helper does LESS THAN HALF the effort. [place] 

lifts, holds or supports trunk or limbs, but provides less than half the effort.


2-Substantial/Maximal Assistance-helper does MORE THAN HALF the effort. [place] 

lifts or holds trunk or limbs and provides more than half the effort.


0-[id number]-[id number] does ALL the effort. Patient does none of the effort to 

complete the activity. Or, the assistance of 2 or more helpers is required for 

the patient to complete the  


    activity.


If activity was not attempted, code reason:


7-Patient Refused.


9-Not Applicable-not attempted and the patient did not perform the activity 

before the current illness, exacerbation or injury.


10-Not Attempted due to Environmental Limitations-(lack of equipment, weather 

restraints, etc.).


88-Not Attempted due to Medical Conditions or Safety Concerns.


Roll Left & Right (QC):  6


Lying to Sitting/Side of Bed(Q:  3


Sit to Stand (QC):  1


Chair/Bed-to-Chair Xfer(QC):  1


Pt independent with rolling using bedrail. MOD assist with sit to lying 

transfers to lower LEs on floor and lift trunk. Dependent with sit to stand 

transfers using sit to stand machine. Sit to stand transfer completed at // /c 

two helper MAX assist. Pt transferred to wheelchair from bed via sit to stand 

lift.





Weight Bearing


Weight Bearing/Tolerated


Weight Bearing/Tolerated





Gait Training


Does the Patient Walk?:  No and Walking Goal IS indicated





Treatments


Standing at parallel bars x 4: 30, 15, 20, and 10 seconds respectively





Seated exercises:


Hip add /c ball x 10


LAQ AAROM x 10


Isometric abd x 10


Heel/toe raises x 10





Assessment


Current Status:  Fair Progress


Pt demonstrated improved tolerance to standing this visit. Able to stand at 

parallel bars for up to 30 seconds with MAX 2 person assist. Limited muscle 

strength and endurance displayed with therapeutic exercises. Required AAROM to 

reach full extension with LAQs. Pt moved back to room and remained in wheelchair

post-visit with family present. Continue to progress pt as tolerated per POC to 

improve strength, endurance, and functional ability.





PT Long Term Goals


Long Term Goals


PT Long Term Goals Time Frame:  May 8, 2023


Roll Left & Right (QC):  4


Sit to Lying (QC):  4


Lying-Sitting on Side/Bed(QC):  4


Sit to Stand (QC):  2


Chair/Bed-to-Chair Xfer(QC):  2


Toilet Transfer (QC):  2


Car Transfer (QC):  2


Does the Patient Walk:  No and Walking Goal NOT indicated


Walk 10 feet (QC):  1


Walk 50ft with 2 Turns (QC):  88


Walk 150 ft (QC):  88


Walking 10ft on Uneven Surface:  88


1 Step (curb) (QC):  88


4 Steps (QC):  88


12 Steps (QC):  88


Picking up an Object (QC):  88


Does the Pt use WC or Scooter?:  Yes


Wheel 50 feet with 2 turns (QC:  5 (Patient able to use (L) LE and (B) LE's for 

w/c propulsion)


Type:  Manual


Wheel 150 feet:  5


Type:  Manual





PT Plan


Treatment/Plan


Treatment Plan:  Continue Plan of Care


Treatment Plan:  Bed Mobility, Concurrent Therapy, Education, Functional 

Activity Russ, Functional Strength, Group Therapy, Safety, Therapeutic 

Exercise, Transfers, Other (W/C mobility)


Treatment Duration:  May 8, 2023


Frequency:  At least 5 of 7 days/Wk (IRF)


Estimated Hrs Per Day:  1.5 hours per day


Patient and/or Family Agrees t:  Yes





Time


Time In:  0900


Time Out:  1000


DATE:  Mar 31, 2023


Total Billed Treatment Time:  60


Total Billed Treatment


1 visit


FA x 3


EX x 1











PADDY SINGH PTA                Mar 31, 2023 12:40

## 2023-03-31 NOTE — PHYSICAL THERAPY DAILY NOTE
PT Daily Note-Current


Subjective


Pt found lying in bed upon entry. Agreed to PT. Pt family states that she is 

pretty worn out. No reports of pain.





Pain


Section J - Health Conditions


1. Rarely or not at all


2. Occasionally


3. Frequently


4. Almost constantly


8. Unable to answer


Pain Effect on Sleep:  1


Pain Interference with Therapy:  2


Pain Interference w/Day-to-Day:  2





Mental Status


Patient Orientation:  Person


Attachments:  Drains, Clayton Catheter





Transfers


SCALE: Activities may be completed with or without assistive devices.





6-Indepedent-patient completes the activity by him/herself with no assistance 

from a helper.


5-Set-up or Clean-up Assistance-helper sets up or cleans up; patient completes 

activity. [place] assists only prior to or  


    following the activity.


4-Supervision or Touching Assistance-helper provides verbal cues and/or 

touching/steadying and/or contact guard assistance as patient completes 

activity. Assistance may be provided   


    throughout the activity or intermittently.


3-Partial/Moderate Assistance-helper does LESS THAN HALF the effort. [place] 

lifts, holds or supports trunk or limbs, but provides less than half the effort.


2-Substantial/Maximal Assistance-helper does MORE THAN HALF the effort. [place] 

lifts or holds trunk or limbs and provides more than half the effort.


9-Wrexfeoff-zuumwg does ALL the effort. Patient does none of the effort to 

complete the activity. Or, the assistance of 2 or more helpers is required for 

the patient to complete the  


    activity.


If activity was not attempted, code reason:


7-Patient Refused.


9-Not Applicable-not attempted and the patient did not perform the activity 

before the current illness, exacerbation or injury.


10-Not Attempted due to Environmental Limitations-(lack of equipment, weather 

restraints, etc.).


88-Not Attempted due to Medical Conditions or Safety Concerns.





Weight Bearing


Weight Bearing/Tolerated


Weight Bearing/Tolerated





Treatments


Supine exercises:


Hip add /c ball x 10


AAROM SLRs x 10


Ankles pumps x 10


Quad sets x 10


Glute sets x 10


SAQs x 10





Assessment


Current Status:  Fair Progress


Pt displays muscle fatigue and limited strength with therapeutic exercises. 

Required assist to complete full ROM /c SLRs. Continue to progress pt as 

tolerated per POC to improve strength, endurance, and functional ability.





PT Long Term Goals


Long Term Goals


PT Long Term Goals Time Frame:  May 8, 2023


Roll Left & Right (QC):  4


Sit to Lying (QC):  4


Lying-Sitting on Side/Bed(QC):  4


Sit to Stand (QC):  2


Chair/Bed-to-Chair Xfer(QC):  2


Toilet Transfer (QC):  2


Car Transfer (QC):  2


Does the Patient Walk:  No and Walking Goal NOT indicated


Walk 10 feet (QC):  1


Walk 50ft with 2 Turns (QC):  88


Walk 150 ft (QC):  88


Walking 10ft on Uneven Surface:  88


1 Step (curb) (QC):  88


4 Steps (QC):  88


12 Steps (QC):  88


Picking up an Object (QC):  88


Does the Pt use WC or Scooter?:  Yes


Wheel 50 feet with 2 turns (QC:  5 (Patient able to use (L) LE and (B) LE's for 

w/c propulsion)


Type:  Manual


Wheel 150 feet:  5


Type:  Manual





PT Plan


Treatment/Plan


Treatment Plan:  Continue Plan of Care


Treatment Plan:  Bed Mobility, Concurrent Therapy, Education, Functional 

Activity Russ, Functional Strength, Group Therapy, Safety, Therapeutic 

Exercise, Transfers, Other (W/C mobility)


Treatment Duration:  May 8, 2023


Frequency:  At least 5 of 7 days/Wk (IRF)


Estimated Hrs Per Day:  1.5 hours per day


Patient and/or Family Agrees t:  Yes





Time


Time In:  1115


Time Out:  1130


DATE:  Mar 31, 2023


Total Billed Treatment Time:  15


Total Billed Treatment


1 visit


EX x 1











PADDY SINGH PTA                Mar 31, 2023 12:55

## 2023-03-31 NOTE — PM&R PROGRESS NOTE
Subjective


HPI/CC On Admission


Date Seen by Provider:  Mar 31, 2023


Time Seen by Provider:  11:30


Subjective/Events-last exam


3/31/2023:


Much improved overall


No pain reported


BM+


Vanc done on Monday





Patient/family will need sit to stand lift for safe transfers when Patient 

returns home with family. Patient is unable to safely transfer without lift 

equipment at this time, and lift equipment will decrease likelihood of shear on 

buttocks/sacrum and protect skin from further wounds. 





Patient will need semi electric hospital bed with soft-care mattress as Chiqui

requires positioning of her body to accommodate her current sacral wound and 

hemiplegic Right side, which cannot be provided by a traditional bed. She will 

require frequent changes in body position to prevent decubitus ulcers, and her 

bed mobility requires maximum assistance at this time. 








3/30/2023:


Doing well


No pain reported


Participation is good


DC next week








3/29/2023:


Doing well


Working hard with therapy


Robles catheter will be maintained and Urology f/u will be arranged


No pain reported








3/28/2023:


Doing well


Improved transfers


No pain reported


Robles cath is working well








3/27/2023:


Much improved


Overall having no new issues


Robles catheter is working very well








3/26/2023:


No major issues


Changed catheter and now not leaking and not causing spasms


No other issues


Labs will be checked tomorrow








3/25/2023:


Improved today


Adjusted robles catheter


Pain controlled


Eating well








3/24/2023:


Doing well


 at bedside


No neuro deficits last night


Pain comes and goes with chronic neuropathy


HLIVF








3/23/2023:


Improved status


No pain reported


Had an episode last evening of confusion and vision changes but vitals remained 

stable


Eating some but fluids minimal and UOP decreased so will initiate IVF gently








3/22/2023:


Much improved status 


Robles cath still in place


Urecholine maintained


Infection risk with in-dwelling cath


Very debilitated so unsure she could perform in/out caths at this time








3/21/2023:


No major events


Pain controlled


Dr Walker called me to update me on the MRI findings


No otseo noted under the wound








3/20/2023:


No major events


Improved transfers


Catheter removed but later could not void so replaced robles


Sugars monitored


Dr Walker consulted for wound








3/19/2023:


Much improved


Resting today


Family at bedside


No pain reported


Will DC catheter tomorrow








3/18/2023:


No major issues


DC tube feeding to prevent overfeeding


Dysarthria improved


Reviewed meds








3/17/2023:


Much improved


Participation is good


No falls


Pain is chronic neuropathy


Reinserted Robles catheter due to retention last night








3/16/2023:


Improved dramatically


Stood for 35 seconds with parallel bars


No pain reported except wound


Family at bedside








3/15/2023:


Improved overall


Family at bedside


Wound vac on hold due to bacteria in the wound culture


Dr Zamorano managing the IV abx 








3/14/2023:


Doing much better


Reviewed back history on her pre-existing debility:


10/5/22 gastric ulcer perforation but used wheelchair periodically prior to that

due to neuropathy


11/4/22 Decubitus ulcer admit stage III wound vac placed


1/29/23 used walker but wheelchair for long distances


Speech will see her today





Review of Systems


General:  Fatigue, Malaise


Neurological:  Weakness, Incoordination





Objective


Exam


Vital Signs





Vital Signs








  Date Time  Temp Pulse Resp B/P (MAP) Pulse Ox O2 Delivery O2 Flow Rate FiO2


 


3/31/23 21:00     95 Room Air  


 


3/31/23 20:30 36.2 89 20 116/67 (83)    


 


3/26/23 08:00       0.00 





Capillary Refill :


General Appearance:  No Apparent Distress, WD/WN, Anxious, Chronically ill


HEENT:  PERRL/EOMI, Normal ENT Inspection, Pharynx Normal


Neck:  Full Range of Motion, Normal Inspection, Non Tender, Supple, Carotid 

Bruit


Respiratory:  Chest Non Tender, Lungs Clear, Normal Breath Sounds, No Accessory 

Muscle Use, No Respiratory Distress


Cardiovascular:  Regular Rate, Rhythm, No Edema, No Gallop, No JVD, No Murmur, 

Normal Peripheral Pulses


Gastrointestinal:  Normal Bowel Sounds, No Organomegaly, No Pulsatile Mass, Non 

Tender, Soft


Back:  Normal Inspection, No CVA Tenderness, No Vertebral Tenderness


Extremity:  Normal Capillary Refill, Normal Inspection, Normal Range of Motion, 

Non Tender, No Calf Tenderness, No Pedal Edema


Neurologic/Psychiatric:  Alert, Oriented x3, CNs II-XII Norm as Tested, Abnormal

CNs II-XII, Depressed Affect, Facial Droop, Motor Weakness


Skin:  Normal Color, Warm/Dry


Lymphatic:  No Adenopathy





Results/Procedures


Lab


Patient resulted labs reviewed.





FIM


Transfers


Therapy Code Descriptions/Definitions 





Functional [place] Measure:


0=Not Assessed/NA        4=Minimal Assistance


1=Total Assistance        5=Supervision or Setup


2=Maximal Assistance  6=Modified [place]


3=Moderate Assistance 7=Complete IndependenceSCALE: Activities may be completed 

with or without assistive devices.





6-Indepedent-patient completes the activity by him/herself with no assistance 

from a helper.


5-Set-up or Clean-up Assistance-helper sets up or cleans up; patient completes 

activity. [place] assists only prior to or  


    following the activity.


4-Supervision or Touching Assistance-helper provides verbal cues and/or 

touching/steadying and/or contact guard assistance as patient completes 

activity. Assistance may be provided   


    throughout the activity or intermittently.


3-Partial/Moderate Assistance-helper does LESS THAN HALF the effort. [place] 

lifts, holds or supports trunk or limbs, but provides less than half the effort.


2-Substantial/Maximal Assistance-helper does MORE THAN HALF the effort. [place] 

lifts or holds trunk or limbs and provides more than half the effort.


5-Gbpasohwr-zcpbiv does ALL the effort. Patient does none of the effort to 

complete the activity. Or, the assistance of 2 or more helpers is required for 

the patient to complete the  


    activity.


If activity was not attempted, code reason:


7-Patient Refused.


9-Not Applicable-not attempted and the patient did not perform the activity be

fore the current illness, exacerbation or injury.


10-Not Attempted due to Environmental Limitations-(lack of equipment, weather 

restraints, etc.).


88-Not Attempted due to Medical Conditions or Safety Concerns.


Roll Left to Right (QC):  6 (using bed rail, but requires max (A) to position 

hips/scoot (R)/(L) prior to rolling)


Sit to Lying (QC):  3 (mod (A) to lift LE's onto mattress)


Sit to Stand (QC):  1 (Attempted in // bars x 4 attempts - unable to come to 

upright position x 3 with max (A) of 1, unable to come to standing x 1 with max 

(A) of 2 for ~ 10 seconds.  )


Chair/Bed-to-Chair Xfer(QC):  1 (using sit>stand lift.)


Car Transfer (QC):  88





Gait Training


Does the Patient Walk?:  No and Walking Goal IS indicated


Walk 10 feet (QC):  88


Walk 50 ft with 2 Turns(QC):  88


Walk 150 ft (QC):  88


Walking 10ft/uneven surface-QC:  88





Wheelchair Training


Does the Pt Use a Wheelchair?:  Yes


Wheel 50 ft with 2 turns (QC):  5 (Patient propelled from // bars in gym into 

her room and around foot of bed to opposite side of room SBA only with prn cues 

(2-3) to use feet more for for avoiding obstacles)


Wheel 150 ft (QC):  3


Type of Wheelchair:  Manual





Stair Training


1 Step (curb) (QC):  88


4 Steps (QC):  88


12 Steps (QC):  88





Balance


Picking up an Object (QC):  88





ADL-Treatment


Eating (QC):  6


Oral Hygiene (QC):  6


Shower/Bathe Self (QC):  3


Upper Body Dressing (QC):  5


Lower Body Dressing (QC):  1


On/Off Footwear (QC):  1


Toileting Hygiene (QC):  1


Toilet Transfer (QC):  1





Assessment/Plan


Assessment and Plan


Assess & Plan/Chief Complaint


Assessment:


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


Infective Endocarditis of Atrial Valve


Dysarthria/expressive aphasia


Dysphagia with PEG tube


Right Knee Effusion 


Sacral Decubitus Ulcer with MRSA so started Vanc


Anemia-iron def so ordered Venofer


T2DM - ID


Hypothyroidism


Constipation


Hyperlipidemia


GERD


Chronic debility: (10/5/22 gastric ulcer perforation but used wheelchair 

periodically prior to that due to neuropathy, 11/4/22 Decubitus ulcer admit 

stage III wound vac placed, 1/29/23 used walker but wheelchair for long 

distances)


Urinary retention attempted to DC catheter 3/16/23 and required replacement of 

cath 3/17/23





Plan:


Monitor closely


Change Synthroid to PO


ST consult for dysphagia


Utilize PEG


PT OT





3/14/2023:


Monitor closely


Speech therapy to advanced diet if able





3/15/2023:


Monitor closely


Wound care





3/16/2023:


Dramatically improved


Wound care


IV abx





3/17/2023:


Urecholine


Monitor closely





3/18/2023:


DC tube feeding





3/19/2023:


DC catheter tomorrow








3/20/2023:


Wound care


Robles cath replaced





3/21/2023:


Monitor pain


IV abx





3/22/2023:


Maintain cath


Monitor closely





3/23/2023:


IVF for dehydration





3/24/2023:


HLIVF





3/25/2023:


Adjusted catheter


Azo





3/26/2023:


Monitor closely


Fall risk





3/27/2023:


Change Pyridium to prn





3/28/2023:


Monitoring closely





3/29/2023:


Monitor closely


Maintain robles I suspect neurogenic bladder





3/30/2023:


Monitor closely





3/31/2023:


Monitor closely





(1) CVA (cerebral vascular accident)











COOKIE CHRISTIE DO                Mar 31, 2023 08:08

## 2023-04-01 VITALS — DIASTOLIC BLOOD PRESSURE: 66 MMHG | SYSTOLIC BLOOD PRESSURE: 112 MMHG

## 2023-04-01 VITALS — SYSTOLIC BLOOD PRESSURE: 100 MMHG | DIASTOLIC BLOOD PRESSURE: 54 MMHG

## 2023-04-01 RX ADMIN — BETHANECHOL CHLORIDE SCH MG: 25 TABLET ORAL at 11:10

## 2023-04-01 RX ADMIN — MICONAZOLE NITRATE SCH APPLIC: 20 POWDER TOPICAL at 21:01

## 2023-04-01 RX ADMIN — POTASSIUM CHLORIDE SCH MEQ: 750 TABLET, FILM COATED, EXTENDED RELEASE ORAL at 09:15

## 2023-04-01 RX ADMIN — NYSTATIN SCH GM: 100000 CREAM TOPICAL at 21:01

## 2023-04-01 RX ADMIN — FOLIC ACID SCH MG: 1 TABLET ORAL at 09:15

## 2023-04-01 RX ADMIN — SUCRALFATE SCH GM: 1 TABLET ORAL at 06:29

## 2023-04-01 RX ADMIN — NYSTATIN SCH GM: 100000 CREAM TOPICAL at 09:21

## 2023-04-01 RX ADMIN — FAMOTIDINE SCH MG: 20 TABLET, FILM COATED ORAL at 21:00

## 2023-04-01 RX ADMIN — POLYETHYLENE GLYCOL (3350) SCH GM: 17 POWDER, FOR SOLUTION ORAL at 21:07

## 2023-04-01 RX ADMIN — ENOXAPARIN SODIUM SCH MG: 100 INJECTION SUBCUTANEOUS at 17:06

## 2023-04-01 RX ADMIN — SUCRALFATE SCH GM: 1 TABLET ORAL at 21:00

## 2023-04-01 RX ADMIN — Medication SCH ML: at 21:05

## 2023-04-01 RX ADMIN — Medication SCH ML: at 06:29

## 2023-04-01 RX ADMIN — DOCUSATE SODIUM AND SENNOSIDES SCH EA: 8.6; 5 TABLET, FILM COATED ORAL at 09:14

## 2023-04-01 RX ADMIN — MICONAZOLE NITRATE SCH APPLIC: 20 POWDER TOPICAL at 09:22

## 2023-04-01 RX ADMIN — NYSTATIN SCH GM: 100000 CREAM TOPICAL at 13:41

## 2023-04-01 RX ADMIN — INSULIN ASPART SCH UNIT: 100 INJECTION, SOLUTION INTRAVENOUS; SUBCUTANEOUS at 21:06

## 2023-04-01 RX ADMIN — DOCUSATE SODIUM SCH MG: 100 CAPSULE ORAL at 09:14

## 2023-04-01 RX ADMIN — POLYETHYLENE GLYCOL (3350) SCH GM: 17 POWDER, FOR SOLUTION ORAL at 09:20

## 2023-04-01 RX ADMIN — SUCRALFATE SCH GM: 1 TABLET ORAL at 17:05

## 2023-04-01 RX ADMIN — PREGABALIN SCH MG: 75 CAPSULE ORAL at 21:00

## 2023-04-01 RX ADMIN — Medication SCH ML: at 13:41

## 2023-04-01 RX ADMIN — METFORMIN HYDROCHLORIDE SCH MG: 500 TABLET, FILM COATED ORAL at 17:05

## 2023-04-01 RX ADMIN — BETHANECHOL CHLORIDE SCH MG: 25 TABLET ORAL at 17:05

## 2023-04-01 RX ADMIN — LEVOTHYROXINE SODIUM SCH MCG: 100 TABLET ORAL at 06:29

## 2023-04-01 RX ADMIN — PREGABALIN SCH MG: 75 CAPSULE ORAL at 09:15

## 2023-04-01 RX ADMIN — AMITRIPTYLINE HYDROCHLORIDE SCH MG: 25 TABLET, FILM COATED ORAL at 21:00

## 2023-04-01 RX ADMIN — SUCRALFATE SCH GM: 1 TABLET ORAL at 11:10

## 2023-04-01 RX ADMIN — BETHANECHOL CHLORIDE SCH MG: 25 TABLET ORAL at 06:29

## 2023-04-01 RX ADMIN — BETHANECHOL CHLORIDE SCH MG: 25 TABLET ORAL at 21:00

## 2023-04-01 RX ADMIN — INSULIN ASPART SCH UNIT: 100 INJECTION, SOLUTION INTRAVENOUS; SUBCUTANEOUS at 06:00

## 2023-04-01 RX ADMIN — LIDOCAINE SCH EA: 50 PATCH CUTANEOUS at 09:15

## 2023-04-01 RX ADMIN — INSULIN ASPART SCH UNIT: 100 INJECTION, SOLUTION INTRAVENOUS; SUBCUTANEOUS at 11:12

## 2023-04-01 RX ADMIN — Medication SCH EACH: at 21:00

## 2023-04-01 RX ADMIN — DOCUSATE SODIUM SCH MG: 100 CAPSULE ORAL at 21:00

## 2023-04-01 RX ADMIN — DOCUSATE SODIUM AND SENNOSIDES SCH EA: 8.6; 5 TABLET, FILM COATED ORAL at 21:00

## 2023-04-01 RX ADMIN — Medication SCH EACH: at 09:15

## 2023-04-01 RX ADMIN — POTASSIUM CHLORIDE SCH MEQ: 750 TABLET, FILM COATED, EXTENDED RELEASE ORAL at 17:05

## 2023-04-01 RX ADMIN — FAMOTIDINE SCH MG: 20 TABLET, FILM COATED ORAL at 09:15

## 2023-04-01 RX ADMIN — MELATONIN 3 MG ORAL TABLET SCH MG: 3 TABLET ORAL at 21:00

## 2023-04-01 RX ADMIN — INSULIN ASPART SCH UNIT: 100 INJECTION, SOLUTION INTRAVENOUS; SUBCUTANEOUS at 15:59

## 2023-04-01 RX ADMIN — VANCOMYCIN HYDROCHLORIDE SCH MLS/HR: 500 INJECTION, POWDER, LYOPHILIZED, FOR SOLUTION INTRAVENOUS at 13:37

## 2023-04-01 RX ADMIN — Medication SCH MCG: at 06:29

## 2023-04-01 RX ADMIN — METFORMIN HYDROCHLORIDE SCH MG: 500 TABLET, FILM COATED ORAL at 06:29

## 2023-04-01 NOTE — PHYSICAL THERAPY DAILY NOTE
PT Daily Note-Current


Subjective


Pt found lying in bed upon entry. Agreed to PT. States that she went to sleep at

11 last night and is a little tired today. Reports that she is not having any 

pain.





Pain


Section J - Health Conditions


1. Rarely or not at all


2. Occasionally


3. Frequently


4. Almost constantly


8. Unable to answer


Pain Effect on Sleep:  1


Pain Interference with Therapy:  2


Pain Interference w/Day-to-Day:  2





Mental Status


Attachments:  Drains, [place] Catheter





Transfers


SCALE: Activities may be completed with or without assistive devices.





6-Indepedent-patient completes the activity by him/herself with no assistance 

from a helper.


5-Set-up or Clean-up Assistance-helper sets up or cleans up; patient completes 

activity. [place] assists only prior to or  


    following the activity.


4-Supervision or Touching Assistance-helper provides verbal cues and/or 

touching/steadying and/or contact guard assistance as patient completes 

activity. Assistance may be provided   


    throughout the activity or intermittently.


3-Partial/Moderate Assistance-helper does LESS THAN HALF the effort. [place] 

lifts, holds or supports trunk or limbs, but provides less than half the effort.


2-Substantial/Maximal Assistance-helper does MORE THAN HALF the effort. [place] 

lifts or holds trunk or limbs and provides more than half the effort.


8-Bxsnvrpdj-jnovqr does ALL the effort. Patient does none of the effort to 

complete the activity. Or, the assistance of 2 or more helpers is required for 

the patient to complete the  


    activity.


If activity was not attempted, code reason:


7-Patient Refused.


9-Not Applicable-not attempted and the patient did not perform the activity 

before the current illness, exacerbation or injury.


10-Not Attempted due to Environmental Limitations-(lack of equipment, weather 

restraints, etc.).


88-Not Attempted due to Medical Conditions or Safety Concerns.


Sit to Lying (QC):  3


Lying to Sitting/Side of Bed(Q:  3


Pt MIN assist with bed mobility. Required assistance with lifting and lowing 

affected LE.





Weight Bearing


Weight Bearing/Tolerated


Weight Bearing/Tolerated





Gait Training


Does the Patient Walk?:  No and Walking Goal IS indicated





Treatments


Seated exercise: 


LAQs x 10


Marching x 10


Hip abd/add x 10


Heel/toe raises x 10





Supine exercise:


AAROM SLRs x 10


Quad sets x 10


Heel slides x 10


Glute sets x 10





Assessment


Current Status:  Fair Progress


Pt tolerated therapeutic exercises well. Displays signs of muscle fatigue and 

weakness. Required assistance to complete SLRs on affected side. Continue to 

progress pt as tolerated per POC to increase strength, endurance, and functional

ability.





PT Long Term Goals


Long Term Goals


PT Long Term Goals Time Frame:  May 8, 2023


Roll Left & Right (QC):  4


Sit to Lying (QC):  4


Lying-Sitting on Side/Bed(QC):  4


Sit to Stand (QC):  2


Chair/Bed-to-Chair Xfer(QC):  2


Toilet Transfer (QC):  2


Car Transfer (QC):  2


Does the Patient Walk:  No and Walking Goal NOT indicated


Walk 10 feet (QC):  1


Walk 50ft with 2 Turns (QC):  88


Walk 150 ft (QC):  88


Walking 10ft on Uneven Surface:  88


1 Step (curb) (QC):  88


4 Steps (QC):  88


12 Steps (QC):  88


Picking up an Object (QC):  88


Does the Pt use WC or Scooter?:  Yes


Wheel 50 feet with 2 turns (QC:  5 (Patient able to use (L) LE and (B) LE's for 

w/c propulsion)


Type:  Manual


Wheel 150 feet:  5


Type:  Manual





PT Plan


Treatment/Plan


Treatment Plan:  Continue Plan of Care


Treatment Plan:  Bed Mobility, Concurrent Therapy, Education, Functional 

Activity Russ, Functional Strength, Group Therapy, Safety, Therapeutic 

Exercise, Transfers, Other (W/C mobility)


Treatment Duration:  May 8, 2023


Frequency:  At least 5 of 7 days/Wk (IRF)


Estimated Hrs Per Day:  1.5 hours per day


Patient and/or Family Agrees t:  Yes





Time


Time In:  0754


Time Out:  0817


DATE:  Apr 1, 2023


Total Billed Treatment Time:  23


Total Billed Treatment


1 visit


EX x 2











PADDY SINGH PTA                 Apr 1, 2023 10:49

## 2023-04-01 NOTE — PM&R PROGRESS NOTE
Subjective


HPI/CC On Admission


Date Seen by Provider:  Apr 1, 2023


Time Seen by Provider:  12:00


Subjective/Events-last exam


4/1/2023:


Much improved


Participation is good


Labs tomorrow


DC home








3/31/2023:


Much improved overall


No pain reported


BM+


Vanc done on Monday





Patient/family will need sit to stand lift for safe transfers when Patient 

returns home with family. Patient is unable to safely transfer without lift 

equipment at this time, and lift equipment will decrease likelihood of shear on 

buttocks/sacrum and protect skin from further wounds. 





Patient will need semi electric hospital bed with soft-care mattress as Chiqui

requires positioning of her body to accommodate her current sacral wound and 

hemiplegic Right side, which cannot be provided by a traditional bed. She will 

require frequent changes in body position to prevent decubitus ulcers, and her 

bed mobility requires maximum assistance at this time. 








3/30/2023:


Doing well


No pain reported


Participation is good


DC next week








3/29/2023:


Doing well


Working hard with therapy


Robles catheter will be maintained and Urology f/u will be arranged


No pain reported








3/28/2023:


Doing well


Improved transfers


No pain reported


Robles cath is working well








3/27/2023:


Much improved


Overall having no new issues


Robles catheter is working very well








3/26/2023:


No major issues


Changed catheter and now not leaking and not causing spasms


No other issues


Labs will be checked tomorrow








3/25/2023:


Improved today


Adjusted robles catheter


Pain controlled


Eating well








3/24/2023:


Doing well


 at bedside


No neuro deficits last night


Pain comes and goes with chronic neuropathy


HLIVF








3/23/2023:


Improved status


No pain reported


Had an episode last evening of confusion and vision changes but vitals remained 

stable


Eating some but fluids minimal and UOP decreased so will initiate IVF gently








3/22/2023:


Much improved status 


Robles cath still in place


Urecholine maintained


Infection risk with in-dwelling cath


Very debilitated so unsure she could perform in/out caths at this time








3/21/2023:


No major events


Pain controlled


Dr Walker called me to update me on the MRI findings


No otseo noted under the wound








3/20/2023:


No major events


Improved transfers


Catheter removed but later could not void so replaced robles


Sugars monitored


Dr Walker consulted for wound








3/19/2023:


Much improved


Resting today


Family at bedside


No pain reported


Will DC catheter tomorrow








3/18/2023:


No major issues


DC tube feeding to prevent overfeeding


Dysarthria improved


Reviewed meds








3/17/2023:


Much improved


Participation is good


No falls


Pain is chronic neuropathy


Reinserted Robles catheter due to retention last night








3/16/2023:


Improved dramatically


Stood for 35 seconds with parallel bars


No pain reported except wound


Family at bedside








3/15/2023:


Improved overall


Family at bedside


Wound vac on hold due to bacteria in the wound culture


Dr Zamorano managing the IV abx 








3/14/2023:


Doing much better


Reviewed back history on her pre-existing debility:


10/5/22 gastric ulcer perforation but used wheelchair periodically prior to that

due to neuropathy


11/4/22 Decubitus ulcer admit stage III wound vac placed


1/29/23 used walker but wheelchair for long distances


Speech will see her today





Review of Systems


General:  Fatigue, Malaise





Objective


Exam


Vital Signs





Vital Signs








  Date Time  Temp Pulse Resp B/P (MAP) Pulse Ox O2 Delivery O2 Flow Rate FiO2


 


4/1/23 09:00      Room Air  


 


4/1/23 07:30 36.4 91 20 112/66 (81) 92   


 


3/26/23 08:00       0.00 





Capillary Refill :


General Appearance:  No Apparent Distress, WD/WN, Anxious, Chronically ill


HEENT:  PERRL/EOMI, Normal ENT Inspection, Pharynx Normal


Neck:  Full Range of Motion, Normal Inspection, Non Tender, Supple, Carotid 

Bruit


Respiratory:  Chest Non Tender, Lungs Clear, Normal Breath Sounds, No Accessory 

Muscle Use, No Respiratory Distress


Cardiovascular:  Regular Rate, Rhythm, No Edema, No Gallop, No JVD, No Murmur, 

Normal Peripheral Pulses


Gastrointestinal:  Normal Bowel Sounds, No Organomegaly, No Pulsatile Mass, Non 

Tender, Soft


Back:  Normal Inspection, No CVA Tenderness, No Vertebral Tenderness


Extremity:  Normal Capillary Refill, Normal Inspection, Normal Range of Motion, 

Non Tender, No Calf Tenderness, No Pedal Edema


Neurologic/Psychiatric:  Alert, Oriented x3, CNs II-XII Norm as Tested, Abnormal

CNs II-XII, Depressed Affect, Facial Droop, Motor Weakness


Skin:  Normal Color, Warm/Dry


Lymphatic:  No Adenopathy





Results/Procedures


Lab


Patient resulted labs reviewed.





FIM


Transfers


Therapy Code Descriptions/Definitions 





Functional [place] Measure:


0=Not Assessed/NA        4=Minimal Assistance


1=Total Assistance        5=Supervision or Setup


2=Maximal Assistance  6=Modified [place]


3=Moderate Assistance 7=Complete IndependenceSCALE: Activities may be completed 

with or without assistive devices.





6-Indepedent-patient completes the activity by him/herself with no assistance 

from a helper.


5-Set-up or Clean-up Assistance-helper sets up or cleans up; patient completes 

activity. [place] assists only prior to or  


    following the activity.


4-Supervision or Touching Assistance-helper provides verbal cues and/or 

touching/steadying and/or contact guard assistance as patient completes 

activity. Assistance may be provided   


    throughout the activity or intermittently.


3-Partial/Moderate Assistance-helper does LESS THAN HALF the effort. [place] 

lifts, holds or supports trunk or limbs, but provides less than half the effort.


2-Substantial/Maximal Assistance-helper does MORE THAN HALF the effort. [place] 

lifts or holds trunk or limbs and provides more than half the effort.


0-Xqbzeatnb-fakzrg does ALL the effort. Patient does none of the effort to co

mplete the activity. Or, the assistance of 2 or more helpers is required for the

patient to complete the  


    activity.


If activity was not attempted, code reason:


7-Patient Refused.


9-Not Applicable-not attempted and the patient did not perform the activity 

before the current illness, exacerbation or injury.


10-Not Attempted due to Environmental Limitations-(lack of equipment, weather 

restraints, etc.).


88-Not Attempted due to Medical Conditions or Safety Concerns.


Roll Left to Right (QC):  6


Sit to Lying (QC):  3 (mod (A) to lift LE's onto mattress)


Sit to Stand (QC):  1


Chair/Bed-to-Chair Xfer(QC):  1


Car Transfer (QC):  88





Gait Training


Does the Patient Walk?:  No and Walking Goal IS indicated


Walk 10 feet (QC):  88


Walk 50 ft with 2 Turns(QC):  88


Walk 150 ft (QC):  88


Walking 10ft/uneven surface-QC:  88





Wheelchair Training


Does the Pt Use a Wheelchair?:  Yes


Wheel 50 ft with 2 turns (QC):  5 (Patient propelled from // bars in gym into 

her room and around foot of bed to opposite side of room SBA only with prn cues 

(2-3) to use feet more for for avoiding obstacles)


Wheel 150 ft (QC):  3


Type of Wheelchair:  Manual





Stair Training


1 Step (curb) (QC):  88


4 Steps (QC):  88


12 Steps (QC):  88





Balance


Picking up an Object (QC):  88





ADL-Treatment


Eating (QC):  6


Oral Hygiene (QC):  6


Shower/Bathe Self (QC):  3


Upper Body Dressing (QC):  5


Lower Body Dressing (QC):  1


On/Off Footwear (QC):  1


Toileting Hygiene (QC):  1


Toilet Transfer (QC):  1





Assessment/Plan


Assessment and Plan


Assess & Plan/Chief Complaint


Assessment:


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


Infective Endocarditis of Atrial Valve


Dysarthria/expressive aphasia


Dysphagia with PEG tube


Right Knee Effusion 


Sacral Decubitus Ulcer with MRSA so started Vanc


Anemia-iron def so ordered Venofer


T2DM - ID


Hypothyroidism


Constipation


Hyperlipidemia


GERD


Chronic debility: (10/5/22 gastric ulcer perforation but used wheelchair 

periodically prior to that due to neuropathy, 11/4/22 Decubitus ulcer admit 

stage III wound vac placed, 1/29/23 used walker but wheelchair for long 

distances)


Urinary retention attempted to DC catheter 3/16/23 and required replacement of 

cath 3/17/23





Plan:


Monitor closely


Change Synthroid to PO


ST consult for dysphagia


Utilize PEG


PT OT





3/14/2023:


Monitor closely


Speech therapy to advanced diet if able





3/15/2023:


Monitor closely


Wound care





3/16/2023:


Dramatically improved


Wound care


IV abx





3/17/2023:


Urecholine


Monitor closely





3/18/2023:


DC tube feeding





3/19/2023:


DC catheter tomorrow








3/20/2023:


Wound care


Robles cath replaced





3/21/2023:


Monitor pain


IV abx





3/22/2023:


Maintain cath


Monitor closely





3/23/2023:


IVF for dehydration





3/24/2023:


HLIVF





3/25/2023:


Adjusted catheter


Azo





3/26/2023:


Monitor closely


Fall risk





3/27/2023:


Change Pyridium to prn





3/28/2023:


Monitoring closely





3/29/2023:


Monitor closely


Maintain robles I suspect neurogenic bladder





3/30/2023:


Monitor closely





3/31/2023:


Monitor closely





4/1/2023:


Labs tomorrow


Home Monday





(1) CVA (cerebral vascular accident)











COOKIE CHRISTIE DO                 Apr 1, 2023 07:27

## 2023-04-02 VITALS — DIASTOLIC BLOOD PRESSURE: 58 MMHG | SYSTOLIC BLOOD PRESSURE: 117 MMHG

## 2023-04-02 VITALS — SYSTOLIC BLOOD PRESSURE: 107 MMHG | DIASTOLIC BLOOD PRESSURE: 66 MMHG

## 2023-04-02 LAB
ALBUMIN SERPL-MCNC: 2.8 GM/DL (ref 3.2–4.5)
ALP SERPL-CCNC: 73 U/L (ref 40–136)
ALT SERPL-CCNC: 10 U/L (ref 0–55)
BASOPHILS # BLD AUTO: 0 10^3/UL (ref 0–0.1)
BASOPHILS NFR BLD AUTO: 1 % (ref 0–10)
BILIRUB SERPL-MCNC: 0.3 MG/DL (ref 0.1–1)
BUN/CREAT SERPL: 28
CALCIUM SERPL-MCNC: 9.2 MG/DL (ref 8.5–10.1)
CHLORIDE SERPL-SCNC: 106 MMOL/L (ref 98–107)
CO2 SERPL-SCNC: 24 MMOL/L (ref 21–32)
CREAT SERPL-MCNC: 0.54 MG/DL (ref 0.6–1.3)
EOSINOPHIL # BLD AUTO: 0.2 10^3/UL (ref 0–0.3)
EOSINOPHIL NFR BLD AUTO: 3 % (ref 0–10)
GFR SERPLBLD BASED ON 1.73 SQ M-ARVRAT: 107 ML/MIN
GLUCOSE SERPL-MCNC: 117 MG/DL (ref 70–105)
HCT VFR BLD CALC: 30 % (ref 35–52)
HGB BLD-MCNC: 9.1 G/DL (ref 11.5–16)
LYMPHOCYTES # BLD AUTO: 1.1 10^3/UL (ref 1–4)
LYMPHOCYTES NFR BLD AUTO: 25 % (ref 12–44)
MANUAL DIFFERENTIAL PERFORMED BLD QL: NO
MCH RBC QN AUTO: 24 PG (ref 25–34)
MCHC RBC AUTO-ENTMCNC: 30 G/DL (ref 32–36)
MCV RBC AUTO: 81 FL (ref 80–99)
MONOCYTES # BLD AUTO: 0.4 10^3/UL (ref 0–1)
MONOCYTES NFR BLD AUTO: 8 % (ref 0–12)
NEUTROPHILS # BLD AUTO: 2.8 10^3/UL (ref 1.8–7.8)
NEUTROPHILS NFR BLD AUTO: 63 % (ref 42–75)
PLATELET # BLD: 204 10^3/UL (ref 130–400)
PMV BLD AUTO: 9 FL (ref 9–12.2)
POTASSIUM SERPL-SCNC: 3.8 MMOL/L (ref 3.6–5)
PROT SERPL-MCNC: 5.7 GM/DL (ref 6.4–8.2)
SODIUM SERPL-SCNC: 138 MMOL/L (ref 135–145)
WBC # BLD AUTO: 4.4 10^3/UL (ref 4.3–11)

## 2023-04-02 RX ADMIN — BETHANECHOL CHLORIDE SCH MG: 25 TABLET ORAL at 20:42

## 2023-04-02 RX ADMIN — SUCRALFATE SCH GM: 1 TABLET ORAL at 16:58

## 2023-04-02 RX ADMIN — POLYETHYLENE GLYCOL (3350) SCH GM: 17 POWDER, FOR SOLUTION ORAL at 20:43

## 2023-04-02 RX ADMIN — SUCRALFATE SCH GM: 1 TABLET ORAL at 05:29

## 2023-04-02 RX ADMIN — BETHANECHOL CHLORIDE SCH MG: 25 TABLET ORAL at 11:56

## 2023-04-02 RX ADMIN — INSULIN ASPART SCH UNIT: 100 INJECTION, SOLUTION INTRAVENOUS; SUBCUTANEOUS at 20:49

## 2023-04-02 RX ADMIN — SUCRALFATE SCH GM: 1 TABLET ORAL at 20:43

## 2023-04-02 RX ADMIN — PREGABALIN SCH MG: 75 CAPSULE ORAL at 20:42

## 2023-04-02 RX ADMIN — INSULIN ASPART SCH UNIT: 100 INJECTION, SOLUTION INTRAVENOUS; SUBCUTANEOUS at 06:00

## 2023-04-02 RX ADMIN — Medication SCH ML: at 20:44

## 2023-04-02 RX ADMIN — LIDOCAINE SCH EA: 50 PATCH CUTANEOUS at 08:32

## 2023-04-02 RX ADMIN — MICONAZOLE NITRATE SCH APPLIC: 20 POWDER TOPICAL at 20:48

## 2023-04-02 RX ADMIN — POLYETHYLENE GLYCOL (3350) SCH GM: 17 POWDER, FOR SOLUTION ORAL at 08:32

## 2023-04-02 RX ADMIN — VANCOMYCIN HYDROCHLORIDE SCH MLS/HR: 500 INJECTION, POWDER, LYOPHILIZED, FOR SOLUTION INTRAVENOUS at 12:33

## 2023-04-02 RX ADMIN — NYSTATIN SCH GM: 100000 CREAM TOPICAL at 08:34

## 2023-04-02 RX ADMIN — BETHANECHOL CHLORIDE SCH MG: 25 TABLET ORAL at 05:29

## 2023-04-02 RX ADMIN — MICONAZOLE NITRATE SCH APPLIC: 20 POWDER TOPICAL at 08:32

## 2023-04-02 RX ADMIN — INSULIN ASPART SCH UNIT: 100 INJECTION, SOLUTION INTRAVENOUS; SUBCUTANEOUS at 16:53

## 2023-04-02 RX ADMIN — Medication SCH MCG: at 05:29

## 2023-04-02 RX ADMIN — DOCUSATE SODIUM SCH MG: 100 CAPSULE ORAL at 20:42

## 2023-04-02 RX ADMIN — SUCRALFATE SCH GM: 1 TABLET ORAL at 11:56

## 2023-04-02 RX ADMIN — DOCUSATE SODIUM AND SENNOSIDES SCH EA: 8.6; 5 TABLET, FILM COATED ORAL at 20:42

## 2023-04-02 RX ADMIN — NYSTATIN SCH GM: 100000 CREAM TOPICAL at 12:36

## 2023-04-02 RX ADMIN — FOLIC ACID SCH MG: 1 TABLET ORAL at 08:31

## 2023-04-02 RX ADMIN — DOCUSATE SODIUM AND SENNOSIDES SCH EA: 8.6; 5 TABLET, FILM COATED ORAL at 08:30

## 2023-04-02 RX ADMIN — MELATONIN 3 MG ORAL TABLET SCH MG: 3 TABLET ORAL at 20:43

## 2023-04-02 RX ADMIN — LEVOTHYROXINE SODIUM SCH MCG: 100 TABLET ORAL at 05:29

## 2023-04-02 RX ADMIN — BISACODYL PRN MG: 10 SUPPOSITORY RECTAL at 10:09

## 2023-04-02 RX ADMIN — Medication SCH ML: at 12:35

## 2023-04-02 RX ADMIN — Medication SCH EACH: at 08:30

## 2023-04-02 RX ADMIN — AMITRIPTYLINE HYDROCHLORIDE SCH MG: 25 TABLET, FILM COATED ORAL at 20:42

## 2023-04-02 RX ADMIN — INSULIN ASPART SCH UNIT: 100 INJECTION, SOLUTION INTRAVENOUS; SUBCUTANEOUS at 11:03

## 2023-04-02 RX ADMIN — METFORMIN HYDROCHLORIDE SCH MG: 500 TABLET, FILM COATED ORAL at 16:58

## 2023-04-02 RX ADMIN — Medication SCH ML: at 05:29

## 2023-04-02 RX ADMIN — FAMOTIDINE SCH MG: 20 TABLET, FILM COATED ORAL at 08:30

## 2023-04-02 RX ADMIN — PREGABALIN SCH MG: 75 CAPSULE ORAL at 08:30

## 2023-04-02 RX ADMIN — POTASSIUM CHLORIDE SCH MEQ: 750 TABLET, FILM COATED, EXTENDED RELEASE ORAL at 16:58

## 2023-04-02 RX ADMIN — METFORMIN HYDROCHLORIDE SCH MG: 500 TABLET, FILM COATED ORAL at 05:29

## 2023-04-02 RX ADMIN — Medication SCH EACH: at 20:42

## 2023-04-02 RX ADMIN — FAMOTIDINE SCH MG: 20 TABLET, FILM COATED ORAL at 20:43

## 2023-04-02 RX ADMIN — BETHANECHOL CHLORIDE SCH MG: 25 TABLET ORAL at 16:58

## 2023-04-02 RX ADMIN — ENOXAPARIN SODIUM SCH MG: 100 INJECTION SUBCUTANEOUS at 16:57

## 2023-04-02 RX ADMIN — DOCUSATE SODIUM SCH MG: 100 CAPSULE ORAL at 08:30

## 2023-04-02 RX ADMIN — POTASSIUM CHLORIDE SCH MEQ: 750 TABLET, FILM COATED, EXTENDED RELEASE ORAL at 08:31

## 2023-04-02 RX ADMIN — NYSTATIN SCH GM: 100000 CREAM TOPICAL at 20:49

## 2023-04-02 NOTE — PM&R PROGRESS NOTE
Subjective


HPI/CC On Admission


Date Seen by Provider:  Apr 2, 2023


Time Seen by Provider:  12:00


Subjective/Events-last exam


4/2/2023:


No major issues


Ready for DC tomorrow








4/1/2023:


Much improved


Participation is good


Labs tomorrow


DC home








3/31/2023:


Much improved overall


No pain reported


BM+


Vanc done on Monday





Patient/family will need sit to stand lift for safe transfers when Patient 

returns home with family. Patient is unable to safely transfer without lift 

equipment at this time, and lift equipment will decrease likelihood of shear on 

buttocks/sacrum and protect skin from further wounds. 





Patient will need semi electric hospital bed with soft-care mattress as Chiqui

requires positioning of her body to accommodate her current sacral wound and 

hemiplegic Right side, which cannot be provided by a traditional bed. She will 

require frequent changes in body position to prevent decubitus ulcers, and her 

bed mobility requires maximum assistance at this time. 








3/30/2023:


Doing well


No pain reported


Participation is good


DC next week








3/29/2023:


Doing well


Working hard with therapy


Robles catheter will be maintained and Urology f/u will be arranged


No pain reported








3/28/2023:


Doing well


Improved transfers


No pain reported


Robles cath is working well








3/27/2023:


Much improved


Overall having no new issues


Robles catheter is working very well








3/26/2023:


No major issues


Changed catheter and now not leaking and not causing spasms


No other issues


Labs will be checked tomorrow








3/25/2023:


Improved today


Adjusted robles catheter


Pain controlled


Eating well








3/24/2023:


Doing well


 at bedside


No neuro deficits last night


Pain comes and goes with chronic neuropathy


HLIVF








3/23/2023:


Improved status


No pain reported


Had an episode last evening of confusion and vision changes but vitals remained 

stable


Eating some but fluids minimal and UOP decreased so will initiate IVF gently








3/22/2023:


Much improved status 


Robles cath still in place


Urecholine maintained


Infection risk with in-dwelling cath


Very debilitated so unsure she could perform in/out caths at this time








3/21/2023:


No major events


Pain controlled


Dr Walker called me to update me on the MRI findings


No otseo noted under the wound








3/20/2023:


No major events


Improved transfers


Catheter removed but later could not void so replaced robles


Sugars monitored


Dr Walker consulted for wound








3/19/2023:


Much improved


Resting today


Family at bedside


No pain reported


Will DC catheter tomorrow








3/18/2023:


No major issues


DC tube feeding to prevent overfeeding


Dysarthria improved


Reviewed meds








3/17/2023:


Much improved


Participation is good


No falls


Pain is chronic neuropathy


Reinserted Robles catheter due to retention last night








3/16/2023:


Improved dramatically


Stood for 35 seconds with parallel bars


No pain reported except wound


Family at bedside








3/15/2023:


Improved overall


Family at bedside


Wound vac on hold due to bacteria in the wound culture


Dr Zamorano managing the IV abx 








3/14/2023:


Doing much better


Reviewed back history on her pre-existing debility:


10/5/22 gastric ulcer perforation but used wheelchair periodically prior to that

due to neuropathy


11/4/22 Decubitus ulcer admit stage III wound vac placed


1/29/23 used walker but wheelchair for long distances


Speech will see her today





Review of Systems


General:  Fatigue, Malaise


Neurological:  Weakness, Incoordination





Objective


Exam


Vital Signs





Vital Signs








  Date Time  Temp Pulse Resp B/P (MAP) Pulse Ox O2 Delivery O2 Flow Rate FiO2


 


4/2/23 20:53      Room Air  


 


4/2/23 20:51 36.1 89 18 117/58 (77) 94   





Capillary Refill :


General Appearance:  No Apparent Distress, WD/WN, Anxious, Chronically ill


HEENT:  PERRL/EOMI, Normal ENT Inspection, Pharynx Normal


Neck:  Full Range of Motion, Normal Inspection, Non Tender, Supple, Carotid 

Bruit


Respiratory:  Chest Non Tender, Lungs Clear, Normal Breath Sounds, No Accessory 

Muscle Use, No Respiratory Distress


Cardiovascular:  Regular Rate, Rhythm, No Edema, No Gallop, No JVD, No Murmur, 

Normal Peripheral Pulses


Gastrointestinal:  Normal Bowel Sounds, No Organomegaly, No Pulsatile Mass, Non 

Tender, Soft


Back:  Normal Inspection, No CVA Tenderness, No Vertebral Tenderness


Extremity:  Normal Capillary Refill, Normal Inspection, Normal Range of Motion, 

Non Tender, No Calf Tenderness, No Pedal Edema


Neurologic/Psychiatric:  Alert, Oriented x3, CNs II-XII Norm as Tested, Abnormal

CNs II-XII, Depressed Affect, Facial Droop, Motor Weakness


Skin:  Normal Color, Warm/Dry


Lymphatic:  No Adenopathy





Results/Procedures


Lab


Laboratory Tests


4/2/23 05:25








Patient resulted labs reviewed.





FIM


Transfers


Therapy Code Descriptions/Definitions 





Functional [place] Measure:


0=Not Assessed/NA        4=Minimal Assistance


1=Total Assistance        5=Supervision or Setup


2=Maximal Assistance  6=Modified Brown


3=Moderate Assistance 7=Complete IndependenceSCALE: Activities may be completed 

with or without assistive devices.





6-Indepedent-patient completes the activity by him/herself with no assistance 

from a helper.


5-Set-up or Clean-up Assistance-helper sets up or cleans up; patient completes 

activity. [place] assists only prior to or  


    following the activity.


4-Supervision or Touching Assistance-helper provides verbal cues and/or 

touching/steadying and/or contact guard assistance as patient completes 

activity. Assistance may be provided   


    throughout the activity or intermittently.


3-Partial/Moderate Assistance-helper does LESS THAN HALF the effort. [place] 

lifts, holds or supports trunk or limbs, but provides less than half the effort.


2-Substantial/Maximal Assistance-helper does MORE THAN HALF the effort. [place] 

lifts or holds trunk or limbs and provides more than half the effort.


2-Qndgylmet-ueqncy does ALL the effort. Patient does none of the effort to compl

ete the activity. Or, the assistance of 2 or more helpers is required for the 

patient to complete the  


    activity.


If activity was not attempted, code reason:


7-Patient Refused.


9-Not Applicable-not attempted and the patient did not perform the activity 

before the current illness, exacerbation or injury.


10-Not Attempted due to Environmental Limitations-(lack of equipment, weather 

restraints, etc.).


88-Not Attempted due to Medical Conditions or Safety Concerns.


Roll Left to Right (QC):  6


Sit to Lying (QC):  3


Sit to Stand (QC):  1


Chair/Bed-to-Chair Xfer(QC):  1


Car Transfer (QC):  88





Gait Training


Does the Patient Walk?:  No and Walking Goal IS indicated


Walk 10 feet (QC):  88


Walk 50 ft with 2 Turns(QC):  88


Walk 150 ft (QC):  88


Walking 10ft/uneven surface-QC:  88





Wheelchair Training


Does the Pt Use a Wheelchair?:  Yes


Wheel 50 ft with 2 turns (QC):  5 (Patient propelled from // bars in gym into 

her room and around foot of bed to opposite side of room SBA only with prn cues 

(2-3) to use feet more for for avoiding obstacles)


Wheel 150 ft (QC):  3


Type of Wheelchair:  Manual





Stair Training


1 Step (curb) (QC):  88


4 Steps (QC):  88


12 Steps (QC):  88





Balance


Picking up an Object (QC):  88





ADL-Treatment


Eating (QC):  6


Oral Hygiene (QC):  6


Shower/Bathe Self (QC):  3


Upper Body Dressing (QC):  5


Lower Body Dressing (QC):  1


On/Off Footwear (QC):  1


Toileting Hygiene (QC):  1


Toilet Transfer (QC):  1





Assessment/Plan


Assessment and Plan


Assess & Plan/Chief Complaint


Assessment:


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


Infective Endocarditis of Atrial Valve


Dysarthria/expressive aphasia


Dysphagia with PEG tube


Right Knee Effusion 


Sacral Decubitus Ulcer with MRSA so started Vanc


Anemia-iron def so ordered Venofer


T2DM - ID


Hypothyroidism


Constipation


Hyperlipidemia


GERD


Chronic debility: (10/5/22 gastric ulcer perforation but used wheelchair 

periodically prior to that due to neuropathy, 11/4/22 Decubitus ulcer admit 

stage III wound vac placed, 1/29/23 used walker but wheelchair for long 

distances)


Urinary retention attempted to DC catheter 3/16/23 and required replacement of 

cath 3/17/23





Plan:


Monitor closely


Change Synthroid to PO


ST consult for dysphagia


Utilize PEG


PT OT





3/14/2023:


Monitor closely


Speech therapy to advanced diet if able





3/15/2023:


Monitor closely


Wound care





3/16/2023:


Dramatically improved


Wound care


IV abx





3/17/2023:


Urecholine


Monitor closely





3/18/2023:


DC tube feeding





3/19/2023:


DC catheter tomorrow








3/20/2023:


Wound care


Robles cath replaced





3/21/2023:


Monitor pain


IV abx





3/22/2023:


Maintain cath


Monitor closely





3/23/2023:


IVF for dehydration





3/24/2023:


HLIVF





3/25/2023:


Adjusted catheter


Azo





3/26/2023:


Monitor closely


Fall risk





3/27/2023:


Change Pyridium to prn





3/28/2023:


Monitoring closely





3/29/2023:


Monitor closely


Maintain robles I suspect neurogenic bladder





3/30/2023:


Monitor closely





3/31/2023:


Monitor closely





4/1/2023:


Labs tomorrow


Home Monday 4/2/2023:


DC tomorrow





(1) CVA (cerebral vascular accident)











COOKIE CHRISTIE DO                 Apr 2, 2023 06:28

## 2023-04-03 VITALS — DIASTOLIC BLOOD PRESSURE: 53 MMHG | SYSTOLIC BLOOD PRESSURE: 121 MMHG

## 2023-04-03 RX ADMIN — METFORMIN HYDROCHLORIDE SCH MG: 500 TABLET, FILM COATED ORAL at 05:59

## 2023-04-03 RX ADMIN — PREGABALIN SCH MG: 75 CAPSULE ORAL at 08:23

## 2023-04-03 RX ADMIN — DOCUSATE SODIUM AND SENNOSIDES SCH EA: 8.6; 5 TABLET, FILM COATED ORAL at 08:23

## 2023-04-03 RX ADMIN — MICONAZOLE NITRATE SCH APPLIC: 20 POWDER TOPICAL at 08:28

## 2023-04-03 RX ADMIN — BETHANECHOL CHLORIDE SCH MG: 25 TABLET ORAL at 12:24

## 2023-04-03 RX ADMIN — Medication SCH MCG: at 05:58

## 2023-04-03 RX ADMIN — FOLIC ACID SCH MG: 1 TABLET ORAL at 08:23

## 2023-04-03 RX ADMIN — LIDOCAINE SCH EA: 50 PATCH CUTANEOUS at 08:23

## 2023-04-03 RX ADMIN — POTASSIUM CHLORIDE SCH MEQ: 750 TABLET, FILM COATED, EXTENDED RELEASE ORAL at 08:23

## 2023-04-03 RX ADMIN — SUCRALFATE SCH GM: 1 TABLET ORAL at 05:59

## 2023-04-03 RX ADMIN — FAMOTIDINE SCH MG: 20 TABLET, FILM COATED ORAL at 08:23

## 2023-04-03 RX ADMIN — Medication SCH ML: at 05:58

## 2023-04-03 RX ADMIN — SUCRALFATE SCH GM: 1 TABLET ORAL at 12:24

## 2023-04-03 RX ADMIN — NYSTATIN SCH GM: 100000 CREAM TOPICAL at 08:28

## 2023-04-03 RX ADMIN — ACETAMINOPHEN PRN MG: 325 TABLET ORAL at 14:08

## 2023-04-03 RX ADMIN — DOCUSATE SODIUM SCH MG: 100 CAPSULE ORAL at 08:23

## 2023-04-03 RX ADMIN — INSULIN ASPART SCH UNIT: 100 INJECTION, SOLUTION INTRAVENOUS; SUBCUTANEOUS at 05:59

## 2023-04-03 RX ADMIN — LEVOTHYROXINE SODIUM SCH MCG: 100 TABLET ORAL at 05:58

## 2023-04-03 RX ADMIN — POLYETHYLENE GLYCOL (3350) SCH GM: 17 POWDER, FOR SOLUTION ORAL at 08:27

## 2023-04-03 RX ADMIN — BETHANECHOL CHLORIDE SCH MG: 25 TABLET ORAL at 05:58

## 2023-04-03 RX ADMIN — Medication SCH EACH: at 08:23

## 2023-04-03 NOTE — THERAPY TEAM DISCHARGE SUMMARY
Therapy Discharge Summary


Discharge Recommendations


Date of Discharge


Apr 3, 2023 at 14:10





Physical Therapy


Patient seen on ARU s/p CVA.  Patient demonstrate (R) sided weakness and (B) 

motor planning deficits. Patient's functional status was as follows at the time 

of D/C:


Pt MIN assist with all bed mobility. Dependent with all other transfers using 

sit to stand machine.


Patient is unable to walk at this time and is w/c level.  However her w/c 

mobility progressed from max (A) to SBA up to 150' using the (L) UE and (B) LE's

- no physical assist required other than set-up for robles and wound-vac.


Patient unable to stand without lift for testing of ability to pick item up off 

floor.  


Family training completed with  and mother re. using sit>stand lift and 

positioning in bed for wound healing and w/c propulsion.


Roll Left to Right (QC):  3


Sit to Lying (QC):  3


Lying to Sitting/Side of Bed(Q:  3


Sit to Stand (QC):  1


Chair/Bed-to-Chair Xfer(QC):  1


Toilet Transfer (QC):  1


Car Transfer (QC):  1


Does the Patient Walk:  No and Walking Goal IS indicated


Mode of Locomotion:  Wheelchair


Anticipated Mode of Locomotion:  Wheelchair


Walk 10 feet (QC):  88


Walk 50 ft with 2 Turns(QC):  88


Walk 150 ft (QC):  88


Walking 10ft on uneven surface:  88


Does the Pt Use a Wheelchair:  Yes


Wheel 50 ft with 2 turns (QC):  5


Wheel 150 ft (QC):  5


Type of Wheelchair:  Manual


1 Step (curb) (QC):  88


4 Steps (QC):  88


12 Steps (QC):  88


Balance Sitting Static:  Good


Balance Sitting Dynamic:  Fair


Balance-Standing Static:  Poor


Picking up an Object (QC):  88





Occupational Therapy


Decreased Activ Tolerance, Dependent Transfers, Impaired Cognition, Impaired 

Self-Care Skills, Restricted Funct UE ROM


Eating (QC):  6


Oral Hygiene (QC):  6


Shower/Bathe Self (QC):  3


Upper Body Dressing (QC):  3


Lower Body Dressing (QC):  1


On/Off Footwear (QC):  2


Toileting Hygiene (QC):  1





PT Long Term Goals


Long Term Goals


PT Long Term Goals Time Frame:  May 8, 2023


Roll Left to Right (QC):  4


Sit to Lying (QC):  4


Lying-Sitting on Side/Bed(QC):  4


Sit to Stand (QC):  2


Chair/Bed-to-Chair Xfer(QC):  2


Toilet/Commode Transfer (QC):  2


Car Transfer (QC):  2


Does the Patient Walk:  No and Walking Goal NOT indicated


Walk 10 feet (QC):  1


Walk 10ft-Uneven Surface(QC):  88


Walk 50ft with 2 Turns (QC):  88


Walk 150 ft (QC):  88


Does the Pt use WC or Scooter?:  Yes


Wheel 50 feet with 2 turns (QC:  5 (Patient able to use (L) LE and (B) LE's for 

w/c propulsion)


Type:  Manual


Wheel 150 feet:  5


Type:  Manual


1 Step (curb) (QC):  88


4 Steps (QC):  88


12 Steps (QC):  88


Picking up an Object (QC):  88





OT Long Term Goals


Long Term Goals


Time Frame:  2023


Acute change in mental status:  0


Inattention:  0


Disorganized thinkin


Altered level of consciousness:  0


Eating (QC):  5 (met)


Oral Hygiene (QC):  5 (met)


Toileting Hygiene (QC):  4 (not met)


Shower/Bathe Self (QC):  4 (not met)


Upper Body Dressing (QC):  5 (not met)


Lower Body Dressing (QC):  4 (not met)


On/Off Footwear (QC):  4 (not met)


Additional Goals:  1-Demonstrate ADL Tasks, 2-Verbalize Understanding, 3-

ImproveStrength/Russ


1=Demonstrate adherence to instructed precautions during ADL tasks.


2=Patient will verbalize/demonstrate understanding of assistive 

devices/modifications for ADL.


3=Patient will improve strength/tolerance for activity to enable patient to 

perform ADL's.





Speech Long Term Goals


Long Term Goals


1. The patient will display improved cognitive linguistic skills for safe 

discharge to the least restrictive environment. MET





1. The patient will tolerate the least restrictive diet consistency without s/s 

of suspected aspiration. MET


Time Frame:  Three Weeks.











Laurel Heredia PT                Apr 3, 2023 15:53

## 2023-04-03 NOTE — D/C HH FACE TO FACE ORDER
D/C HH Face to Face Orders


Reconcile Patient Problems


Problems Reviewed?:  Yes





Instructions for Patient


HH


Patient Instructions/FollowUp:  


PCP 1 week


Physician to follow Patient:  PCP


Discharge Diet for Home:  ADA Diet


Patient Problems:  


CVA





Patient Data-Allergies,Ht & Wt


Patient Allergies:  


Coded Allergies:  


     alprazolam (Verified  Allergy, Unknown, 3/24/23)


     baclofen (Verified  Allergy, Unknown, 3/24/23)


     cyclobenzaprine (Verified  Allergy, Unknown, 3/24/23)





Home Health Need/Face to Face


Date of Face to Face:  Apr 3, 2023


Clinical Findings:  Muscle weakness, Unsteady gait


I have seen Pt face-to-face:  Yes


Discharged To:  Home


Diagnosis/Conditions:  


CVA


Patient is Homebound due to:  Muscle weakness


Homebound Status


   Due to the above stated illness, injury or surgical procedure (medical 

condition or diagnosis) and associated clinical findings, the patient is 

homebound because of his/her inability to leave home except with aid of a 

supportive device and/or person AND leaving the home requires a considerable and

taxing effort or is medically contraindicated.


Pt req the following assistanc:  Wheelchair





Home Health Nursing Orders


Home Health Services Order:  Nursing Services, Occupational Ther-Evaluate & 

Treat, Physical Therapy-Evaluate & Treat





Home Health Infusion Therapy


Line Start Date:  Mar 15, 2023


Certify Stmt


I certify that this patient is under my care and that I, a nurse practitioner or

a physician; a assistant working with me, had a face to face encounter that -

meets the physician face to face encounter requirements with this patient as 

dated.











COOKIE CHRISTIE DO                 Apr 3, 2023 06:12

## 2023-04-03 NOTE — THERAPY TEAM DISCHARGE SUMMARY
Therapy Discharge Summary


Discharge Recommendations


Date of Discharge








Physical Therapy


Roll Left to Right (QC):  6


Sit to Lying (QC):  3


Lying to Sitting/Side of Bed(Q:  3


Sit to Stand (QC):  1


Chair/Bed-to-Chair Xfer(QC):  1


Toilet Transfer (QC):  1


Car Transfer (QC):  88


Does the Patient Walk:  No and Walking Goal IS indicated


Mode of Locomotion:  Wheelchair


Anticipated Mode of Locomotion:  Wheelchair


Walk 10 feet (QC):  88


Walk 50 ft with 2 Turns(QC):  88


Walk 150 ft (QC):  88


Walking 10ft on uneven surface:  88


Does the Pt Use a Wheelchair:  Yes


Wheel 50 ft with 2 turns (QC):  5 (Patient propelled from // bars in gym into 

her room and around foot of bed to opposite side of room SBA only with prn cues 

(2-3) to use feet more for for avoiding obstacles)


Wheel 150 ft (QC):  3


Type of Wheelchair:  Manual


1 Step (curb) (QC):  88


4 Steps (QC):  88


12 Steps (QC):  88


Balance Sitting Static:  Good


Balance Sitting Dynamic:  Fair


Balance-Standing Static:  Poor


Picking up an Object (QC):  88





Occupational Therapy


Decreased Activ Tolerance, Dependent Transfers, Impaired Cognition, Impaired 

Self-Care Skills, Restricted Funct UE ROM


Eating (QC):  6


Oral Hygiene (QC):  6


Shower/Bathe Self (QC):  3


Upper Body Dressing (QC):  3


Lower Body Dressing (QC):  1


On/Off Footwear (QC):  2


Toileting Hygiene (QC):  1





Speech-Language Pathology


The patient displayed excellent progression towards speech, language, and 

dysphagia goals. The patient is tolerating a regular consistency diet with thin 

liquids at this time. The patient met goals initially placed by this clinician.





PT Long Term Goals


Long Term Goals


PT Long Term Goals Time Frame:  May 8, 2023


Roll Left to Right (QC):  4


Sit to Lying (QC):  4


Lying-Sitting on Side/Bed(QC):  4


Sit to Stand (QC):  2


Chair/Bed-to-Chair Xfer(QC):  2


Toilet/Commode Transfer (QC):  2


Car Transfer (QC):  2


Does the Patient Walk:  No and Walking Goal NOT indicated


Walk 10 feet (QC):  1


Walk 10ft-Uneven Surface(QC):  88


Walk 50ft with 2 Turns (QC):  88


Walk 150 ft (QC):  88


Does the Pt use WC or Scooter?:  Yes


Wheel 50 feet with 2 turns (QC:  5 (Patient able to use (L) LE and (B) LE's for 

w/c propulsion)


Type:  Manual


Wheel 150 feet:  5


Type:  Manual


1 Step (curb) (QC):  88


4 Steps (QC):  88


12 Steps (QC):  88


Picking up an Object (QC):  88





OT Long Term Goals


Long Term Goals


Time Frame:  2023


Acute change in mental status:  0


Inattention:  0


Disorganized thinkin


Altered level of consciousness:  0


Eating (QC):  5 (met)


Oral Hygiene (QC):  5 (met)


Toileting Hygiene (QC):  4 (not met)


Shower/Bathe Self (QC):  4 (not met)


Upper Body Dressing (QC):  5 (not met)


Lower Body Dressing (QC):  4 (not met)


On/Off Footwear (QC):  4 (not met)


Additional Goals:  1-Demonstrate ADL Tasks, 2-Verbalize Understanding, 3-

ImproveStrength/Russ


1=Demonstrate adherence to instructed precautions during ADL tasks.


2=Patient will verbalize/demonstrate understanding of assistive d

evices/modifications for ADL.


3=Patient will improve strength/tolerance for activity to enable patient to 

perform ADL's.





Speech Long Term Goals


Long Term Goals


1. The patient will display improved cognitive linguistic skills for safe 

discharge to the least restrictive environment. MET





1. The patient will tolerate the least restrictive diet consistency without s/s 

of suspected aspiration. MET


Time Frame:  Three Weeks.











LIUDMILA YADAV                Apr 3, 2023 08:45

## 2023-04-03 NOTE — THERAPY TEAM DISCHARGE SUMMARY
Therapy Discharge Summary


Discharge Recommendations


Date of Discharge








Physical Therapy


Roll Left to Right (QC):  3


Sit to Lying (QC):  3


Lying to Sitting/Side of Bed(Q:  3


Sit to Stand (QC):  1


Chair/Bed-to-Chair Xfer(QC):  1


Toilet Transfer (QC):  1


Car Transfer (QC):  1


Does the Patient Walk:  No and Walking Goal IS indicated


Mode of Locomotion:  Wheelchair


Anticipated Mode of Locomotion:  Wheelchair


Walk 10 feet (QC):  88


Walk 50 ft with 2 Turns(QC):  88


Walk 150 ft (QC):  88


Walking 10ft on uneven surface:  88


Does the Pt Use a Wheelchair:  Yes


Wheel 50 ft with 2 turns (QC):  4


Wheel 150 ft (QC):  4


Type of Wheelchair:  Manual


1 Step (curb) (QC):  88


4 Steps (QC):  88


12 Steps (QC):  88


Balance Sitting Static:  Good


Balance Sitting Dynamic:  Fair


Balance-Standing Static:  Poor


Picking up an Object (QC):  88





Occupational Therapy


Pt admitted to ARU s/p CVA. At [facility], pt was independent with ADLS at w/c level. 

Upon initial evaluation, pt required max A with oral care, UE dressing and 

footwear and total assist with showering, LE dressing and toileting. Eating not 

assessed upon initial evaluation due to pt being NPO with PEG tube placed. OT tx

focused on increasing BUE strength and activity tolerance, neuromuscular 

reeducation, and increasing safety and independence with ADLS and functional 

mobility. Pt made some functional progress towards goals attaining LTGs for 

eating and oral care. Pt discharging home with family support, recommendations 

include sit to stand lift, w/c, and hospital bed. Pt discharged from facility, 

d/c from OT.


Decreased Activ Tolerance, Dependent Transfers, Impaired Cognition, Impaired 

Self-Care Skills, Restricted Funct UE ROM


Eating (QC):  6


Oral Hygiene (QC):  6


Shower/Bathe Self (QC):  3


Upper Body Dressing (QC):  3


Lower Body Dressing (QC):  1


On/Off Footwear (QC):  2


Toileting Hygiene (QC):  1





PT Long Term Goals


Long Term Goals


PT Long Term Goals Time Frame:  May 8, 2023


Roll Left to Right (QC):  4


Sit to Lying (QC):  4


Lying-Sitting on Side/Bed(QC):  4


Sit to Stand (QC):  2


Chair/Bed-to-Chair Xfer(QC):  2


Toilet/Commode Transfer (QC):  2


Car Transfer (QC):  2


Does the Patient Walk:  No and Walking Goal NOT indicated


Walk 10 feet (QC):  1


Walk 10ft-Uneven Surface(QC):  88


Walk 50ft with 2 Turns (QC):  88


Walk 150 ft (QC):  88


Does the Pt use WC or Scooter?:  Yes


Wheel 50 feet with 2 turns (QC:  5 (Patient able to use (L) LE and (B) LE's for 

w/c propulsion)


Type:  Manual


Wheel 150 feet:  5


Type:  Manual


1 Step (curb) (QC):  88


4 Steps (QC):  88


12 Steps (QC):  88


Picking up an Object (QC):  88





OT Long Term Goals


Long Term Goals


Time Frame:  2023


Acute change in mental status:  0


Inattention:  0


Disorganized thinkin


Altered level of consciousness:  0


Eating (QC):  5 (met)


Oral Hygiene (QC):  5 (met)


Toileting Hygiene (QC):  4 (not met)


Shower/Bathe Self (QC):  4 (not met)


Upper Body Dressing (QC):  5 (not met)


Lower Body Dressing (QC):  4 (not met)


On/Off Footwear (QC):  4 (not met)


Additional Goals:  1-Demonstrate ADL Tasks, 2-Verbalize Understanding, 3-

ImproveStrength/Russ


1=Demonstrate adherence to instructed precautions during ADL tasks.


2=Patient will verbalize/demonstrate understanding of assistive de

vices/modifications for ADL.


3=Patient will improve strength/tolerance for activity to enable patient to 

perform ADL's.





Speech Long Term Goals


Long Term Goals


1. The patient will display improved cognitive linguistic skills for safe 

discharge to the least restrictive environment. MET





1. The patient will tolerate the least restrictive diet consistency without s/s 

of suspected aspiration. MET


Time Frame:  Three Weeks.











JEREMY SHAHID OT           Apr 3, 2023 14:05

## 2023-04-03 NOTE — OCCUPATIONAL THER DAILY NOTE
OT Current Status-Daily Note


Subjective


Pt sleeping in bed, woke to name.  Pt agrees to therapy.  No c/o pain.  Pt to 

discharge to home today.





Mental Status/Objective


Patient Orientation:  Person, Place, Time, Situation


Attachments:  Drains (wound vac), [place] Catheter, IV





ADL-Treatment


Pt independent with eating.  Using sit to stand lift to complete all transfers. 

Sitting at sink, pt able to complete oral care independently.  Pt agrees to 

shower.  Pt able to complete all areas sitting on rolling shower chair with 

cutout using grabbars, hand held shower and LH sponge except buttocks.  Pt is 

dependent for toilet hygiene due to incontinence of bowel/bladder.  Min A for 

UBD.  Dependent for LBD.  Pt has demonstrated ability to don L sock using one 

handed technique and figure 4 technique, assist with R sock.  After session, pt 

lying in bed with call light/phone in reach.  All needs met in room.


Therapy Code Descriptions/Definitions 





Functional [place] Measure:


0=Not Assessed/NA        4=Minimal Assistance


1=Total Assistance        5=Supervision or Setup


2=Maximal Assistance  6=Modified [place]


3=Moderate Assistance 7=Complete IndependenceSCALE: Activities may be completed 

with or without assistive devices.





6-Indepedent-patient completes the activity by him/herself with no assistance 

from a helper.


5-Set-up or Clean-up Assistance-helper sets up or cleans up; patient completes 

activity. [place] assists only prior to or  


    following the activity.


4-Supervision or Touching Assistance-helper provides verbal cues and/or 

touching/steadying and/or contact guard assistance as patient completes 

activity. Assistance may be provided   


    throughout the activity or intermittently.


3-Partial/Moderate Assistance-helper does LESS THAN HALF the effort. [place] 

lifts, holds or supports trunk or limbs, but provides less than half the effort.


2-Substantial/Maximal Assistance-helper does MORE THAN HALF the effort. [place] 

lifts or holds trunk or limbs and provides more than half the effort.


5-Hprgxgmiw-qmfmod does ALL the effort. Patient does none of the effort to 

complete the activity. Or, the assistance of 2 or more helpers is required for 

the patient to complete the  


    activity.


If activity was not attempted, code reason:


7-Patient Refused.


9-Not Applicable-not attempted and the patient did not perform the activity 

before the current illness, exacerbation or injury.


10-Not Attempted due to Environmental Limitations-(lack of equipment, weather 

restraints, etc.).


88-Not Attempted due to Medical Conditions or Safety Concerns.


Eating (QC):  6


Oral Hygiene (QC):  6


Shower/Bathe Self (QC):  3


Upper Body Dressing (QC):  3


Lower Body Dressing (QC):  1


On/Off Footwear:  2


Toileting Hygiene (QC):  1


Toilet Transfer (QC):  1


Family education completed for sit to stand lift, ADLs and positioning pt for 

rolling, sitting, supine and standing in lift.  Pt's  demonstrates good 

technique with all.





BIMS CAM


BIMS


Expression of Ideas and Wants:  Difficulty


Understanding Verbal Content:  Understands


Brief Interview/Mental Status:  Yes


IRF BARBARA BIMS:  








IRF BARBARA BIMS Response (Comments) Value


 


Repitition of Three Words Three 3


 


Recalls Socks Yes, No Cue Required 2


 


Recalls Blue Yes, After Cueing (Color) (pt is aphasic) 1


 


Recalls Bed Yes, After Cueing (pt is aphasic) 1


 


Year Correct 3


 


Month Accurate Within 5 Days 2


 


Day Correct 1


 


Total  13








Patient Normally Able to Recal:  Current Session, Location of own room, Staff 

Names and faces, That he/she in a hsp


Should Staff Asses. Mental St.:  No





CAM


Mental Status Change/Baseline:  0


Inattention:  0


Disorganized thinkin


Altered level of consciousness:  0





OT Short Term Goals


Short Term Goals


Time Frame:  Mar 28, 2023


Eating:  3


Toileting hygiene:  3


Shower/bathe self:  3


Lower body dressing:  3


Putting on/taking off footwear:  3





OT Long Term Goals


Long Term Goals


Time Frame:  2023


Acute change in mental status:  1


Inattention:  0


Disorganized thinkin


Altered level of consciousness:  0


Eating (QC):  5 (met)


Oral Hygiene (QC):  5 (met)


Toileting Hygiene (QC):  4 (not met)


Shower/Bathe Self (QC):  4 (not met)


Upper Body Dressing (QC):  5 (not met)


Lower Body Dressing (QC):  4 (not met)


On/Off Footwear (QC):  4 (not met)


Additional Goals:  1-Demonstrate ADL Tasks, 2-Verbalize Understanding, 3-

ImproveStrength/Russ


1=Demonstrate adherence to instructed precautions during ADL tasks.


2=Patient will verbalize/demonstrate understanding of assistive 

devices/modifications for ADL.


3=Patient will improve strength/tolerance for activity to enable patient to 

perform ADL's.





OT Education/Plan


Problem List/Assessment


Assessment:  Decreased Activ Tolerance, Dependent Transfers, Impaired Cognition,

Impaired Self-Care Skills, Restricted Funct UE ROM





Discharge Recommendations


Plan/Recommendations:  Discharge/Goals Met


Therapy Discharge Recommendati:  Home & Family, Post Acute PT, Post Acute ST, 

Post Acute OT


Comment


will have sit to stand lift at home, w/c





Treatment Plan/Plan of Care


Patient would benefit from OT for education, treatment and training to promote 

independence in ADL's, mobility, safety and/or upper extremity function for 

ADL's.


Plan of Care:  ADL Retraining, Functional Mobility, Group Exercise/Act as Ind, U

E Funct Exercise/Act, UE Neuromus Re-Ed/Coord, Visual/Perceptual Retrain, W/C 

Management Training


Treatment Duration:  2023


Frequency:  At least 5 of 7 days/Wk (IRF)


Estimated Hrs Per Day:  1.5 hours per day


Agreement:  Yes


Rehab Potential:  Guarded





Time


Start Time:  06:45


Stop Time:  08:00


DATE:  Apr 3, 2023


Total Time Billed (hr/min):  75


Billed Treatment Time


1 visit-ADL 5 (75 min)











ARTIE MC                Apr 3, 2023 07:04

## 2023-04-03 NOTE — PHYSICAL THERAPY DAILY NOTE
PT Daily Note-Current


Subjective


Pt found lying in bed with nurse and family present. Agreed to PT. States that 

she was a little skeptical about going home today but she is ready for it. 

Reports that she is feeling okay and does not have any pain.





Pain


Section J - Health Conditions


1. Rarely or not at all


2. Occasionally


3. Frequently


4. Almost constantly


8. Unable to answer


Pain Effect on Sleep:  1


Pain Interference with Therapy:  2


Pain Interference w/Day-to-Day:  2





Mental Status


Patient Orientation:  Person, Place


Attachments:  [place] Catheter





Transfers


SCALE: Activities may be completed with or without assistive devices.





6-Indepedent-patient completes the activity by him/herself with no assistance 

from a helper.


5-Set-up or Clean-up Assistance-helper sets up or cleans up; patient completes 

activity. [place] assists only prior to or  


    following the activity.


4-Supervision or Touching Assistance-helper provides verbal cues and/or 

touching/steadying and/or contact guard assistance as patient completes 

activity. Assistance may be provided   


    throughout the activity or intermittently.


3-Partial/Moderate Assistance-helper does LESS THAN HALF the effort. [place] 

lifts, holds or supports trunk or limbs, but provides less than half the effort.


2-Substantial/Maximal Assistance-helper does MORE THAN HALF the effort. [place] 

lifts or holds trunk or limbs and provides more than half the effort.


6-Tphmwxwnp-ufkyri does ALL the effort. Patient does none of the effort to 

complete the activity. Or, the assistance of 2 or more helpers is required for 

the patient to complete the  


    activity.


If activity was not attempted, code reason:


7-Patient Refused.


9-Not Applicable-not attempted and the patient did not perform the activity 

before the current illness, exacerbation or injury.


10-Not Attempted due to Environmental Limitations-(lack of equipment, weather 

restraints, etc.).


88-Not Attempted due to Medical Conditions or Safety Concerns.


Roll Left & Right (QC):  3


Sit to Lying (QC):  3


Lying to Sitting/Side of Bed(Q:  3


Sit to Stand (QC):  1


Chair/Bed-to-Chair Xfer(QC):  1


Toilet Transfer (QC):  1


Car Transfer (QC):  1


Pt MIN assist with all bed mobility. Dependent with all other transfers using 

sit to stand machine.





Weight Bearing


Weight Bearing/Tolerated


Weight Bearing/Tolerated





Gait Training


Does the Patient Walk?:  No and Walking Goal IS indicated


Walk 10 feet (QC):  88


Walk 50 ft with 2 Turns(QC):  88


Walk 150 ft (QC):  88


Walking 10ft/uneven surface-QC:  88


Pt unable to complete gait training at this time due to safety concerns.





Wheelchair Training


Does the Pt Use a Wheelchair?:  Yes


Wheel 50 ft with 2 turns (QC):  4


Wheel 150 ft (QC):  4


Type of Wheelchair:  Manual


Pt SBA with wheelchair mobility. Slowly able to move wheelchair using unaffected

UE and steering completed with LEs. Wheeled 150 feet total.





Stair Training


1 Step (curb) (QC):  88


4 Steps (QC):  88


12 Steps (QC):  88


Pt unable to complete stair training at this time due to safety concerns.





Balance


Picking up an Object (QC):  88


Special Test Comments


Pt unable to  object from floor at this time due to safety concerns.





Assessment


Current Status:  Fair Progress


Pt displays good tolerance to bed mobility and wheelchair training. No 

demonstration of increased fatigue or pain throughout visit. Continue to 

progress pt as tolerated per POC to improve strength, endurance, and functional 

ability.





PT Long Term Goals


Long Term Goals


PT Long Term Goals Time Frame:  May 8, 2023


Roll Left & Right (QC):  4


Sit to Lying (QC):  4


Lying-Sitting on Side/Bed(QC):  4


Sit to Stand (QC):  2


Chair/Bed-to-Chair Xfer(QC):  2


Toilet Transfer (QC):  2


Car Transfer (QC):  2


Does the Patient Walk:  No and Walking Goal NOT indicated


Walk 10 feet (QC):  1


Walk 50ft with 2 Turns (QC):  88


Walk 150 ft (QC):  88


Walking 10ft on Uneven Surface:  88


1 Step (curb) (QC):  88


4 Steps (QC):  88


12 Steps (QC):  88


Picking up an Object (QC):  88


Does the Pt use WC or Scooter?:  Yes


Wheel 50 feet with 2 turns (QC:  5 (Patient able to use (L) LE and (B) LE's for 

w/c propulsion)


Type:  Manual


Wheel 150 feet:  5


Type:  Manual





PT Plan


Treatment/Plan


Treatment Plan:  Continue Plan of Care


Treatment Plan:  Bed Mobility, Concurrent Therapy, Education, Functional 

Activity Russ, Functional Strength, Group Therapy, Safety, Therapeutic Exe

rcise, Transfers, Other (W/C mobility)


Treatment Duration:  May 8, 2023


Frequency:  At least 5 of 7 days/Wk (IRF)


Estimated Hrs Per Day:  1.5 hours per day


Patient and/or Family Agrees t:  Yes





Time


Time In:  1015


Time Out:  1030


DATE:  Apr 3, 2023


Total Billed Treatment Time:  15


Total Billed Treatment


1 visit


FA x 1











PADDY SINGH PTA                 Apr 3, 2023 10:59